# Patient Record
Sex: MALE | Race: WHITE | Employment: UNEMPLOYED | URBAN - METROPOLITAN AREA
[De-identification: names, ages, dates, MRNs, and addresses within clinical notes are randomized per-mention and may not be internally consistent; named-entity substitution may affect disease eponyms.]

---

## 2024-04-26 ENCOUNTER — APPOINTMENT (OUTPATIENT)
Dept: CT IMAGING | Age: 74
DRG: 175 | End: 2024-04-26
Attending: EMERGENCY MEDICINE
Payer: MEDICARE

## 2024-04-26 ENCOUNTER — HOSPITAL ENCOUNTER (INPATIENT)
Age: 74
LOS: 10 days | Discharge: NURSING, REHAB OR LONG-TERM CARE | DRG: 175 | End: 2024-05-06
Attending: EMERGENCY MEDICINE | Admitting: INTERNAL MEDICINE
Payer: MEDICARE

## 2024-04-26 ENCOUNTER — APPOINTMENT (OUTPATIENT)
Dept: GENERAL RADIOLOGY | Age: 74
DRG: 175 | End: 2024-04-26
Payer: MEDICARE

## 2024-04-26 ENCOUNTER — APPOINTMENT (OUTPATIENT)
Age: 74
DRG: 175 | End: 2024-04-26
Attending: EMERGENCY MEDICINE
Payer: MEDICARE

## 2024-04-26 DIAGNOSIS — I26.99 ACUTE PULMONARY EMBOLISM, UNSPECIFIED PULMONARY EMBOLISM TYPE, UNSPECIFIED WHETHER ACUTE COR PULMONALE PRESENT (HCC): Primary | ICD-10-CM

## 2024-04-26 LAB
ALBUMIN SERPL-MCNC: 2.8 G/DL (ref 3.5–5.2)
ALP SERPL-CCNC: 100 U/L (ref 40–129)
ALT SERPL-CCNC: 16 U/L (ref 0–40)
ANION GAP SERPL CALCULATED.3IONS-SCNC: 12 MMOL/L (ref 7–16)
AST SERPL-CCNC: 35 U/L (ref 0–39)
B.E.: 5.9 MMOL/L (ref -3–3)
BASOPHILS # BLD: 0.06 K/UL (ref 0–0.2)
BASOPHILS NFR BLD: 1 % (ref 0–2)
BILIRUB SERPL-MCNC: 0.7 MG/DL (ref 0–1.2)
BILIRUB UR QL STRIP: ABNORMAL
BNP SERPL-MCNC: 1044 PG/ML (ref 0–450)
BUN SERPL-MCNC: 8 MG/DL (ref 6–23)
CALCIUM SERPL-MCNC: 9.7 MG/DL (ref 8.6–10.2)
CHLORIDE SERPL-SCNC: 101 MMOL/L (ref 98–107)
CLARITY UR: CLEAR
CO2 SERPL-SCNC: 28 MMOL/L (ref 22–29)
COHB: 0.7 % (ref 0–1.5)
COLOR UR: YELLOW
COMMENT: ABNORMAL
CREAT SERPL-MCNC: 0.6 MG/DL (ref 0.7–1.2)
CRITICAL: ABNORMAL
DATE ANALYZED: ABNORMAL
DATE OF COLLECTION: ABNORMAL
ECHO BSA: 2.32 M2
ECHO LA VOL A-L A2C: 46 ML (ref 18–58)
ECHO LA VOL A-L A4C: 54 ML (ref 18–58)
ECHO LA VOL BP: 47 ML (ref 18–58)
ECHO LA VOL MOD A2C: 43 ML (ref 18–58)
ECHO LA VOL MOD A4C: 48 ML (ref 18–58)
ECHO LA VOL/BSA BIPLANE: 21 ML/M2 (ref 16–34)
ECHO LA VOLUME AREA LENGTH: 51 ML
ECHO LA VOLUME INDEX A-L A2C: 20 ML/M2 (ref 16–34)
ECHO LA VOLUME INDEX A-L A4C: 24 ML/M2 (ref 16–34)
ECHO LA VOLUME INDEX AREA LENGTH: 23 ML/M2 (ref 16–34)
ECHO LA VOLUME INDEX MOD A2C: 19 ML/M2 (ref 16–34)
ECHO LA VOLUME INDEX MOD A4C: 21 ML/M2 (ref 16–34)
ECHO LV EF PHYSICIAN: 60 %
ECHO LV INTERNAL DIMENSION DIASTOLE INDEX: 1.64 CM/M2
ECHO LV INTERNAL DIMENSION DIASTOLIC: 3.7 CM (ref 4.2–5.9)
ECHO LV IVSD: 1.6 CM (ref 0.6–1)
ECHO LV MASS 2D: 208.7 G (ref 88–224)
ECHO LV MASS INDEX 2D: 92.3 G/M2 (ref 49–115)
ECHO LV POSTERIOR WALL DIASTOLIC: 1.4 CM (ref 0.6–1)
ECHO LV RELATIVE WALL THICKNESS RATIO: 0.76
ECHO MV "A" WAVE DURATION: 133.2 MSEC
ECHO MV A VELOCITY: 0.94 M/S
ECHO MV E DECELERATION TIME (DT): 304.9 MS
ECHO MV E VELOCITY: 0.44 M/S
ECHO MV E/A RATIO: 0.47
ECHO RV INTERNAL DIMENSION: 4.4 CM
ECHO RV TAPSE: 1.9 CM (ref 1.7–?)
ECHO TV REGURGITANT MAX VELOCITY: 2.58 M/S
ECHO TV REGURGITANT PEAK GRADIENT: 24 MMHG
EKG ATRIAL RATE: 81 BPM
EKG P AXIS: 57 DEGREES
EKG P-R INTERVAL: 176 MS
EKG Q-T INTERVAL: 390 MS
EKG QRS DURATION: 148 MS
EKG QTC CALCULATION (BAZETT): 453 MS
EKG R AXIS: 10 DEGREES
EKG T AXIS: 29 DEGREES
EKG VENTRICULAR RATE: 81 BPM
EOSINOPHIL # BLD: 0.09 K/UL (ref 0.05–0.5)
EOSINOPHILS RELATIVE PERCENT: 1 % (ref 0–6)
ERYTHROCYTE [DISTWIDTH] IN BLOOD BY AUTOMATED COUNT: 14.8 % (ref 11.5–15)
GFR SERPL CREATININE-BSD FRML MDRD: >90 ML/MIN/1.73M2
GLUCOSE SERPL-MCNC: 91 MG/DL (ref 74–99)
GLUCOSE UR STRIP-MCNC: NEGATIVE MG/DL
HCO3: 29.3 MMOL/L (ref 22–26)
HCT VFR BLD AUTO: 43.2 % (ref 37–54)
HGB BLD-MCNC: 13.6 G/DL (ref 12.5–16.5)
HGB UR QL STRIP.AUTO: NEGATIVE
HHB: 3.1 % (ref 0–5)
IMM GRANULOCYTES # BLD AUTO: 0.04 K/UL (ref 0–0.58)
IMM GRANULOCYTES NFR BLD: 1 % (ref 0–5)
KETONES UR STRIP-MCNC: >80 MG/DL
LAB: ABNORMAL
LACTATE BLDV-SCNC: 1.3 MMOL/L (ref 0.5–1.9)
LEUKOCYTE ESTERASE UR QL STRIP: NEGATIVE
LYMPHOCYTES NFR BLD: 1.26 K/UL (ref 1.5–4)
LYMPHOCYTES RELATIVE PERCENT: 15 % (ref 20–42)
Lab: 1055
MCH RBC QN AUTO: 27.6 PG (ref 26–35)
MCHC RBC AUTO-ENTMCNC: 31.5 G/DL (ref 32–34.5)
MCV RBC AUTO: 87.6 FL (ref 80–99.9)
METHB: 0.3 % (ref 0–1.5)
MODE: ABNORMAL
MONOCYTES NFR BLD: 1.12 K/UL (ref 0.1–0.95)
MONOCYTES NFR BLD: 14 % (ref 2–12)
NEUTROPHILS NFR BLD: 69 % (ref 43–80)
NEUTS SEG NFR BLD: 5.59 K/UL (ref 1.8–7.3)
NITRITE UR QL STRIP: NEGATIVE
O2 SATURATION: 96.9 % (ref 92–98.5)
O2HB: 95.9 % (ref 94–97)
OPERATOR ID: ABNORMAL
PARTIAL THROMBOPLASTIN TIME: >240 SEC (ref 24.5–35.1)
PATIENT TEMP: 37 C
PCO2: 38.2 MMHG (ref 35–45)
PH BLOOD GAS: 7.5 (ref 7.35–7.45)
PH UR STRIP: 7 [PH] (ref 5–9)
PLATELET # BLD AUTO: 330 K/UL (ref 130–450)
PMV BLD AUTO: 10.3 FL (ref 7–12)
PO2: 91.6 MMHG (ref 75–100)
POTASSIUM SERPL-SCNC: 4.3 MMOL/L (ref 3.5–5)
PROT SERPL-MCNC: 5.8 G/DL (ref 6.4–8.3)
PROT UR STRIP-MCNC: NEGATIVE MG/DL
RBC # BLD AUTO: 4.93 M/UL (ref 3.8–5.8)
SODIUM SERPL-SCNC: 141 MMOL/L (ref 132–146)
SOURCE, BLOOD GAS: ABNORMAL
SP GR UR STRIP: 1.01 (ref 1–1.03)
THB: 14 G/DL (ref 11.5–16.5)
TIME ANALYZED: 1102
TROPONIN I SERPL HS-MCNC: 76 NG/L (ref 0–11)
TROPONIN I SERPL HS-MCNC: 76 NG/L (ref 0–11)
UROBILINOGEN UR STRIP-ACNC: 2 EU/DL (ref 0–1)
WBC OTHER # BLD: 8.2 K/UL (ref 4.5–11.5)

## 2024-04-26 PROCEDURE — 84182 PROTEIN WESTERN BLOT TEST: CPT

## 2024-04-26 PROCEDURE — 93308 TTE F-UP OR LMTD: CPT | Performed by: INTERNAL MEDICINE

## 2024-04-26 PROCEDURE — 6360000004 HC RX CONTRAST MEDICATION: Performed by: RADIOLOGY

## 2024-04-26 PROCEDURE — 2580000003 HC RX 258: Performed by: EMERGENCY MEDICINE

## 2024-04-26 PROCEDURE — 2000000000 HC ICU R&B

## 2024-04-26 PROCEDURE — 84484 ASSAY OF TROPONIN QUANT: CPT

## 2024-04-26 PROCEDURE — 83880 ASSAY OF NATRIURETIC PEPTIDE: CPT

## 2024-04-26 PROCEDURE — 83605 ASSAY OF LACTIC ACID: CPT

## 2024-04-26 PROCEDURE — 6360000002 HC RX W HCPCS: Performed by: EMERGENCY MEDICINE

## 2024-04-26 PROCEDURE — 71275 CT ANGIOGRAPHY CHEST: CPT

## 2024-04-26 PROCEDURE — 74177 CT ABD & PELVIS W/CONTRAST: CPT

## 2024-04-26 PROCEDURE — 87040 BLOOD CULTURE FOR BACTERIA: CPT

## 2024-04-26 PROCEDURE — 71045 X-RAY EXAM CHEST 1 VIEW: CPT

## 2024-04-26 PROCEDURE — 99285 EMERGENCY DEPT VISIT HI MDM: CPT

## 2024-04-26 PROCEDURE — 85025 COMPLETE CBC W/AUTO DIFF WBC: CPT

## 2024-04-26 PROCEDURE — 93005 ELECTROCARDIOGRAM TRACING: CPT | Performed by: EMERGENCY MEDICINE

## 2024-04-26 PROCEDURE — 96374 THER/PROPH/DIAG INJ IV PUSH: CPT

## 2024-04-26 PROCEDURE — 2580000003 HC RX 258

## 2024-04-26 PROCEDURE — 82805 BLOOD GASES W/O2 SATURATION: CPT

## 2024-04-26 PROCEDURE — 93010 ELECTROCARDIOGRAM REPORT: CPT | Performed by: INTERNAL MEDICINE

## 2024-04-26 PROCEDURE — 6370000000 HC RX 637 (ALT 250 FOR IP)

## 2024-04-26 PROCEDURE — 80053 COMPREHEN METABOLIC PANEL: CPT

## 2024-04-26 PROCEDURE — 93308 TTE F-UP OR LMTD: CPT

## 2024-04-26 PROCEDURE — 99291 CRITICAL CARE FIRST HOUR: CPT | Performed by: INTERNAL MEDICINE

## 2024-04-26 PROCEDURE — 81003 URINALYSIS AUTO W/O SCOPE: CPT

## 2024-04-26 PROCEDURE — 86051 AQUAPORIN-4 ANTB ELISA: CPT

## 2024-04-26 PROCEDURE — 70450 CT HEAD/BRAIN W/O DYE: CPT

## 2024-04-26 PROCEDURE — 85730 THROMBOPLASTIN TIME PARTIAL: CPT

## 2024-04-26 RX ORDER — INSULIN GLARGINE 100 [IU]/ML
30 INJECTION, SOLUTION SUBCUTANEOUS EVERY MORNING
Status: ON HOLD | COMMUNITY
End: 2024-05-06 | Stop reason: HOSPADM

## 2024-04-26 RX ORDER — POLYETHYLENE GLYCOL 3350 17 G/17G
17 POWDER, FOR SOLUTION ORAL DAILY PRN
Status: DISCONTINUED | OUTPATIENT
Start: 2024-04-26 | End: 2024-05-06 | Stop reason: HOSPADM

## 2024-04-26 RX ORDER — TAMSULOSIN HYDROCHLORIDE 0.4 MG/1
0.4 CAPSULE ORAL DAILY
Status: DISCONTINUED | OUTPATIENT
Start: 2024-04-26 | End: 2024-05-06 | Stop reason: HOSPADM

## 2024-04-26 RX ORDER — 0.9 % SODIUM CHLORIDE 0.9 %
500 INTRAVENOUS SOLUTION INTRAVENOUS ONCE
Status: COMPLETED | OUTPATIENT
Start: 2024-04-26 | End: 2024-04-26

## 2024-04-26 RX ORDER — SODIUM CHLORIDE 0.9 % (FLUSH) 0.9 %
5-40 SYRINGE (ML) INJECTION EVERY 12 HOURS SCHEDULED
Status: DISCONTINUED | OUTPATIENT
Start: 2024-04-26 | End: 2024-05-06 | Stop reason: HOSPADM

## 2024-04-26 RX ORDER — ACETAMINOPHEN 325 MG/1
650 TABLET ORAL EVERY 6 HOURS PRN
Status: DISCONTINUED | OUTPATIENT
Start: 2024-04-26 | End: 2024-05-06 | Stop reason: HOSPADM

## 2024-04-26 RX ORDER — HEPARIN SODIUM 10000 [USP'U]/100ML
5-30 INJECTION, SOLUTION INTRAVENOUS CONTINUOUS
Status: DISCONTINUED | OUTPATIENT
Start: 2024-04-26 | End: 2024-04-30

## 2024-04-26 RX ORDER — ACETAMINOPHEN 650 MG/1
650 SUPPOSITORY RECTAL EVERY 6 HOURS PRN
Status: DISCONTINUED | OUTPATIENT
Start: 2024-04-26 | End: 2024-05-06 | Stop reason: HOSPADM

## 2024-04-26 RX ORDER — TAMSULOSIN HYDROCHLORIDE 0.4 MG/1
0.4 CAPSULE ORAL DAILY
Status: ON HOLD | COMMUNITY
End: 2024-05-06 | Stop reason: HOSPADM

## 2024-04-26 RX ORDER — QUETIAPINE FUMARATE 25 MG/1
50 TABLET, FILM COATED ORAL NIGHTLY
Status: ON HOLD | COMMUNITY
End: 2024-05-06 | Stop reason: HOSPADM

## 2024-04-26 RX ORDER — ONDANSETRON 4 MG/1
4 TABLET, FILM COATED ORAL EVERY 8 HOURS PRN
COMMUNITY

## 2024-04-26 RX ORDER — SODIUM CHLORIDE 0.9 % (FLUSH) 0.9 %
5-40 SYRINGE (ML) INJECTION PRN
Status: DISCONTINUED | OUTPATIENT
Start: 2024-04-26 | End: 2024-05-06 | Stop reason: HOSPADM

## 2024-04-26 RX ORDER — ONDANSETRON 2 MG/ML
4 INJECTION INTRAMUSCULAR; INTRAVENOUS EVERY 6 HOURS PRN
Status: DISCONTINUED | OUTPATIENT
Start: 2024-04-26 | End: 2024-05-06 | Stop reason: HOSPADM

## 2024-04-26 RX ORDER — HEPARIN SODIUM 1000 [USP'U]/ML
80 INJECTION, SOLUTION INTRAVENOUS; SUBCUTANEOUS PRN
Status: DISCONTINUED | OUTPATIENT
Start: 2024-04-26 | End: 2024-04-30

## 2024-04-26 RX ORDER — FENTANYL 25 UG/1
1 PATCH TRANSDERMAL
Status: DISCONTINUED | OUTPATIENT
Start: 2024-04-26 | End: 2024-04-30

## 2024-04-26 RX ORDER — 0.9 % SODIUM CHLORIDE 0.9 %
1000 INTRAVENOUS SOLUTION INTRAVENOUS ONCE
Status: COMPLETED | OUTPATIENT
Start: 2024-04-26 | End: 2024-04-26

## 2024-04-26 RX ORDER — SODIUM CHLORIDE 9 MG/ML
INJECTION, SOLUTION INTRAVENOUS PRN
Status: DISCONTINUED | OUTPATIENT
Start: 2024-04-26 | End: 2024-05-06 | Stop reason: HOSPADM

## 2024-04-26 RX ORDER — QUETIAPINE FUMARATE 25 MG/1
50 TABLET, FILM COATED ORAL NIGHTLY
Status: DISCONTINUED | OUTPATIENT
Start: 2024-04-26 | End: 2024-05-03

## 2024-04-26 RX ORDER — ONDANSETRON 4 MG/1
4 TABLET, ORALLY DISINTEGRATING ORAL EVERY 8 HOURS PRN
Status: DISCONTINUED | OUTPATIENT
Start: 2024-04-26 | End: 2024-05-06 | Stop reason: HOSPADM

## 2024-04-26 RX ORDER — HEPARIN SODIUM 1000 [USP'U]/ML
80 INJECTION, SOLUTION INTRAVENOUS; SUBCUTANEOUS ONCE
Status: COMPLETED | OUTPATIENT
Start: 2024-04-26 | End: 2024-04-26

## 2024-04-26 RX ORDER — HEPARIN SODIUM 1000 [USP'U]/ML
40 INJECTION, SOLUTION INTRAVENOUS; SUBCUTANEOUS PRN
Status: DISCONTINUED | OUTPATIENT
Start: 2024-04-26 | End: 2024-04-30

## 2024-04-26 RX ADMIN — SODIUM CHLORIDE, PRESERVATIVE FREE 10 ML: 5 INJECTION INTRAVENOUS at 22:14

## 2024-04-26 RX ADMIN — HEPARIN SODIUM 18 UNITS/KG/HR: 10000 INJECTION, SOLUTION INTRAVENOUS at 15:47

## 2024-04-26 RX ADMIN — HEPARIN SODIUM 8700 UNITS: 1000 INJECTION INTRAVENOUS; SUBCUTANEOUS at 15:44

## 2024-04-26 RX ADMIN — QUETIAPINE FUMARATE 50 MG: 25 TABLET ORAL at 22:14

## 2024-04-26 RX ADMIN — SODIUM CHLORIDE 1000 ML: 9 INJECTION, SOLUTION INTRAVENOUS at 11:00

## 2024-04-26 RX ADMIN — SODIUM CHLORIDE 500 ML: 9 INJECTION, SOLUTION INTRAVENOUS at 15:41

## 2024-04-26 RX ADMIN — CEFEPIME 2000 MG: 2 INJECTION, POWDER, FOR SOLUTION INTRAVENOUS at 17:35

## 2024-04-26 RX ADMIN — IOPAMIDOL 75 ML: 755 INJECTION, SOLUTION INTRAVENOUS at 15:17

## 2024-04-26 ASSESSMENT — PAIN SCALES - GENERAL: PAINLEVEL_OUTOF10: 0

## 2024-04-26 ASSESSMENT — LIFESTYLE VARIABLES: HOW MANY STANDARD DRINKS CONTAINING ALCOHOL DO YOU HAVE ON A TYPICAL DAY: PATIENT DOES NOT DRINK

## 2024-04-26 ASSESSMENT — PAIN - FUNCTIONAL ASSESSMENT: PAIN_FUNCTIONAL_ASSESSMENT: NONE - DENIES PAIN

## 2024-04-26 NOTE — H&P
[facility]  Internal Medicine Residency Program  History and Physical    Patient:  Luis Seo 74 y.o. male   MRN: [id number]       Date of Service: 4/26/2024    Chief complaint: had concerns including Fatigue (Bilateral leg weakness, fatigue, Pancreatic CA had lrg portion removed 3 weeks ago. Denies pain, 87% on RA denies SOB).  History of Present Illness   The patient is a 74 y.o. male presenting with complaint of worsening BLE weakness and fatigue. Recent hx of pancreatic Ca, RCC s/p resection with known persisting positive margins and microwave ablation. Is largely immobile at home where his daughter is his caretaker. Had been on hospice care after additional workup at [facility] unremarkable investigating his recent neurological sxs.     ED Course: CTA w b/l PE, R>L with significant clot burden. Pt started on heparin ggt. STAT ECHO without R heart strain. Pt was given x1 dose of cefepime. Hypertensive systolic max 159, no tachycardia, saturating well on RA initially now wearing NC for comfort 1L 95%.     Review of Systems   Constitutional:  Positive for fatigue. Negative for chills and fever.   HENT:  Positive for trouble swallowing. Negative for congestion.    Eyes:  Negative for visual disturbance.   Respiratory:  Negative for cough, chest tightness and wheezing.    Cardiovascular:  Positive for leg swelling (L>R). Negative for chest pain and palpitations.   Gastrointestinal:  Positive for diarrhea (Intermitent.). Negative for abdominal pain, blood in stool, constipation, nausea and vomiting.   Genitourinary:  Negative for difficulty urinating, dysuria and hematuria.   Neurological:  Positive for weakness (BLE). Negative for dizziness, syncope and headaches.     Medications Prior to Admission:    Prior to Admission medications    Medication Sig Start Date End Date Taking? Authorizing Provider   tamsulosin (FLOMAX) 0.4 MG capsule Take 1 capsule by mouth daily   Yes Provider, MD Daryl

## 2024-04-26 NOTE — ED NOTES
Report given to Eusebia RUBALCAVA from Elvia RN   Report method by phone   The following was reviewed with receiving RN:   Current vital signs:  BP (!) 141/79   Pulse 74   Temp 98.1 °F (36.7 °C) (Oral)   Resp 24   Ht 1.778 m (5' 10\")   Wt 108.9 kg (240 lb)   SpO2 96%   BMI 34.44 kg/m²                MEWS Score: 1     Any medication or safety alerts were reviewed. Any pending diagnostics and notifications were also reviewed, as well as any safety concerns or issues, abnormal labs, abnormal imaging, and abnormal assessment findings. Questions were answered.

## 2024-04-26 NOTE — ED NOTES
The following labs labeled with pt sticker and tubed to lab:     [x] Blue     [] Lavender   [] on ice  [x] Green/yellow  [] Green/black [] on ice  [] Yellow  [] Red  [] Pink      [] COVID-19 swab    [] Rapid  [] PCR  [] Peds Viral Panel     [] Urine Sample  [] Pelvic Cultures  [] Blood Cultures

## 2024-04-26 NOTE — ED PROVIDER NOTES
[facility] EMERGENCY DEPARTMENT  EMERGENCY DEPARTMENT ENCOUNTER        Pt Name: Luis Seo  MRN: [id number]  Birthdate 1950  Date of evaluation: 4/26/2024  Provider: Jorge Curiel MD  PCP: Brad Bassett MD  Note Started: 10:08 AM EDT 4/26/24    CHIEF COMPLAINT       Chief Complaint   Patient presents with    Fatigue     Bilateral leg weakness, fatigue, Pancreatic CA had lrg portion removed 3 weeks ago. Denies pain, 87% on RA denies SOB       HISTORY OF PRESENT ILLNESS: 1 or more Elements        Limitations to history : None    Luis Seo is a 74 y.o. male who presents for generalized weakness and fatigue.  Patient reports a complex history including a recent [place] as well as full complex workup at the [facility] and eventually being discharged home with generalized weakness in his legs.  He says he feels like he is getting more weak and fatigued over the last several weeks.  He says he has no energy.  No fever or chills.  No chest pain or shortness of breath.  He was hypoxic on EMS arrival.  He reports 2 bowel movements recently.  He denies any blood in the stool.  He denies any bleeding.  He denies any paralysis or unilateral weakness.  Denies any speech changes.  Denies facial droop.    Nursing Notes were all reviewed and agreed with or any disagreements were addressed in the HPI.      REVIEW OF EXTERNAL NOTE :       Oncology notes from yesterday  4-10-24 neurology notes      Chart Review/External Note Review    Last Echo reviewed by Me:  No results found for: \"LVEF\", \"LVEFMODE\"          Controlled Substance Monitoring:    Acute and Chronic Pain Monitoring:        No data to display                    REVIEW OF SYSTEMS :      Positives and Pertinent negatives as per HPI.     SURGICAL HISTORY   No past surgical history on file.    CURRENTMEDICATIONS       Previous Medications    FENTANYL (DURAGESIC) 25 MCG/HR    Place 1 patch onto the skin    CARBON DIOXIDE 28   Anion Gap 12   Glucose, Random 91   BUN,BUNPL 8   Creatinine 0.6(!)   Est, Glom Filt Rate >90   Calcium, Total 9.7   Total Protein, Serum 5.8(!)   Albumin 2.8(!)   Total Bilirubin 0.7   Alk Phos 100   ALT 16   AST 35 [CD]   1614 Blood Gas, Arterial(!):    Date Analyzed 20240426   Time Analyzed 1102   Source: Blood Arterial   pH, Blood Gas 7.502(!)   PCO2 38.2   pO2 91.6   HCO3 29.3(!)   Base Excess 5.9(!)   O2 Sat 96.9   O2Hb 95.9   Carboxy-Hgb 0.7   METHB,METHB 0.3   HHb 3.1   THB,THB 14.0   Mode NC-2  L   Date Of Collection 20240426   Time Collected 1055   Pt Temp 37.0    ID [id number]  01     Lab [id number]   Critical(s) Notified . No Critical Values [CD]   1614 Culture, Blood 2:    Specimen Description .BLOOD   Special Requests        Culture NO GROWTH <24 HRS [CD]   1614 Blood Culture 1:    Specimen Description .BLOOD   Special Requests        Culture NO GROWTH <24 HRS [CD]   1614 Troponin Now and Q 1 Hour(!):    Troponin, High Sensitivity 76(!) [CD]   1614 Lactate, Sepsis:    Lactic Acid, Sepsis 1.3 [CD]   1614 CBC with Auto Differential(!):    WBC 8.2   RBC 4.93   Hemoglobin Quant 13.6   Hematocrit 43.2   MCV 87.6   MCH 27.6   MCHC 31.5(!)   RDW 14.8   Platelet Count 330   MPV 10.3   Neutrophils/100 leukocytes 69   Lymphocyte % 15(!)   Monocytes % 14(!)   Eosinophils % 1   Basophils % 1   Immature Granulocytes % 1   Neutrophils Absolute 5.59   Lymphocytes Absolute 1.26(!)   Monocytes Absolute 1.12(!)   Eosinophils Absolute 0.09   Basophils Absolute 0.06   Immature Granulocytes Absolute 0.04 [CD]   1614 The following tests were interpreted by me: CXR - no pneumothorax     [CD]      ED Course User Index  [CD] Jorge Curiel MD       Medical Decision Making   Differential Diagnosis includes but not limited to: Pulmonary embolism, pneumonia, pneumothorax, anemia, renal failure.  Fortunately his chest x-ray does not show any obvious pneumothorax.  His laboratory testing has a troponin of 76,

## 2024-04-27 LAB
ABO + RH BLD: NORMAL
ANION GAP SERPL CALCULATED.3IONS-SCNC: 8 MMOL/L (ref 7–16)
ANION GAP SERPL CALCULATED.3IONS-SCNC: 8 MMOL/L (ref 7–16)
ARM BAND NUMBER: NORMAL
BASOPHILS # BLD: 0.09 K/UL (ref 0–0.2)
BASOPHILS NFR BLD: 1 % (ref 0–2)
BLOOD BANK SAMPLE EXPIRATION: NORMAL
BLOOD GROUP ANTIBODIES SERPL: NEGATIVE
BUN SERPL-MCNC: 14 MG/DL (ref 6–23)
BUN SERPL-MCNC: 17 MG/DL (ref 6–23)
CALCIUM SERPL-MCNC: 9.7 MG/DL (ref 8.6–10.2)
CALCIUM SERPL-MCNC: 9.7 MG/DL (ref 8.6–10.2)
CHLORIDE SERPL-SCNC: 105 MMOL/L (ref 98–107)
CHLORIDE SERPL-SCNC: 99 MMOL/L (ref 98–107)
CO2 SERPL-SCNC: 31 MMOL/L (ref 22–29)
CO2 SERPL-SCNC: 31 MMOL/L (ref 22–29)
CREAT SERPL-MCNC: 0.5 MG/DL (ref 0.7–1.2)
CREAT SERPL-MCNC: 0.6 MG/DL (ref 0.7–1.2)
EOSINOPHIL # BLD: 0.16 K/UL (ref 0.05–0.5)
EOSINOPHILS RELATIVE PERCENT: 2 % (ref 0–6)
ERYTHROCYTE [DISTWIDTH] IN BLOOD BY AUTOMATED COUNT: 14.6 % (ref 11.5–15)
GFR SERPL CREATININE-BSD FRML MDRD: >90 ML/MIN/1.73M2
GFR SERPL CREATININE-BSD FRML MDRD: >90 ML/MIN/1.73M2
GLUCOSE BLD-MCNC: 113 MG/DL (ref 74–99)
GLUCOSE BLD-MCNC: 137 MG/DL (ref 74–99)
GLUCOSE BLD-MCNC: 96 MG/DL (ref 74–99)
GLUCOSE SERPL-MCNC: 160 MG/DL (ref 74–99)
GLUCOSE SERPL-MCNC: 99 MG/DL (ref 74–99)
HCT VFR BLD AUTO: 35.5 % (ref 37–54)
HCT VFR BLD AUTO: 36.8 % (ref 37–54)
HGB BLD-MCNC: 11.2 G/DL (ref 12.5–16.5)
HGB BLD-MCNC: 11.6 G/DL (ref 12.5–16.5)
IMM GRANULOCYTES # BLD AUTO: 0.05 K/UL (ref 0–0.58)
IMM GRANULOCYTES NFR BLD: 1 % (ref 0–5)
INR PPP: 1.5
LYMPHOCYTES NFR BLD: 1.57 K/UL (ref 1.5–4)
LYMPHOCYTES RELATIVE PERCENT: 18 % (ref 20–42)
MAGNESIUM SERPL-MCNC: 1.6 MG/DL (ref 1.6–2.6)
MAGNESIUM SERPL-MCNC: 2.1 MG/DL (ref 1.6–2.6)
MCH RBC QN AUTO: 27.5 PG (ref 26–35)
MCHC RBC AUTO-ENTMCNC: 31.5 G/DL (ref 32–34.5)
MCV RBC AUTO: 87.2 FL (ref 80–99.9)
MONOCYTES NFR BLD: 1.12 K/UL (ref 0.1–0.95)
MONOCYTES NFR BLD: 13 % (ref 2–12)
NEUTROPHILS NFR BLD: 65 % (ref 43–80)
NEUTS SEG NFR BLD: 5.59 K/UL (ref 1.8–7.3)
PARTIAL THROMBOPLASTIN TIME: 133.2 SEC (ref 24.5–35.1)
PARTIAL THROMBOPLASTIN TIME: 201.2 SEC (ref 24.5–35.1)
PARTIAL THROMBOPLASTIN TIME: 66.4 SEC (ref 24.5–35.1)
PHOSPHATE SERPL-MCNC: 2.1 MG/DL (ref 2.5–4.5)
PLATELET # BLD AUTO: 293 K/UL (ref 130–450)
PMV BLD AUTO: 10.7 FL (ref 7–12)
POTASSIUM SERPL-SCNC: 3.2 MMOL/L (ref 3.5–5)
POTASSIUM SERPL-SCNC: 3.4 MMOL/L (ref 3.5–5)
PROTHROMBIN TIME: 16.3 SEC (ref 9.3–12.4)
RBC # BLD AUTO: 4.07 M/UL (ref 3.8–5.8)
SODIUM SERPL-SCNC: 138 MMOL/L (ref 132–146)
SODIUM SERPL-SCNC: 144 MMOL/L (ref 132–146)
WBC OTHER # BLD: 8.6 K/UL (ref 4.5–11.5)

## 2024-04-27 PROCEDURE — 85018 HEMOGLOBIN: CPT

## 2024-04-27 PROCEDURE — 85025 COMPLETE CBC W/AUTO DIFF WBC: CPT

## 2024-04-27 PROCEDURE — 6360000002 HC RX W HCPCS

## 2024-04-27 PROCEDURE — 99222 1ST HOSP IP/OBS MODERATE 55: CPT | Performed by: INTERNAL MEDICINE

## 2024-04-27 PROCEDURE — 99497 ADVNCD CARE PLAN 30 MIN: CPT | Performed by: NURSE PRACTITIONER

## 2024-04-27 PROCEDURE — 84100 ASSAY OF PHOSPHORUS: CPT

## 2024-04-27 PROCEDURE — 99223 1ST HOSP IP/OBS HIGH 75: CPT | Performed by: NURSE PRACTITIONER

## 2024-04-27 PROCEDURE — 80048 BASIC METABOLIC PNL TOTAL CA: CPT

## 2024-04-27 PROCEDURE — 86901 BLOOD TYPING SEROLOGIC RH(D): CPT

## 2024-04-27 PROCEDURE — 6370000000 HC RX 637 (ALT 250 FOR IP)

## 2024-04-27 PROCEDURE — 85014 HEMATOCRIT: CPT

## 2024-04-27 PROCEDURE — 82962 GLUCOSE BLOOD TEST: CPT

## 2024-04-27 PROCEDURE — 6360000002 HC RX W HCPCS: Performed by: EMERGENCY MEDICINE

## 2024-04-27 PROCEDURE — 36415 COLL VENOUS BLD VENIPUNCTURE: CPT

## 2024-04-27 PROCEDURE — 2700000000 HC OXYGEN THERAPY PER DAY

## 2024-04-27 PROCEDURE — 85730 THROMBOPLASTIN TIME PARTIAL: CPT

## 2024-04-27 PROCEDURE — 99233 SBSQ HOSP IP/OBS HIGH 50: CPT | Performed by: INTERNAL MEDICINE

## 2024-04-27 PROCEDURE — 2140000000 HC CCU INTERMEDIATE R&B

## 2024-04-27 PROCEDURE — 85610 PROTHROMBIN TIME: CPT

## 2024-04-27 PROCEDURE — 83735 ASSAY OF MAGNESIUM: CPT

## 2024-04-27 PROCEDURE — 86900 BLOOD TYPING SEROLOGIC ABO: CPT

## 2024-04-27 PROCEDURE — 86850 RBC ANTIBODY SCREEN: CPT

## 2024-04-27 PROCEDURE — 2580000003 HC RX 258

## 2024-04-27 RX ORDER — DEXTROSE MONOHYDRATE 100 MG/ML
INJECTION, SOLUTION INTRAVENOUS CONTINUOUS PRN
Status: DISCONTINUED | OUTPATIENT
Start: 2024-04-27 | End: 2024-05-06 | Stop reason: HOSPADM

## 2024-04-27 RX ORDER — POTASSIUM CHLORIDE 20 MEQ/1
40 TABLET, EXTENDED RELEASE ORAL ONCE
Status: DISCONTINUED | OUTPATIENT
Start: 2024-04-27 | End: 2024-04-27

## 2024-04-27 RX ORDER — GLUCAGON 1 MG/ML
1 KIT INJECTION PRN
Status: DISCONTINUED | OUTPATIENT
Start: 2024-04-27 | End: 2024-05-06 | Stop reason: HOSPADM

## 2024-04-27 RX ORDER — MAGNESIUM SULFATE IN WATER 40 MG/ML
2000 INJECTION, SOLUTION INTRAVENOUS ONCE
Status: COMPLETED | OUTPATIENT
Start: 2024-04-27 | End: 2024-04-27

## 2024-04-27 RX ORDER — WARFARIN SODIUM 2.5 MG/1
2.5 TABLET ORAL
Status: COMPLETED | OUTPATIENT
Start: 2024-04-27 | End: 2024-04-27

## 2024-04-27 RX ORDER — POTASSIUM CHLORIDE 7.45 MG/ML
10 INJECTION INTRAVENOUS
Status: DISCONTINUED | OUTPATIENT
Start: 2024-04-27 | End: 2024-04-27

## 2024-04-27 RX ORDER — INSULIN LISPRO 100 [IU]/ML
0-4 INJECTION, SOLUTION INTRAVENOUS; SUBCUTANEOUS
Status: DISCONTINUED | OUTPATIENT
Start: 2024-04-27 | End: 2024-05-01

## 2024-04-27 RX ORDER — PANTOPRAZOLE SODIUM 40 MG/1
40 TABLET, DELAYED RELEASE ORAL
Status: DISCONTINUED | OUTPATIENT
Start: 2024-04-27 | End: 2024-05-06 | Stop reason: HOSPADM

## 2024-04-27 RX ORDER — INSULIN LISPRO 100 [IU]/ML
0-4 INJECTION, SOLUTION INTRAVENOUS; SUBCUTANEOUS NIGHTLY
Status: DISCONTINUED | OUTPATIENT
Start: 2024-04-27 | End: 2024-05-01

## 2024-04-27 RX ADMIN — TAMSULOSIN HYDROCHLORIDE 0.4 MG: 0.4 CAPSULE ORAL at 08:10

## 2024-04-27 RX ADMIN — SODIUM CHLORIDE, PRESERVATIVE FREE 10 ML: 5 INJECTION INTRAVENOUS at 20:39

## 2024-04-27 RX ADMIN — QUETIAPINE FUMARATE 50 MG: 25 TABLET ORAL at 20:37

## 2024-04-27 RX ADMIN — MAGNESIUM SULFATE HEPTAHYDRATE 2000 MG: 40 INJECTION, SOLUTION INTRAVENOUS at 08:15

## 2024-04-27 RX ADMIN — HEPARIN SODIUM 9 UNITS/KG/HR: 10000 INJECTION, SOLUTION INTRAVENOUS at 23:55

## 2024-04-27 RX ADMIN — POTASSIUM BICARBONATE 40 MEQ: 782 TABLET, EFFERVESCENT ORAL at 08:10

## 2024-04-27 RX ADMIN — PANTOPRAZOLE SODIUM 40 MG: 40 TABLET, DELAYED RELEASE ORAL at 05:57

## 2024-04-27 RX ADMIN — HEPARIN SODIUM 15 UNITS/KG/HR: 10000 INJECTION, SOLUTION INTRAVENOUS at 06:35

## 2024-04-27 RX ADMIN — SODIUM CHLORIDE, PRESERVATIVE FREE 10 ML: 5 INJECTION INTRAVENOUS at 08:10

## 2024-04-27 RX ADMIN — POTASSIUM BICARBONATE 40 MEQ: 782 TABLET, EFFERVESCENT ORAL at 21:00

## 2024-04-27 RX ADMIN — WARFARIN SODIUM 2.5 MG: 2.5 TABLET ORAL at 18:04

## 2024-04-27 ASSESSMENT — PAIN SCALES - GENERAL
PAINLEVEL_OUTOF10: 0

## 2024-04-27 NOTE — CONSULTS
Cardiology asked to read STAT TTE. TTE read no need for full consult.  Electronically signed by FERCHO ESTRADA on 4/27/2024 at 6:23 AM

## 2024-04-27 NOTE — PROGRESS NOTES
pulses intact, sensation intact,   MSK:  strength 4/5 in bilateral upper extremities; lower extremities 3-4/5 stength; equal sensation     Lines:   Peripheral     Urinary catheter: external catheter on      Labs     Basic Labs    Complete Blood Count:   Recent Labs     04/26/24  1015 04/27/24  0545   WBC 8.2 8.6   HGB 13.6 11.2*   HCT 43.2 35.5*    293        Last 3 Blood Glucose:   Recent Labs     04/26/24  1134 04/27/24  0545   GLUCOSE 91 99        PT/INR:    Lab Results   Component Value Date/Time    PROTIME 13.5 11/24/2010 11:00 AM    INR 1.2 11/24/2010 11:00 AM     PTT:    Lab Results   Component Value Date/Time    APTT 201.2 04/27/2024 05:45 AM       Comprehensive Metabolic Profile:   Recent Labs     04/26/24  1134 04/27/24  0545    144   K 4.3 3.2*    105   CO2 28 31*   BUN 8 17   CREATININE 0.6* 0.5*   GLUCOSE 91 99   CALCIUM 9.7 9.7   PROT 5.8*  --    BILITOT 0.7  --    ALKPHOS 100  --    AST 35  --    ALT 16  --       Magnesium:   Lab Results   Component Value Date/Time    MG 1.6 04/27/2024 05:45 AM     Phosphorus:   Lab Results   Component Value Date/Time    PHOS 2.1 04/27/2024 05:45 AM     Ionized Calcium: No results found for: \"CAION\"   Troponin: No results for input(s): \"TROPONINI\" in the last 72 hours.    Imaging Studies:  Have been reviewed appropriately        Resident's Assessment and Plan      Consults:   C       Assessment:   Intermediate-low risk bilateral massive PE   R>L w high clot burden, no R strain on Echo, elevated biomarkers  On heparin drip   Elevated troponin   Elevated BNP   LLL consolidation, atelectasis vs pneumonia   BLE weakness, fatigue   BUE weakness, improving  Pancreatic adenocarcinoma s/p body and tail partial resection 3/8/24  Clear Cell RCC s/p microwave ablation, on the left    HTN   PAF, previously   on Xarelto OP (discontinued on 3/18/24)  BPH, on flomax OP  Hx Ulcer s/p clips   Dysphagia       Plan:   Continue on Heparin drip and monitor aPTT   Will  start coumadin and bridge per Pharmacy when appropriate and target INR are reached   Continue oxygen and wean as tolerated   PT/OT to come and evaluation patient   Electrolytes replaced and repeat BMP this afternoon   Continue to monitor for signs of bleeding   Stable for transfer out of MICU           PT/OT evaluation: not indicated at this time   DVT prophylaxis: Heparin drip   GI prophylaxis: Protonix   Diet:   ADULT DIET; Clear Liquid   Bowel regimen: Prn   Pain management: as needed  Code status: Limited   Disposition: continue current care/ stable for transfer to the medical floors    Family: updated as available    Anais Kennedy MD, PGY-2   Attending physician: Dr. Mishra      [facility]  Department of Pulmonary, Critical Care and Sleep Medicine  [facility]  Department of Internal Medicine    All the above diagnosis were discussed on ICU team rounds. I saw Luis alvares 74 y.o. male on multidisciplinary team rounds. The patient was seen, examined and discussed at the bedside. The signing of this noted refects my changes and acceptance of the changes made by the clinical pharmacist and resident(s).  Patient will remain in the ICU due to life treating illness and need for continue support and monitoring.     Luis alvares 74 y.o. male illness that is causing systemic symptoms and have a high risk of morbidity without treatment.   Ventilator, oxygen and NIV pressure adjustments were made based on ABGs and CXR dicussed and interpretated  We are using parental controlled substances to aid in sedation and pain control. We will continue to  monitoring for respiratory depression.   Decision was made to order specific drug levels such as antibiotic levels and PCR infection antibody testing.   My interpretation of the current and previous chest radiogram/Imaging reveals no changes compared to yesterday.  There is improvement, worsening noted   I

## 2024-04-27 NOTE — PROGRESS NOTES
[facility]  Internal Medicine Residency Program  Progress Note - House Team       Patient:  Luis Seo 74 y.o. male   MRN: [id number]       Date of Service: 4/27/2024    CC: Fatigue and Weakness   Overnight events: No acute events overnight      Subjective     Patient seen and examined at bedside this a.m.  States that he is not currently having shortness of breath.  Weakness has improved but is not currently at baseline. Denies F/C/N/V/CP/Abd pan       Objective       Physical Exam  Vitals: BP (!) 144/82   Pulse 85   Temp 98.6 °F (37 °C) (Temporal)   Resp 15   Ht 1.778 m (5' 10\")   Wt 84.1 kg (185 lb 6.5 oz)   SpO2 90%   BMI 26.60 kg/m²     I & O - 24hr: No intake/output data recorded.   General Appearance: alert, appears stated age, and cooperative.  On 2 L NC O2  HEENT:  Head: Normal, normocephalic, atraumatic.  Neck: no carotid bruit and supple, symmetrical, trachea midline  Lung: clear to auscultation bilaterally  Heart: regular rate and rhythm, S1, S2 normal, no murmur, click, rub or gallop  Abdomen: soft, non-tender; bowel sounds normal; no masses,  no organomegaly  Extremities:  extremities normal, atraumatic, no cyanosis or edema  Musculokeletal: 4/5 Strength in BUE, 3/5 Strength in RLE, 4/5 Strength in LLE. Sensation intact. No joint swelling, no muscle tenderness.   Neurologic: Mental status: Alert, oriented, thought content appropriate    Diet:   ADULT DIET; Clear Liquid      Pertinent Labs & Imaging Studies     Labs    CBC with Differential:    Lab Results   Component Value Date/Time    WBC 8.6 04/27/2024 05:45 AM    RBC 4.07 04/27/2024 05:45 AM    HGB 11.6 04/27/2024 06:01 PM    HCT 36.8 04/27/2024 06:01 PM     04/27/2024 05:45 AM    MCV 87.2 04/27/2024 05:45 AM    MCH 27.5 04/27/2024 05:45 AM    MCHC 31.5 04/27/2024 05:45 AM    RDW 14.6 04/27/2024 05:45 AM    LYMPHOPCT 18 04/27/2024 05:45 AM    MONOPCT 13 04/27/2024 05:45 AM    BASOPCT 1 04/27/2024 05:45 AM

## 2024-04-27 NOTE — CONSULTS
[facility]  Internal Medicine Residency Program  MICU Consult    Patient:  Luis Seo 74 y.o. male MRN: [id number]     Date of Service: 4/26/2024    Hospital Day: 1      Chief complaint: fatigue, weakness    History Obtained From:  patient, family member - daughters, electronic medical record    History of Present Illness   Luis Seo is a 74 y.o. male with past medical history of pancreatic adenocarcinoma and renal cell carcinoma with recent pancreatectomy and microwave ablation at [facility] who presents with concern of fatigue.  He had pancreatectomy and ablation done mid March and upon returning home 2 days later had profound weakness in bilateral upper and lower extremities.  His lower extremities eventually began having hyperesthesia.  He regained function of his upper extremities after about a month.  His bilateral lower extremities continue to be weak.  He also had diplopia at that time.  Extensive workup at [facility] revealed inflammation on MRI with extensive enhancement of the leptomeninges but serologic workup and lumbar puncture were unrevealing for cause.  He was started on steroids at 1 point.  Also consideration for GBS and IgG at that time however patient decided not to start the treatment due to concerns for renal failure.    His workup in the emergency department consisted of a CTA which revealed bilateral pulmonary embolism with significant clot burden and concern for right ventricular strain on CTA.  Stat bedside echo revealed no evidence of right ventricular strain and patient was started on heparin drip and transferred to the ICU for closer monitoring.    Patient denies fever, chills, chest pain, shortness of breath, abdominal pain.  He remains weak in bilateral lower extremities.  He did have a history of bowel and bladder retention which transition to incontinence but this is improved in the last week.  He also had blurry vision and diplopia but this is also improved.    He  compressions  Extensive discussion on DNR status at time of admission. Family and patient wish to continue with limited code rather than DNR-CC.           Beatrice Lares DO, PGY-2   Attending physician: Dr. Mishra    NOTE: This report was transcribed using voice recognition software. Every effort was made to ensure accuracy; however, inadvertent computerized transcription errors may be present.      [facility]  Department of Pulmonary, Critical Care and Sleep Medicine  [facility]  Department of Internal Medicine  Attending Statement    This patient has a high probability of sudden clinically significant deterioration, which requires the highest level of physician preparedness to intervene urgently. I managed/supervised life or organ supporting interventions that required frequent physician assessment. I devoted my full attention to the direct care of this patient for the period of time indicated below. Time I spent with family of surrogate(s) is included only if the patient was incapable of providing the necessary information or participating in medical decision making. In addition to time devoted to teaching and to any procedure. CCT 38 minutes    Suman Mishra DO, KRISTINE, FACP, FCCP

## 2024-04-27 NOTE — PROGRESS NOTES
[facility]  Internal Medicine Residency / House Medicine Service      Initial H and P    Attending Physician Statement  I have discussed the case, including pertinent history and exam findings with the resident and the team. I have reviewed all significant past medical history, surgical history, social history, family history, allergies, and home medications independently.  I have seen and examined the patient and the key elements of the encounter have been performed by me.  I agree with the assessment, plan and orders as documented by the resident.      Case Discussed During AM Rounds    Accepted for admission by day team rounding led by Dr. Weiss    Covering for Dr. Weiss and House Team 1 this weekend    Admitted to MICU for bilateral pulmonary embolism    Following with [facility] for recent malignancy diagnosis- was initiated with hospice during last discharge    Remains in MICU this am    States feeling improved compared to yesterday    Goals of care coordination needed at this time     Bilateral Pulmonary Embolism     Follow-up echo did not show signs of right heart strain initially concerning on CTA of chest    Hep gtt continued at this time   Pall Care consultation to discuss and coordinate plan of care/goals of care given hospice management prior to presentation     Elevated Troponin on presentation    No change in 2nd troponin level   Delta negative     Acute Hypoxic Respiratory Failure likely secondary to Bilateral PE on presentation    Oxygen wean protocol    Monitor respiratory status     Bilateral Lower Extremity Weakness   Previous recent admission to [facility]   Extensive work-up and follow-up with Neurology at [facility]     Pancreatic Cancer s/p body and tail partial resection   Following with [facility]     Renal Cell Carcinoma s/p intervention with ablation       Remainder of medical problems as per resident note.  Attending note is in collaboration with resident-physician Dr. Arlene East

## 2024-04-27 NOTE — CONSULTS
Palliative Care Department  [phone]  Palliative Care Initial Consult  Provider Margo Ponce, APRN - CNP     Luis Seo  [id number]  Hospital Day: 2  Date of Initial Consult: 4/27/2024  Referring Provider: Charleen Jamil MD   Palliative Medicine was consulted for assistance with: Goals of Care, End Stage Disease    HPI:   Luis Seo is a 74 y.o. with a medical history of pancreatic adenocarcinoma and renal cell carcinoma s/p resection with known persistent positive margins and microwave ablation who was admitted on 4/26/2024 from home with a CHIEF COMPLAINT of BLE weakness and fatigue.  Patient with bilateral PE.  Patient was started on heparin drip and admitted to ICU.  Palliative medicine consulted for goals of care and end-stage disease.    ASSESSMENT/PLAN:     Pertinent Hospital Diagnoses     Bilateral PE  Hx pancreatic adenocarcinoma and renal cell carcinoma s/p resection and microwave ablation      Palliative Care Encounter / Counseling Regarding Goals of Care  Please see detailed goals of care discussion as below  At this time, Luis Seo, Does Not have capacity for medical decision-making.  Capacity is time limited and situation/question specific  During encounter, Dee, daughter, was surrogate medical decision-maker  Outcome of goals of care meeting:   Continue limited code and current management  Watch and wait progress  Goal is for patient to get strong enough to discharge to rehab  Code status Limited :no intubation, no compressions, no defibrillation, no resuscitative medications  Advanced Directives: POA and living will in [facility]  Surrogate/Legal NOK:  Dee Elias, daughter, [phone]    Spiritual assessment: no spiritual distress identified  Bereavement and grief: to be determined  Referrals to: none today  SUBJECTIVE:     Current medical issues leading to Palliative Medicine involvement include   Active Hospital Problems    Diagnosis Date Noted    Acute  of care, diagnosis and prognosis, and see above.    Thank you for allowing Palliative Medicine to participate in the care of Luis Seo.

## 2024-04-27 NOTE — PROGRESS NOTES
4 Eyes Skin Assessment     NAME:  Luis Seo  YOB: 1950  MEDICAL RECORD NUMBER:  [id number]    The patient is being assessed for  Admission    I agree that at least one RN has performed a thorough Head to Toe Skin Assessment on the patient. ALL assessment sites listed below have been assessed.      Areas assessed by both nurses:    Sacrum. Buttock, Coccyx, Ischium        Does the Patient have a Wound? Yes wound(s) were present on assessment. LDA wound assessment was Initiated and completed by RN       Eyad Prevention initiated by RN: Yes  Wound Care Orders initiated by RN: Yes    Pressure Injury (Stage 3,4, Unstageable, DTI, NWPT, and Complex wounds) if present, place Wound referral order by RN under : Yes    New Ostomies, if present place, Ostomy referral order under : No     Nurse 1 eSignature: Electronically signed by Carole Davis RN on 4/26/24 at 8:05 PM EDT    **SHARE this note so that the co-signing nurse can place an eSignature**    Nurse 2 eSignature: Electronically signed by Eleonora Parr RN on 4/26/24 at 8:12 PM EDT

## 2024-04-27 NOTE — ACP (ADVANCE CARE PLANNING)
Advance Care Planning      Palliative Medicine Provider (MD/NP)  Advance Care Planning (ACP) Conversation      Date of Conversation: 04/27/24  The patient and/or authorized decision maker consented to a voluntary Advance Care Planning conversation.   Individuals present for the conversation:   Patient and Legal healthcare agent named below    Legal Healthcare Agent(s):  Dee Elias, daughter, [phone]    ACP documents available in EMR prior to discussion:  -Power of  for Healthcare  -Living Will    Primary Palliative Diagnosis(es):  Pancreatic adenocarcinoma  RCC    Conversation Summary:  Discussed with Dee regarding goals of care for patient.  She explains that [facility] oncology has told them that patient's cancer will likely return within 1 year.  She is hopeful for patient's quality of life to be the best possible at this time.  Plan is to watch and wait for clinical progression while patient is admitted.  Goal is for patient to be strong enough to discharge to rehab.  Patient was being followed by hospice at home however Dee feels that patient was being overmedicated.  Pending patient's progress, she may be interested in speaking with [facility] for information only.    Resuscitation Status:    Code Status: Limited    Outcomes / Completed Documentation:  An explanation of advance directives and their importance was provided and the following forms completed:    -No new documents completed.    If new document completed, original was provided to patient and/or family member.    Copy was placed for scanning into the [facility] EMR.      I spent 25 minutes providing separately identifiable ACP services with the patient and/or surrogate decision maker in a voluntary, in-person conversation discussing the patient's wishes and goals as detailed in the above note.       Margo Ponce, APRN - CNP

## 2024-04-27 NOTE — PROGRESS NOTES
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     Weight:   Wt Readings from Last 1 Encounters:   04/26/24 84.1 kg (185 lb 6.5 oz)       TSH:  No results found for: \"TSH\"    Hepatic Function Panel:                            Lab Results   Component Value Date/Time    ALKPHOS 100 04/26/2024 11:34 AM    ALT 16 04/26/2024 11:34 AM    AST 35 04/26/2024 11:34 AM    PROT 5.8 04/26/2024 11:34 AM    BILITOT 0.7 04/26/2024 11:34 AM       Current significant warfarin drug-drug interactions include: No significant interactions    Recent Labs     04/26/24  1015 04/27/24  0545   HGB 13.6 11.2*    293     Date Warfarin Dose INR Heparin or LMWH Comment   4/27 <2.5 mg> 1.5 Heparin gtt                                  Assessment:  Patient is a 74 y.o. male new start on warfarin for New Pulmonary Embolism. Patient is currently on heparin gtt, plan is to bridge with warfarin starting today.   Goal INR 2 - 3  INR 1.5 today    Plan:  Warfarin 2.5 mg tonight  Daily PT/INR until the INR is stable within the therapeutic range  Pharmacist will follow and monitor/adjust dosing as necessary    Thank you for this consult,    Jasmin Huffman, PharmD, [facility]  PGY1 Pharmacy Resident   [phone]

## 2024-04-27 NOTE — PLAN OF CARE
Problem: Safety - Adult  Goal: Free from fall injury  4/27/2024 1025 by Lyssa Stevens, RN  Outcome: Progressing     Problem: ABCDS Injury Assessment  Goal: Absence of physical injury  4/27/2024 1025 by Lyssa Stevens, RN  Outcome: Progressing     Problem: Discharge Planning  Goal: Discharge to home or other facility with appropriate resources  Outcome: Progressing     Problem: Skin/Tissue Integrity  Goal: Absence of new skin breakdown  Description: 1.  Monitor for areas of redness and/or skin breakdown  2.  Assess vascular access sites hourly  3.  Every 4-6 hours minimum:  Change oxygen saturation probe site  4.  Every 4-6 hours:  If on nasal continuous positive airway pressure, respiratory therapy assess nares and determine need for appliance change or resting period.  4/27/2024 1025 by Lyssa Stevens, RN  Outcome: Progressing

## 2024-04-28 LAB
ANION GAP SERPL CALCULATED.3IONS-SCNC: 11 MMOL/L (ref 7–16)
BASOPHILS # BLD: 0.07 K/UL (ref 0–0.2)
BASOPHILS NFR BLD: 1 % (ref 0–2)
BUN SERPL-MCNC: 9 MG/DL (ref 6–23)
CALCIUM SERPL-MCNC: 10 MG/DL (ref 8.6–10.2)
CHLORIDE SERPL-SCNC: 102 MMOL/L (ref 98–107)
CO2 SERPL-SCNC: 28 MMOL/L (ref 22–29)
CREAT SERPL-MCNC: 0.6 MG/DL (ref 0.7–1.2)
EOSINOPHIL # BLD: 0.35 K/UL (ref 0.05–0.5)
EOSINOPHILS RELATIVE PERCENT: 5 % (ref 0–6)
ERYTHROCYTE [DISTWIDTH] IN BLOOD BY AUTOMATED COUNT: 14.7 % (ref 11.5–15)
GFR SERPL CREATININE-BSD FRML MDRD: >90 ML/MIN/1.73M2
GLUCOSE BLD-MCNC: 104 MG/DL (ref 74–99)
GLUCOSE BLD-MCNC: 109 MG/DL (ref 74–99)
GLUCOSE BLD-MCNC: 126 MG/DL (ref 74–99)
GLUCOSE BLD-MCNC: 130 MG/DL (ref 74–99)
GLUCOSE SERPL-MCNC: 100 MG/DL (ref 74–99)
HCT VFR BLD AUTO: 36.3 % (ref 37–54)
HGB BLD-MCNC: 11.4 G/DL (ref 12.5–16.5)
IMM GRANULOCYTES # BLD AUTO: 0.04 K/UL (ref 0–0.58)
IMM GRANULOCYTES NFR BLD: 1 % (ref 0–5)
INR PPP: 1.4
LYMPHOCYTES NFR BLD: 1.52 K/UL (ref 1.5–4)
LYMPHOCYTES RELATIVE PERCENT: 23 % (ref 20–42)
MAGNESIUM SERPL-MCNC: 2 MG/DL (ref 1.6–2.6)
MCH RBC QN AUTO: 27.3 PG (ref 26–35)
MCHC RBC AUTO-ENTMCNC: 31.4 G/DL (ref 32–34.5)
MCV RBC AUTO: 87.1 FL (ref 80–99.9)
MONOCYTES NFR BLD: 0.78 K/UL (ref 0.1–0.95)
MONOCYTES NFR BLD: 12 % (ref 2–12)
NEUTROPHILS NFR BLD: 59 % (ref 43–80)
NEUTS SEG NFR BLD: 3.99 K/UL (ref 1.8–7.3)
PARTIAL THROMBOPLASTIN TIME: 49.3 SEC (ref 24.5–35.1)
PARTIAL THROMBOPLASTIN TIME: 76.6 SEC (ref 24.5–35.1)
PHOSPHATE SERPL-MCNC: 2.6 MG/DL (ref 2.5–4.5)
PLATELET # BLD AUTO: 305 K/UL (ref 130–450)
PMV BLD AUTO: 10.3 FL (ref 7–12)
POTASSIUM SERPL-SCNC: 3.7 MMOL/L (ref 3.5–5)
PROTHROMBIN TIME: 15.2 SEC (ref 9.3–12.4)
RBC # BLD AUTO: 4.17 M/UL (ref 3.8–5.8)
SODIUM SERPL-SCNC: 141 MMOL/L (ref 132–146)
WBC OTHER # BLD: 6.8 K/UL (ref 4.5–11.5)

## 2024-04-28 PROCEDURE — 85730 THROMBOPLASTIN TIME PARTIAL: CPT

## 2024-04-28 PROCEDURE — 2140000000 HC CCU INTERMEDIATE R&B

## 2024-04-28 PROCEDURE — 2700000000 HC OXYGEN THERAPY PER DAY

## 2024-04-28 PROCEDURE — 83735 ASSAY OF MAGNESIUM: CPT

## 2024-04-28 PROCEDURE — 6370000000 HC RX 637 (ALT 250 FOR IP)

## 2024-04-28 PROCEDURE — 82962 GLUCOSE BLOOD TEST: CPT

## 2024-04-28 PROCEDURE — 84100 ASSAY OF PHOSPHORUS: CPT

## 2024-04-28 PROCEDURE — 2580000003 HC RX 258

## 2024-04-28 PROCEDURE — 36415 COLL VENOUS BLD VENIPUNCTURE: CPT

## 2024-04-28 PROCEDURE — 80048 BASIC METABOLIC PNL TOTAL CA: CPT

## 2024-04-28 PROCEDURE — 85025 COMPLETE CBC W/AUTO DIFF WBC: CPT

## 2024-04-28 PROCEDURE — 85610 PROTHROMBIN TIME: CPT

## 2024-04-28 PROCEDURE — 99232 SBSQ HOSP IP/OBS MODERATE 35: CPT | Performed by: INTERNAL MEDICINE

## 2024-04-28 PROCEDURE — 6360000002 HC RX W HCPCS

## 2024-04-28 RX ORDER — WARFARIN SODIUM 5 MG/1
2.5 TABLET ORAL
Status: COMPLETED | OUTPATIENT
Start: 2024-04-28 | End: 2024-04-28

## 2024-04-28 RX ADMIN — WARFARIN SODIUM 2.5 MG: 5 TABLET ORAL at 12:15

## 2024-04-28 RX ADMIN — PANTOPRAZOLE SODIUM 40 MG: 40 TABLET, DELAYED RELEASE ORAL at 05:15

## 2024-04-28 RX ADMIN — ONDANSETRON 4 MG: 4 TABLET, ORALLY DISINTEGRATING ORAL at 16:16

## 2024-04-28 RX ADMIN — HEPARIN SODIUM 9 UNITS/KG/HR: 10000 INJECTION, SOLUTION INTRAVENOUS at 12:15

## 2024-04-28 RX ADMIN — HEPARIN SODIUM 9 UNITS/KG/HR: 10000 INJECTION, SOLUTION INTRAVENOUS at 12:31

## 2024-04-28 RX ADMIN — QUETIAPINE FUMARATE 50 MG: 25 TABLET ORAL at 20:57

## 2024-04-28 RX ADMIN — SODIUM CHLORIDE, PRESERVATIVE FREE 10 ML: 5 INJECTION INTRAVENOUS at 20:58

## 2024-04-28 RX ADMIN — TAMSULOSIN HYDROCHLORIDE 0.4 MG: 0.4 CAPSULE ORAL at 07:41

## 2024-04-28 RX ADMIN — SODIUM CHLORIDE, PRESERVATIVE FREE 10 ML: 5 INJECTION INTRAVENOUS at 07:41

## 2024-04-28 NOTE — PROGRESS NOTES
[facility]  Internal Medicine Residency / House Medicine Service      Attending Physician Statement  I have discussed the case, including pertinent history and exam findings with the resident and the team. I have seen and examined the patient and the key elements of the encounter have been performed by me.  I agree with the assessment, plan and orders as documented by the resident.      Case Discussed During AM Rounds    [facility]    Hematology/Oncology and Neurology consultations placed    H/H remains stable    Heparin gtt continued at this time    Pharmacy was consulted for initiation of warfarin dosing- initiated by MICU team    Seen bedside; respiratory and neurologic status stable today     Bilateral Pulmonary Embolism     Follow-up echo did not show signs of right heart strain initially concerning on CTA of chest    Hep gtt continued at this time with initial plan to transition to warfarin after discussed with patient/family by MICU team     Data suggests suboptimal management with DOAC therapy in the presence of pancreatic cancer- Hematology assessment to follow   Pharmacy consulted for dosing    Family coordination of care ongoing   PT/OT evaluation    Pall Care following    Oncology assessment pending     Elevated Troponin on presentation- currently stable     Acute Hypoxic Respiratory Failure likely secondary to Bilateral PE on presentation    Oxygen wean protocol    Monitor respiratory status     Bilateral Lower Extremity Weakness   Previous recent admission to [facility]   Extensive work-up and follow-up with Neurology at [facility]     Pancreatic Cancer s/p body and tail partial resection   Following with [facility]     Renal Cell Carcinoma s/p intervention with ablation       Remainder of medical problems as per resident note.  Attending note is in collaboration with  follow-up progress note by resident-physician Dr. Arcos On 4/28/24.     Kris Blas MD, FACP   Internal Medicine

## 2024-04-28 NOTE — PROGRESS NOTES
Patient arrived on floor with blanket, power toothbrush, documents, reading glasses, brush, cell, , and documents for legal power of . Instructed patient on use of call light.    Alex Culver RN

## 2024-04-28 NOTE — PROGRESS NOTES
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     Weight:   Wt Readings from Last 1 Encounters:   04/28/24 84.5 kg (186 lb 3.2 oz)       TSH:  No results found for: \"TSH\"    Hepatic Function Panel:                            Lab Results   Component Value Date/Time    ALKPHOS 100 04/26/2024 11:34 AM    ALT 16 04/26/2024 11:34 AM    AST 35 04/26/2024 11:34 AM    PROT 5.8 04/26/2024 11:34 AM    BILITOT 0.7 04/26/2024 11:34 AM       Current significant warfarin drug-drug interactions include: No significant interactions    Recent Labs     04/26/24  1015 04/27/24  0545 04/27/24  1801 04/28/24  0540   HGB 13.6 11.2* 11.6* 11.4*    293  --  305       Date Warfarin Dose INR Heparin or LMWH Comment   4/27 2.5 mg 1.5 Heparin gtt    4/28 <2.5 mg> 1.4 Heparin gtt                           Assessment:  Patient is a 74 y.o. male new start on warfarin for New Pulmonary Embolism. Patient is currently on heparin gtt, plan is to bridge with warfarin starting today.   Goal INR 2 - 3  INR 1.4 today    Plan:  Warfarin 2.5 mg today  Daily PT/INR until the INR is stable within the therapeutic range  Pharmacist will follow and monitor/adjust dosing as necessary    Thank you for this consult,    Jasmin Huffman, PharmD, [facility]  PGY1 Pharmacy Resident   [phone]

## 2024-04-28 NOTE — CONSULTS
[facility] [place]  Hematology/Oncology  Consult      Patient Name: Luis Seo  YOB: 1950  PCP: Brad Bassett MD   Referring Provider:      Reason for Consultation:   Chief Complaint   Patient presents with    Fatigue     Bilateral leg weakness, fatigue, Pancreatic CA had lrg portion removed 3 weeks ago. Denies pain, 87% on RA denies SOB        History of Present Illness: This is a 74-year-male patient pancreatic adenocarcinoma and renal cell carcinoma with recent pancreatectomy and microwave ablation at [facility] who presents with concern of fatigue.  He had pancreatectomy and ablation done mid March and upon returning home 2 days later had profound weakness in bilateral upper and lower extremities.  His lower extremities eventually began having hyperesthesia.  He regained function of his upper extremities after about a month.  His bilateral lower extremities continue to be weak.  He also had diplopia at that time.  Extensive workup at [facility] revealed inflammation on MRI with extensive enhancement of the leptomeninges but serologic workup and lumbar puncture were unrevealing for cause.  He was started on steroids at 1 point.  Also consideration for GBS and IVIG at that time however patient decided not to start the treatment due to concerns for renal failure. It was thought that with his positive margins the chance of recurrence was exceptionally high. It was explained that for most patients recurrence occurs within 6 to 18 months of surgery. Median survival at the time of recurrence is often 3 to 6 months. Adjuvant chemotherapy after surgical resection and radiation for positive margins is usually considered, however it was felt that patient would not be able to tolerate these treatments secondary to his neurological debilitation. He was with comfort care. In the ED a CTA pulmonary showed bilateral pulmonary emboli with significant clot burden and concern for right ventricular strain on CTA.  ventricular strain  - On heparin gtt and transferred to the ICU for closer monitoring. He is on a warfarin bridge.  -  Neurology consulted.   - Palliative also consulted and patient is considering hospice.   - ProBNP 1044, troponin 74. Cardiology consulted.   - LFT's and CMP unremarkable. CBC with Hgb 11.5 otherwise unremarkable.     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  Not a candidate for adjuvant therapy at this time due to poor performance status.    Thank you for the consult, will follow.          DARIUS Posada - CNP  Electronically signed 4/28/2024 at 11:14 AM  Patient seen and examined, note edited  Emiliano Trammell MD

## 2024-04-28 NOTE — PROGRESS NOTES
4 Eyes Skin Assessment     NAME:  Luis Seo  YOB: 1950  MEDICAL RECORD NUMBER:  [id number]    The patient is being assessed for  Transfer to New Unit    I agree that at least one RN has performed a thorough Head to Toe Skin Assessment on the patient. ALL assessment sites listed below have been assessed.      Areas assessed by both nurses:    Head, Face, Ears, Shoulders, Back, Chest, Arms, Elbows, Hands, Sacrum. Buttock, Coccyx, Ischium, and Legs. Feet and Heels    No open wounds noted.        Does the Patient have a Wound? No noted wound(s)       Eyad Prevention initiated by RN: No  Wound Care Orders initiated by RN: No    Pressure Injury (Stage 3,4, Unstageable, DTI, NWPT, and Complex wounds) if present, place Wound referral order by RN under : No    New Ostomies, if present place, Ostomy referral order under : No     Nurse 1 eSignature: Electronically signed by Alex Culver RN on 4/28/24 at 4:51 AM EDT    **SHARE this note so that the co-signing nurse can place an eSignature**    Nurse 2 eSignature: Electronically signed by Anais East RN on 4/28/24 at 2:57 PM EDT

## 2024-04-28 NOTE — PROGRESS NOTES
Comprehensive Nutrition Assessment    Type and Reason for Visit:  Initial, Positive Nutrition Screen    Nutrition Recommendations/Plan:     ADAT - swallow eval for appropriate diet vs supplemental EN support?    Start Oral Nutrition Supplements x 2 for protein variety       Malnutrition Assessment:  Malnutrition Status:  At risk for malnutrition (04/28/24 1327)    Context:  Acute Illness     Findings of the 6 clinical characteristics of malnutrition:  Energy Intake:  50% or less of estimated energy requirements for 5 or more days (x ~ 1mon)  Weight Loss:  Unable to assess (no EMR measured wts on file or per [facility] encounter reviews)     Body Fat Loss:  Unable to assess     Muscle Mass Loss:  Unable to assess    Fluid Accumulation:  No significant fluid accumulation     Strength:  Not Performed    Nutrition Assessment:    Pt admit w/ fatigue & weakness found to have B/L pulmonary embolism.  PMHx BPH, Pancreatic CA w/ recent pancreatectomy x ~1 mon ago & Renal Cell CA s/p intervention/ ablation (follows @ [facility]). Noted dysphagia apparently on liquid diet x 1 mon PTA. CLD currently, will add Ensure Clear & Gelatein TID to provide protein variety at every meal. Consider swallow eval vs nutrition support to help restore nutritional status? Will follow.    Nutrition Related Findings:    Pt alert, -I/O's, nonpitting edema, Abd WDL     Wound Type: None       Current Nutrition Intake & Therapies:    Average Meal Intake: 1-25% (liquid diet x 1 mon PTA)     ADULT DIET; Clear Liquid    Anthropometric Measures:  Height: 177.8 cm (5' 10\")  Ideal Body Weight (IBW): 166 lbs (75 kg)    Admission Body Weight: 83.9 kg (185 lb) (4/26 first measured)  Current Body Weight: 84.5 kg (186 lb 3.2 oz) (4/28 measured), 112.2 % IBW.    Current BMI (kg/m2): 26.7  Usual Body Weight:  (UTO no EMR wt hx on file. Also no measured wt per [facility] x 1 year back reviewed)                       BMI Categories: Overweight (BMI

## 2024-04-28 NOTE — CONSULTS
NEUROLOGY CONSULT NOTE      Requesting Physician:  Anais Kennedy MD    Reason for Consult:  Evaluate for b/l lower and upper extremity weakness; MRI enhancement within each distal IAC ; paraneoplastic concern due to Pancreatic CA and RCC.     History of Present Illness:  Luis Seo is a 74 y.o. male  with h/o pancreatic cancer, A-fib and previously on Xarelto, AAA, HTN, HLD, BPH, who was admitted to [facility] on 4/26/2024 with presentation of generalized weakness and fatigue.  Patient with history of incidental finding of the pancreatic body/tail mass measuring 2.8 cm in size along with a 2.8 cm left renal neoplasm on 12/11/2023.  MRI/MRCP on 1/11/2024 confirmed 2.6 cm pancreatic body mass believed to be neoplastic with EUS on 2/12 and 2/22/2024 showing atypical cells.  Patient subsequently underwent laparoscopic abdominal surgery for pancreatic adenocarcinoma with resection and ablation of both pancreatic and renal masses on 3/8/2024.  There was note of some postsurgery wound site healing problems that was medically managed.  He subsequently presented to the ED on 3/18/2024 at [facility] with bilateral hand paresthesias, generalized weakness, and shortness of breath with workup concerning for possible leptomeningeal disease and cranial neuropathy.  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  Lumbar puncture done 3/27/2024 showed 2 WBCs, 30 or RBCs, glucose of 117 and protein of 163.  CSF cytology showed scattered monocytes and mature lymphocytes with no malignant cells.  Patient was discharged to hospice care and pain management with report of generalized weakness and legs.  Since that time he feels that he has been getting weaker and more fatigued with reported no significant energy level.  He is currently living at home with his daughter with report of significant lack of mobility as a result of his condition.  He was brought to the ED for further evaluation where he was noted to be hypoxic.  CTA performed showed

## 2024-04-28 NOTE — PROGRESS NOTES
[facility]  Internal Medicine Residency Program  Progress Note - House Team       Patient:  Luis Seo 74 y.o. male   MRN: [id number]       Date of Service: 4/28/2024    CC: Fatigue and Weakness   Overnight events: Transferred out of ICU. Started bridging to warfarin yesterday. Palliative care met with patient and daughter Dee. Goal of care is to improve strength for discharge to rehab.     Subjective     Patient seen and examined at bedside this a.m. States that he slept 10 hours and feels well. Denies SOB at this time. Fatigue and weakness has improved from admission but still present. Denies F/C/N/V/CP/Abd pain.     Objective       Physical Exam  Vitals: /81   Pulse 80   Temp 97.6 °F (36.4 °C) (Temporal)   Resp 20   Ht 1.778 m (5' 10\")   Wt 84.5 kg (186 lb 3.2 oz)   SpO2 97%   BMI 26.72 kg/m²     I & O - 24hr: I/O this shift:  In: -   Out: 250 [Urine:250]   General Appearance: alert, appears stated age, and cooperative.  On 2 L NC O2  HEENT:  Head: Normal, normocephalic, atraumatic.  Neck: no carotid bruit and supple, symmetrical, trachea midline  Lung: clear to auscultation bilaterally  Heart: regular rate and rhythm and systolic murmur: systolic ejection 2/6, 5th intercostal space mid clavicular line     Abdomen: soft, non-tender; bowel sounds normal; no masses,  no organomegaly  Extremities:  extremities normal, atraumatic, no cyanosis or edema  Musculokeletal: 4/5 Strength in BUE, 4/5 Strength in RLE, 3/5 Strength in LLE. Sensation intact. No joint swelling, no muscle tenderness.   Neurologic: Mental status: Alert, oriented, thought content appropriate    Diet:   ADULT DIET; Clear Liquid      Pertinent Labs & Imaging Studies     Labs    CBC with Differential:    Lab Results   Component Value Date/Time    WBC 6.8 04/28/2024 05:40 AM    RBC 4.17 04/28/2024 05:40 AM    HGB 11.4 04/28/2024 05:40 AM    HCT 36.3 04/28/2024 05:40 AM     04/28/2024 05:40 AM    MCV  evaluation: pending   DVT prophylaxis: Heparin and Warfarin   GI prophylaxis: Protonix   Diet:   ADULT DIET; Clear Liquid   Bowel regimen: Glycolax   Pain management: as needed  Code status: Limited   Disposition: Continue Current Care  Family: updated as available    Dee Arcos MD, PGY-1   Attending physician: Dr. Blas

## 2024-04-29 LAB
ANION GAP SERPL CALCULATED.3IONS-SCNC: 9 MMOL/L (ref 7–16)
BASOPHILS # BLD: 0.08 K/UL (ref 0–0.2)
BASOPHILS NFR BLD: 1 % (ref 0–2)
BUN SERPL-MCNC: 8 MG/DL (ref 6–23)
CALCIUM SERPL-MCNC: 10.2 MG/DL (ref 8.6–10.2)
CHLORIDE SERPL-SCNC: 104 MMOL/L (ref 98–107)
CO2 SERPL-SCNC: 29 MMOL/L (ref 22–29)
CREAT SERPL-MCNC: 0.6 MG/DL (ref 0.7–1.2)
EOSINOPHIL # BLD: 0.36 K/UL (ref 0.05–0.5)
EOSINOPHILS RELATIVE PERCENT: 5 % (ref 0–6)
ERYTHROCYTE [DISTWIDTH] IN BLOOD BY AUTOMATED COUNT: 14.7 % (ref 11.5–15)
GFR SERPL CREATININE-BSD FRML MDRD: >90 ML/MIN/1.73M2
GLUCOSE BLD-MCNC: 128 MG/DL (ref 74–99)
GLUCOSE BLD-MCNC: 139 MG/DL (ref 74–99)
GLUCOSE BLD-MCNC: 156 MG/DL (ref 74–99)
GLUCOSE BLD-MCNC: 99 MG/DL (ref 74–99)
GLUCOSE SERPL-MCNC: 106 MG/DL (ref 74–99)
HCT VFR BLD AUTO: 35.9 % (ref 37–54)
HGB BLD-MCNC: 11.5 G/DL (ref 12.5–16.5)
IMM GRANULOCYTES # BLD AUTO: 0.04 K/UL (ref 0–0.58)
IMM GRANULOCYTES NFR BLD: 1 % (ref 0–5)
INR PPP: 1.5
LYMPHOCYTES NFR BLD: 1.46 K/UL (ref 1.5–4)
LYMPHOCYTES RELATIVE PERCENT: 21 % (ref 20–42)
MAGNESIUM SERPL-MCNC: 1.9 MG/DL (ref 1.6–2.6)
MCH RBC QN AUTO: 27.8 PG (ref 26–35)
MCHC RBC AUTO-ENTMCNC: 32 G/DL (ref 32–34.5)
MCV RBC AUTO: 86.7 FL (ref 80–99.9)
MONOCYTES NFR BLD: 0.7 K/UL (ref 0.1–0.95)
MONOCYTES NFR BLD: 10 % (ref 2–12)
NEUTROPHILS NFR BLD: 61 % (ref 43–80)
NEUTS SEG NFR BLD: 4.19 K/UL (ref 1.8–7.3)
PARTIAL THROMBOPLASTIN TIME: 45.8 SEC (ref 24.5–35.1)
PARTIAL THROMBOPLASTIN TIME: 78 SEC (ref 24.5–35.1)
PARTIAL THROMBOPLASTIN TIME: 97.9 SEC (ref 24.5–35.1)
PHOSPHATE SERPL-MCNC: 3.2 MG/DL (ref 2.5–4.5)
PLATELET # BLD AUTO: 272 K/UL (ref 130–450)
PMV BLD AUTO: 11.3 FL (ref 7–12)
POTASSIUM SERPL-SCNC: 3.5 MMOL/L (ref 3.5–5)
PROTHROMBIN TIME: 16.8 SEC (ref 9.3–12.4)
RBC # BLD AUTO: 4.14 M/UL (ref 3.8–5.8)
SODIUM SERPL-SCNC: 142 MMOL/L (ref 132–146)
WBC OTHER # BLD: 6.8 K/UL (ref 4.5–11.5)

## 2024-04-29 PROCEDURE — 84100 ASSAY OF PHOSPHORUS: CPT

## 2024-04-29 PROCEDURE — 80048 BASIC METABOLIC PNL TOTAL CA: CPT

## 2024-04-29 PROCEDURE — 82962 GLUCOSE BLOOD TEST: CPT

## 2024-04-29 PROCEDURE — 6370000000 HC RX 637 (ALT 250 FOR IP): Performed by: INTERNAL MEDICINE

## 2024-04-29 PROCEDURE — 6370000000 HC RX 637 (ALT 250 FOR IP)

## 2024-04-29 PROCEDURE — 85025 COMPLETE CBC W/AUTO DIFF WBC: CPT

## 2024-04-29 PROCEDURE — 36415 COLL VENOUS BLD VENIPUNCTURE: CPT

## 2024-04-29 PROCEDURE — 99232 SBSQ HOSP IP/OBS MODERATE 35: CPT | Performed by: NURSE PRACTITIONER

## 2024-04-29 PROCEDURE — 85610 PROTHROMBIN TIME: CPT

## 2024-04-29 PROCEDURE — 6360000002 HC RX W HCPCS

## 2024-04-29 PROCEDURE — 2700000000 HC OXYGEN THERAPY PER DAY

## 2024-04-29 PROCEDURE — 85730 THROMBOPLASTIN TIME PARTIAL: CPT

## 2024-04-29 PROCEDURE — 83735 ASSAY OF MAGNESIUM: CPT

## 2024-04-29 PROCEDURE — 99223 1ST HOSP IP/OBS HIGH 75: CPT | Performed by: PSYCHIATRY & NEUROLOGY

## 2024-04-29 PROCEDURE — 2140000000 HC CCU INTERMEDIATE R&B

## 2024-04-29 RX ORDER — LIDOCAINE 4 G/G
5 PATCH TOPICAL DAILY
Status: DISCONTINUED | OUTPATIENT
Start: 2024-04-29 | End: 2024-04-29 | Stop reason: ALTCHOICE

## 2024-04-29 RX ORDER — LIDOCAINE 4 G/G
1 PATCH TOPICAL DAILY
Status: DISCONTINUED | OUTPATIENT
Start: 2024-04-29 | End: 2024-04-30

## 2024-04-29 RX ORDER — WARFARIN SODIUM 5 MG/1
2.5 TABLET ORAL
Status: COMPLETED | OUTPATIENT
Start: 2024-04-29 | End: 2024-04-29

## 2024-04-29 RX ADMIN — QUETIAPINE FUMARATE 50 MG: 25 TABLET ORAL at 20:41

## 2024-04-29 RX ADMIN — PETROLATUM: 420 OINTMENT TOPICAL at 20:41

## 2024-04-29 RX ADMIN — HEPARIN SODIUM 11 UNITS/KG/HR: 10000 INJECTION, SOLUTION INTRAVENOUS at 08:11

## 2024-04-29 RX ADMIN — PANTOPRAZOLE SODIUM 40 MG: 40 TABLET, DELAYED RELEASE ORAL at 05:35

## 2024-04-29 RX ADMIN — POLYETHYLENE GLYCOL 3350 17 G: 17 POWDER, FOR SOLUTION ORAL at 20:41

## 2024-04-29 RX ADMIN — HEPARIN SODIUM 11 UNITS/KG/HR: 10000 INJECTION, SOLUTION INTRAVENOUS at 17:00

## 2024-04-29 RX ADMIN — TAMSULOSIN HYDROCHLORIDE 0.4 MG: 0.4 CAPSULE ORAL at 08:13

## 2024-04-29 RX ADMIN — ACETAMINOPHEN 650 MG: 325 TABLET ORAL at 08:20

## 2024-04-29 RX ADMIN — WARFARIN SODIUM 2.5 MG: 5 TABLET ORAL at 08:21

## 2024-04-29 RX ADMIN — HEPARIN SODIUM 4400 UNITS: 1000 INJECTION INTRAVENOUS; SUBCUTANEOUS at 08:09

## 2024-04-29 ASSESSMENT — PAIN DESCRIPTION - DESCRIPTORS: DESCRIPTORS: ACHING;DISCOMFORT

## 2024-04-29 ASSESSMENT — PAIN SCALES - GENERAL: PAINLEVEL_OUTOF10: 3

## 2024-04-29 ASSESSMENT — PAIN DESCRIPTION - ORIENTATION: ORIENTATION: LOWER;MID

## 2024-04-29 ASSESSMENT — PAIN DESCRIPTION - LOCATION: LOCATION: BUTTOCKS;COCCYX

## 2024-04-29 NOTE — CONSULTS
[facility]  Neurology Consult    Date:  4/29/2024  Patient Name:  Luis Seo  YOB: 1950  MRN: [id number]     PCP:  Brad Bassett MD   Referring:  No ref. provider found      Chief Complaint: Bilateral lower extremity weakness, fatigue    History obtained from: Patient and daughter    Assessment  Luis Seo is a 74 y.o. male with past medical history of pancreatic cancer, renal cell cancer, atrial fibrillation, AAA, hypertension, hyperlipidemia and BPH who was admitted 4/26/2024 for concerns of extremity weakness and fatigue. Symptoms at the present since over a month ago, 10 days after laparoscopic abdominal surgery for pancreatic adenocarcinoma with resection and ablation of both pancreatic and renal masses on 3/8/2024 at [facility].    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 right handed male presenting for evaluation of extremity weakness and fatigue. Symptoms at the present since over a month ago, 10 days after laparoscopic abdominal surgery for pancreatic adenocarcinoma with resection and ablation of both pancreatic and renal masses on 3/8/2024 at [facility]. Initially had both upper and lower extremity weakness with associated paresthesia and numbness involving both upper and lower extremities. There was also associated diplopia, dysphagia and dysarthria.  He had extensive evaluation at [facility] including MRI which showed extensive enhancement of the leptomeninges and CSF analysis showing elevated  normal bilaterally  Heel to shin testing normal bilaterally    Gait  Deferred for safety/fall consideration    Labs  Recent Labs     04/29/24  0620      K 3.5      CO2 29   BUN 8   CREATININE 0.6*   GLUCOSE 106*   CALCIUM 10.2   WBC 6.8   RBC 4.14   HGB 11.5*   HCT 35.9*   MCV 86.7   MCH 27.8   MCHC 32.0   RDW 14.7      MPV 11.3       Imaging  CTA PULMONARY W CONTRAST   Final Result   1. Prominent bilateral pulmonary embolism, right greater than left.  Clot   burden is high. There are findings of right ventricular strain.   2. Moderate consolidation of the posterior left lower lobe at the lung base,   probably atelectasis, but cannot exclude pneumonia.   3. Status post Whipple procedure.   4. 4.2 x 3.0 cm cystic region in the lateral portion of the residual   pancreatic head. This is probably a pseudocyst, although an abscess cannot be   entirely excluded.   5. Mucosal thickening extending from the collapsed ascending colon through   the descending colon which may be mild colitis.   6. Prominently enlarged prostate.         CT ABDOMEN PELVIS W IV CONTRAST Additional Contrast? None   Final Result   1. Prominent bilateral pulmonary embolism, right greater than left.  Clot   burden is high. There are findings of right ventricular strain.   2. Moderate consolidation of the posterior left lower lobe at the lung base,   probably atelectasis, but cannot exclude pneumonia.   3. Status post Whipple procedure.   4. 4.2 x 3.0 cm cystic region in the lateral portion of the residual   pancreatic head. This is probably a pseudocyst, although an abscess cannot be   entirely excluded.   5. Mucosal thickening extending from the collapsed ascending colon through   the descending colon which may be mild colitis.   6. Prominently enlarged prostate.         CT HEAD WO CONTRAST   Final Result   No acute intracranial abnormality.      Normal CT appearance of the head.         XR CHEST PORTABLE   Final Result   Mild

## 2024-04-29 NOTE — CARE COORDINATION
Transition of care: Admitted with acute pulmonary embolism. IV heparin gtt.  INR 1.5 and other labs noted. Coumadin as ordered per pharmacy. Hem/onc and palliative care following. O2 at 2l/nc. Hx of pancreatic and renal cell carcinoma. Met with pt's daughter, Dee, in [facility]. Dee said pt lives alone in a 1 story home. Dee or one of her siblings has been staying with pt 24/7 since his discharge from [facility]. No stairs to enter home.  Pt was active with [facility] at home and hospice services were revoked when pt came to the er dept. Left vm with [facility] regarding pt and await their return call. Needs assistance with all ADLs except pt was feeding himself prior to admission. Not a . DME- FWW, cane and a BSC. Dee said [facility] provided a hospital bed, oxygen and a bill lift. Will have PT/OT work with pt for discharge planning. If aru is recommended then she would like aru here at [facility]. If shireen recommended, I provided her with shireen list to make choices. PCP is Dr LIANET Oneill and pharmacy is [facility] in [place]. Cm/sw will follow.     Case Management Assessment  Initial Evaluation    Date/Time of Evaluation: 4/29/2024 12:48 PM  Assessment Completed by: Jacqueline Wilder RN    If patient is discharged prior to next notation, then this note serves as note for discharge by case management.    Patient Name: Luis Seo                   YOB: 1950  Diagnosis: Acute pulmonary embolism, unspecified pulmonary embolism type, unspecified whether acute cor pulmonale present (HCC) [I26.99]                   Date / Time: 4/26/2024  9:53 AM    Patient Admission Status: Inpatient   Readmission Risk (Low < 19, Mod (19-27), High > 27): Readmission Risk Score: 12.8    Current PCP: Dr Reginald Oneill   PCP verified by CM? Yes    Chart Reviewed: Yes      History Provided by: Child/Family  Patient Orientation: Other (see comment) (spoke with pt's daughter, Dee)

## 2024-04-29 NOTE — PATIENT CARE CONFERENCE
P Quality Flow/Interdisciplinary Rounds Progress Note        Quality Flow Rounds held on April 29, 2024    Disciplines Attending:  Bedside Nurse, , , and Nursing Unit Leadership    Luis Seo was admitted on 4/26/2024  9:53 AM    Anticipated Discharge Date:       Disposition:    Eyad Score:  Eyad Scale Score: 16    Readmission Risk              Risk of Unplanned Readmission:  13           Discussed patient goal for the day, patient clinical progression, and barriers to discharge.  The following Goal(s) of the Day/Commitment(s) have been identified:  discharge planning       Kirstie Lee RN  April 29, 2024

## 2024-04-29 NOTE — PROGRESS NOTES
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     Weight:   Wt Readings from Last 1 Encounters:   04/29/24 84.3 kg (185 lb 12.8 oz)       TSH:  No results found for: \"TSH\"    Hepatic Function Panel:                            Lab Results   Component Value Date/Time    ALKPHOS 100 04/26/2024 11:34 AM    ALT 16 04/26/2024 11:34 AM    AST 35 04/26/2024 11:34 AM    PROT 5.8 04/26/2024 11:34 AM    BILITOT 0.7 04/26/2024 11:34 AM       Current significant warfarin drug-drug interactions include: No significant interactions    Recent Labs     04/27/24  0545 04/27/24  1801 04/28/24  0540 04/29/24  0620   HGB 11.2* 11.6* 11.4* 11.5*     --  305 272       Date Warfarin Dose INR Heparin or LMWH Comment   4/27 2.5 mg 1.5 UFH infusion    4/28 2.5 mg 1.4 UFH infusion    4/29 2.5 mg 1.5 UFH infusion                    Assessment:  Patient is a 74 y.o. male new start on warfarin for New Pulmonary Embolism. Patient is currently on heparin gtt, plan is to bridge with warfarin starting today.   PMH: pancreatic adenoCA and RCC.  S/P Pancreatectomy/splenectomy Mar 2024.  Goal INR 2 - 3  4/29: INR = 1.5; day #3 overlap UFH-warfarin.    Plan:  Give warfarin 2.5 mg x1 again today (done).  Daily PT/INR until the INR is stable within the therapeutic range.  Pharmacist will follow and monitor/adjust dosing as necessary.    Zach Rojas, PharmD  4/29/2024  10:38 AM  [phone]

## 2024-04-29 NOTE — PROGRESS NOTES
[facility]  Hematology/Oncology  Consult      Patient Name: Luis Seo  YOB: 1950  PCP: Brad Bassett MD   Referring Provider:      Reason for Consultation:   Chief Complaint   Patient presents with    Fatigue     Bilateral leg weakness, fatigue, Pancreatic CA had lrg portion removed 3 weeks ago. Denies pain, 87% on RA denies SOB        Subjective:  Back pain/spasms. Otherwise feels fairly well    History of Present Illness: This is a 74-year-male patient pancreatic adenocarcinoma and renal cell carcinoma with recent pancreatectomy and microwave ablation at [facility] who presents with concern of fatigue.  He had pancreatectomy and ablation done mid March and upon returning home 2 days later had profound weakness in bilateral upper and lower extremities.  His lower extremities eventually began having hyperesthesia.  He regained function of his upper extremities after about a month.  His bilateral lower extremities continue to be weak.  He also had diplopia at that time.  Extensive workup at [facility] revealed inflammation on MRI with extensive enhancement of the leptomeninges but serologic workup and lumbar puncture were unrevealing for cause.  He was started on steroids at 1 point.  Also consideration for GBS and IVIG at that time however patient decided not to start the treatment due to concerns for renal failure. It was thought that with his positive margins the chance of recurrence was exceptionally high. It was explained that for most patients recurrence occurs within 6 to 18 months of surgery. Median survival at the time of recurrence is often 3 to 6 months. Adjuvant chemotherapy after surgical resection and radiation for positive margins is usually considered, however it was felt that patient would not be able to tolerate these treatments secondary to his neurological debilitation. He was with comfort care. In the ED a CTA pulmonary showed bilateral pulmonary emboli with  than left.  Clot burden is high. There are findings of right ventricular strain. 2. Moderate consolidation of the posterior left lower lobe at the lung base, probably atelectasis, but cannot exclude pneumonia. 3. Status post Whipple procedure. 4. 4.2 x 3.0 cm cystic region in the lateral portion of the residual pancreatic head. This is probably a pseudocyst, although an abscess cannot be entirely excluded. 5. Mucosal thickening extending from the collapsed ascending colon through the descending colon which may be mild colitis. 6. Prominently enlarged prostate.     CT HEAD WO CONTRAST    Result Date: 4/26/2024  EXAMINATION: CT OF THE HEAD WITHOUT CONTRAST  4/26/2024 3:19 pm TECHNIQUE: CT of the head was performed without the administration of intravenous contrast. Automated exposure control, iterative reconstruction, and/or weight based adjustment of the mA/kV was utilized to reduce the radiation dose to as low as reasonably achievable. COMPARISON: None. HISTORY: ORDERING SYSTEM PROVIDED HISTORY: weakness TECHNOLOGIST PROVIDED HISTORY: Has a \"code stroke\" or \"stroke alert\" been called?->No Reason for exam:->weakness Decision Support Exception - unselect if not a suspected or confirmed emergency medical condition->Emergency Medical Condition (MA) What reading provider will be dictating this exam?->CRC FINDINGS: BRAIN/VENTRICLES: There is no acute intracranial hemorrhage, mass effect or midline shift.  No abnormal extra-axial fluid collection.  The gray-white differentiation is maintained without evidence of an acute infarct.  There is no evidence of hydrocephalus. ORBITS: The visualized portion of the orbits demonstrate no acute abnormality. SINUSES: The visualized paranasal sinuses and mastoid air cells demonstrate no acute abnormality. SOFT TISSUES/SKULL:  No acute abnormality of the visualized skull or soft tissues.     No acute intracranial abnormality. Normal CT appearance of the head.     XR CHEST

## 2024-04-29 NOTE — CARE COORDINATION
Advance Care Planning   The patient has the following advanced directives on file:  Advance Directives       Power of  Living Will ACP-Advance Directive ACP-Power of     Not on File Filed on 04/26/24 Filed Not on File            The patient has appointed the following active healthcare agents:    Primary Decision Maker: Dee Elias - Child - [phone]    The Patient has the following current code status:    Code Status: Limited

## 2024-04-29 NOTE — PLAN OF CARE
Problem: Discharge Planning  Goal: Discharge to home or other facility with appropriate resources  4/29/2024 1051 by Anais East, RN  Outcome: Progressing     Problem: Safety - Adult  Goal: Free from fall injury  4/29/2024 1051 by Anais East, RN  Outcome: Progressing     Problem: ABCDS Injury Assessment  Goal: Absence of physical injury  4/29/2024 1051 by Aanis East, RN  Outcome: Progressing     Problem: Skin/Tissue Integrity  Goal: Absence of new skin breakdown  Description: 1.  Monitor for areas of redness and/or skin breakdown  2.  Assess vascular access sites hourly  3.  Every 4-6 hours minimum:  Change oxygen saturation probe site  4.  Every 4-6 hours:  If on nasal continuous positive airway pressure, respiratory therapy assess nares and determine need for appliance change or resting period.  4/29/2024 1051 by Anais East, RN  Outcome: Progressing     Problem: Nutrition Deficit:  Goal: Optimize nutritional status  4/29/2024 1051 by Anais East, RN  Outcome: Progressing

## 2024-04-29 NOTE — FLOWSHEET NOTE
Inpatient Wound Care (Initial consult) 6512A    Admit Date: 4/26/2024  9:53 AM    Reason for consult:  Right buttock    Significant history: Per H&P    The patient is a 74 y.o. male presenting with complaint of worsening BLE weakness and fatigue. Recent hx of pancreatic Ca, RCC s/p resection with known persisting positive margins and microwave ablation. Is largely immobile at home where his daughter is his caretaker. Had been on hospice care after additional workup at [facility] unremarkable investigating his recent neurological sxs.     Findings:     04/29/24 1442   Skin Integumentary    Skin Integrity   (dry flaky)   Location bilateral feet   Skin Integrity Site 2   Skin Integrity Location 2 Ecchymosis   Location 2 BUE   Skin Integrity Site 3   Skin Integrity Location 3   (old healed area, redness blanchable)    Location 3 right buttock         Impression:  Skin assessment complete: see above documentation    Plan: Aquaphor  TAPS  Heel protectors  Patient will need continued preventative care      Rafaela Smith RN 4/29/2024 2:43 PM

## 2024-04-29 NOTE — PROGRESS NOTES
06:20 AM    RBC 4.14 04/29/2024 06:20 AM    HGB 11.5 04/29/2024 06:20 AM    HCT 35.9 04/29/2024 06:20 AM     04/29/2024 06:20 AM    MCV 86.7 04/29/2024 06:20 AM    MCH 27.8 04/29/2024 06:20 AM    MCHC 32.0 04/29/2024 06:20 AM    RDW 14.7 04/29/2024 06:20 AM    LYMPHOPCT 21 04/29/2024 06:20 AM    MONOPCT 10 04/29/2024 06:20 AM    BASOPCT 1 04/29/2024 06:20 AM    MONOSABS 0.70 04/29/2024 06:20 AM    LYMPHSABS 1.46 04/29/2024 06:20 AM    EOSABS 0.36 04/29/2024 06:20 AM    BASOSABS 0.08 04/29/2024 06:20 AM     BMP:    Lab Results   Component Value Date/Time     04/29/2024 06:20 AM    K 3.5 04/29/2024 06:20 AM     04/29/2024 06:20 AM    CO2 29 04/29/2024 06:20 AM    BUN 8 04/29/2024 06:20 AM    CREATININE 0.6 04/29/2024 06:20 AM    CALCIUM 10.2 04/29/2024 06:20 AM    LABGLOM >90 04/29/2024 06:20 AM    GLUCOSE 106 04/29/2024 06:20 AM    GLUCOSE 87 11/24/2010 11:00 AM     Magnesium:    Lab Results   Component Value Date/Time    MG 1.9 04/29/2024 06:20 AM     Phosphorus:    Lab Results   Component Value Date/Time    PHOS 3.2 04/29/2024 06:20 AM       Imaging Studies:    CTA PULMONARY W CONTRAST   Final Result   1. Prominent bilateral pulmonary embolism, right greater than left.  Clot   burden is high. There are findings of right ventricular strain.   2. Moderate consolidation of the posterior left lower lobe at the lung base,   probably atelectasis, but cannot exclude pneumonia.   3. Status post Whipple procedure.   4. 4.2 x 3.0 cm cystic region in the lateral portion of the residual   pancreatic head. This is probably a pseudocyst, although an abscess cannot be   entirely excluded.   5. Mucosal thickening extending from the collapsed ascending colon through   the descending colon which may be mild colitis.   6. Prominently enlarged prostate.         CT ABDOMEN PELVIS W IV CONTRAST Additional Contrast? None   Final Result   1. Prominent bilateral pulmonary embolism, right greater than left.  Clot   burden   04/29/2024 06:20 AM    K 3.5 04/29/2024 06:20 AM     04/29/2024 06:20 AM    CO2 29 04/29/2024 06:20 AM    BUN 8 04/29/2024 06:20 AM    CREATININE 0.6 04/29/2024 06:20 AM    GLUCOSE 106 04/29/2024 06:20 AM    GLUCOSE 87 11/24/2010 11:00 AM    CALCIUM 10.2 04/29/2024 06:20 AM       TSH:  No results found for: \"TSH\"      Imaging Studies:    RADIOLOGY:  My interpretation of the current and previous films reveal submassive PE acute     EKG:    My interpretation of the current and previous films rhythm strips and EKG reveals no findings of ischemia    Current issues are :  Cancer   Cancer related PE  Weakness and abnormal MRI    LMWH discussed for 6 months for his cancer related disease, NOAC and coumadin reviewed. Patient deciding.     I agree with the assessment, plan and orders as documented by the resident. I have reviewed all pertinent PMHx, PSHx, FamHx, SocialHx, medications, and allergies and updated history as appropriate.    Remainder of medical problems as per resident note.    Suman Mishra,   4/29/2024 4:04 PM

## 2024-04-29 NOTE — PLAN OF CARE
Problem: Discharge Planning  Goal: Discharge to home or other facility with appropriate resources  Outcome: Progressing     Problem: Safety - Adult  Goal: Free from fall injury  Outcome: Progressing     Problem: ABCDS Injury Assessment  Goal: Absence of physical injury  Outcome: Progressing     Problem: Skin/Tissue Integrity  Goal: Absence of new skin breakdown  Description: 1.  Monitor for areas of redness and/or skin breakdown  2.  Assess vascular access sites hourly  3.  Every 4-6 hours minimum:  Change oxygen saturation probe site  4.  Every 4-6 hours:  If on nasal continuous positive airway pressure, respiratory therapy assess nares and determine need for appliance change or resting period.  Outcome: Progressing     Problem: Nutrition Deficit:  Goal: Optimize nutritional status  Outcome: Progressing

## 2024-04-29 NOTE — PROGRESS NOTES
Palliative Care Department  [phone]  Palliative Care Progress Note  Provider Jennifer Gabriel, APRN - CNP     Luis Seo  [id number]  Hospital Day: 4  Date of Initial Consult: 4/27/2024  Referring Provider: Charleen Jamil MD   Palliative Medicine was consulted for assistance with: Goals of Care, End Stage Disease    HPI:   Luis Seo is a 74 y.o. with a medical history of pancreatic adenocarcinoma and renal cell carcinoma s/p resection with known persistent positive margins and microwave ablation who was admitted on 4/26/2024 from home with a CHIEF COMPLAINT of BLE weakness and fatigue.  Patient with bilateral PE. Patient was started on heparin drip and admitted to ICU.  Palliative medicine consulted for goals of care and end-stage disease.  ASSESSMENT/PLAN:     Pertinent Hospital Diagnoses     Bilateral PE  Hx pancreatic adenocarcinoma and renal cell carcinoma s/p resection and microwave ablation    Palliative Care Encounter / Counseling Regarding Goals of Care  Please see detailed goals of care discussion as below  At this time, Luis Seo, Does Not have capacity for medical decision-making.  Capacity is time limited and situation/question specific  During encounter, Dee, daughter, was surrogate medical decision-maker  Outcome of goals of care meeting:   Continue limited code and current management  Watch and wait progress  Goal is for patient to get strong enough to discharge to rehab  Code status Limited :no intubation, no compressions, no defibrillation, no resuscitative medications  Advanced Directives: POA and living will in [facility]  Surrogate/Legal NOK:  Dee Elias, daughter, [phone]    Spiritual assessment: no spiritual distress identified  Bereavement and grief: to be determined  Referrals to: none today  SUBJECTIVE:     Current medical issues leading to Palliative Medicine involvement include   Active Hospital Problems    Diagnosis Date Noted    Acute pulmonary embolism,

## 2024-04-29 NOTE — PROGRESS NOTES
Visited the patient and one of his child a lady was at his bed side. Stated that he is doing better since Friday he was admitted. Indicated that weekend is always slow but hope to see the doctors to know the next steps that will be taken. Hope everything goes well. They were appreciative of the visit and I assured them of our support.    . CHRISTIANO.PH, B.TH, [facility] [phone].

## 2024-04-29 NOTE — PROGRESS NOTES
Occupational Therapy  OT SESSION ATTEMPT     Date:2024  Patient Name: Luis Seo  MRN: [id number]  : 1950  Room: [address] [place] [address]-A     Attempted OT session this date:    [x] unavailable due to other medical staff currently with pt   [] on hold per nursing staff   [] on hold per nursing staff secondary to lab / radiology results    [] declined Occupational Therapy  this date due to ___.  Benefits of participation in therapy reviewed with pt.    [] off unit   [] Other:     Will reattempt OT evaluation at a later time.    Trey Brantley OTR/L #[id number]

## 2024-04-30 ENCOUNTER — APPOINTMENT (OUTPATIENT)
Dept: MRI IMAGING | Age: 74
DRG: 175 | End: 2024-04-30
Payer: MEDICARE

## 2024-04-30 LAB
ANION GAP SERPL CALCULATED.3IONS-SCNC: 12 MMOL/L (ref 7–16)
AQP4 H2O CHANNEL IGG SERPL IA-ACNC: <1.5 U/ML
BASOPHILS # BLD: 0.1 K/UL (ref 0–0.2)
BASOPHILS NFR BLD: 1 % (ref 0–2)
BUN SERPL-MCNC: 7 MG/DL (ref 6–23)
CALCIUM SERPL-MCNC: 10.1 MG/DL (ref 8.6–10.2)
CHLORIDE SERPL-SCNC: 99 MMOL/L (ref 98–107)
CO2 SERPL-SCNC: 25 MMOL/L (ref 22–29)
CREAT SERPL-MCNC: 0.5 MG/DL (ref 0.7–1.2)
EOSINOPHIL # BLD: 0.43 K/UL (ref 0.05–0.5)
EOSINOPHILS RELATIVE PERCENT: 5 % (ref 0–6)
ERYTHROCYTE [DISTWIDTH] IN BLOOD BY AUTOMATED COUNT: 14.7 % (ref 11.5–15)
GFR SERPL CREATININE-BSD FRML MDRD: >90 ML/MIN/1.73M2
GLUCOSE BLD-MCNC: 179 MG/DL (ref 74–99)
GLUCOSE BLD-MCNC: 99 MG/DL (ref 74–99)
GLUCOSE SERPL-MCNC: 104 MG/DL (ref 74–99)
HCT VFR BLD AUTO: 39.5 % (ref 37–54)
HCT VFR BLD AUTO: 39.6 % (ref 37–54)
HGB BLD-MCNC: 12.3 G/DL (ref 12.5–16.5)
HGB BLD-MCNC: 12.4 G/DL (ref 12.5–16.5)
IMM GRANULOCYTES # BLD AUTO: 0.04 K/UL (ref 0–0.58)
IMM GRANULOCYTES NFR BLD: 1 % (ref 0–5)
INR PPP: 2.1
LYMPHOCYTES NFR BLD: 2.77 K/UL (ref 1.5–4)
LYMPHOCYTES RELATIVE PERCENT: 31 % (ref 20–42)
Lab: NEGATIVE
MAGNESIUM SERPL-MCNC: 1.8 MG/DL (ref 1.6–2.6)
MCH RBC QN AUTO: 27.8 PG (ref 26–35)
MCHC RBC AUTO-ENTMCNC: 31.1 G/DL (ref 32–34.5)
MCV RBC AUTO: 89.4 FL (ref 80–99.9)
MONOCYTES NFR BLD: 0.76 K/UL (ref 0.1–0.95)
MONOCYTES NFR BLD: 9 % (ref 2–12)
NEUTROPHILS NFR BLD: 54 % (ref 43–80)
NEUTS SEG NFR BLD: 4.74 K/UL (ref 1.8–7.3)
PARTIAL THROMBOPLASTIN TIME: 30.8 SEC (ref 24.5–35.1)
PARTIAL THROMBOPLASTIN TIME: 80.1 SEC (ref 24.5–35.1)
PHOSPHATE SERPL-MCNC: 3 MG/DL (ref 2.5–4.5)
PLATELET # BLD AUTO: 329 K/UL (ref 130–450)
PMV BLD AUTO: 10.9 FL (ref 7–12)
POTASSIUM SERPL-SCNC: 3.6 MMOL/L (ref 3.5–5)
PROTHROMBIN TIME: 22.4 SEC (ref 9.3–12.4)
RBC # BLD AUTO: 4.43 M/UL (ref 3.8–5.8)
SODIUM SERPL-SCNC: 136 MMOL/L (ref 132–146)
WBC OTHER # BLD: 8.8 K/UL (ref 4.5–11.5)

## 2024-04-30 PROCEDURE — 85018 HEMOGLOBIN: CPT

## 2024-04-30 PROCEDURE — 97535 SELF CARE MNGMENT TRAINING: CPT

## 2024-04-30 PROCEDURE — 70551 MRI BRAIN STEM W/O DYE: CPT

## 2024-04-30 PROCEDURE — 97530 THERAPEUTIC ACTIVITIES: CPT

## 2024-04-30 PROCEDURE — 2700000000 HC OXYGEN THERAPY PER DAY

## 2024-04-30 PROCEDURE — 97165 OT EVAL LOW COMPLEX 30 MIN: CPT

## 2024-04-30 PROCEDURE — 2580000003 HC RX 258

## 2024-04-30 PROCEDURE — 6360000002 HC RX W HCPCS

## 2024-04-30 PROCEDURE — 72148 MRI LUMBAR SPINE W/O DYE: CPT

## 2024-04-30 PROCEDURE — 84100 ASSAY OF PHOSPHORUS: CPT

## 2024-04-30 PROCEDURE — 82962 GLUCOSE BLOOD TEST: CPT

## 2024-04-30 PROCEDURE — 72146 MRI CHEST SPINE W/O DYE: CPT

## 2024-04-30 PROCEDURE — 36415 COLL VENOUS BLD VENIPUNCTURE: CPT

## 2024-04-30 PROCEDURE — 85025 COMPLETE CBC W/AUTO DIFF WBC: CPT

## 2024-04-30 PROCEDURE — 6360000002 HC RX W HCPCS: Performed by: STUDENT IN AN ORGANIZED HEALTH CARE EDUCATION/TRAINING PROGRAM

## 2024-04-30 PROCEDURE — 1200000000 HC SEMI PRIVATE

## 2024-04-30 PROCEDURE — 6370000000 HC RX 637 (ALT 250 FOR IP)

## 2024-04-30 PROCEDURE — 97161 PT EVAL LOW COMPLEX 20 MIN: CPT

## 2024-04-30 PROCEDURE — 72141 MRI NECK SPINE W/O DYE: CPT

## 2024-04-30 PROCEDURE — 85730 THROMBOPLASTIN TIME PARTIAL: CPT

## 2024-04-30 PROCEDURE — 85610 PROTHROMBIN TIME: CPT

## 2024-04-30 PROCEDURE — 99232 SBSQ HOSP IP/OBS MODERATE 35: CPT | Performed by: INTERNAL MEDICINE

## 2024-04-30 PROCEDURE — 99232 SBSQ HOSP IP/OBS MODERATE 35: CPT | Performed by: NURSE PRACTITIONER

## 2024-04-30 PROCEDURE — 92610 EVALUATE SWALLOWING FUNCTION: CPT | Performed by: SPEECH-LANGUAGE PATHOLOGIST

## 2024-04-30 PROCEDURE — 92526 ORAL FUNCTION THERAPY: CPT | Performed by: SPEECH-LANGUAGE PATHOLOGIST

## 2024-04-30 PROCEDURE — 83735 ASSAY OF MAGNESIUM: CPT

## 2024-04-30 PROCEDURE — 85014 HEMATOCRIT: CPT

## 2024-04-30 PROCEDURE — 80048 BASIC METABOLIC PNL TOTAL CA: CPT

## 2024-04-30 RX ORDER — WARFARIN SODIUM 2.5 MG/1
2.5 TABLET ORAL
Status: DISCONTINUED | OUTPATIENT
Start: 2024-04-30 | End: 2024-04-30

## 2024-04-30 RX ORDER — POTASSIUM CHLORIDE 20 MEQ/1
40 TABLET, EXTENDED RELEASE ORAL ONCE
Status: COMPLETED | OUTPATIENT
Start: 2024-04-30 | End: 2024-04-30

## 2024-04-30 RX ORDER — ENOXAPARIN SODIUM 100 MG/ML
1 INJECTION SUBCUTANEOUS 2 TIMES DAILY
Status: DISCONTINUED | OUTPATIENT
Start: 2024-04-30 | End: 2024-05-06 | Stop reason: HOSPADM

## 2024-04-30 RX ORDER — LORAZEPAM 1 MG/1
1 TABLET ORAL
Status: COMPLETED | OUTPATIENT
Start: 2024-04-30 | End: 2024-04-30

## 2024-04-30 RX ORDER — MAGNESIUM SULFATE IN WATER 40 MG/ML
2000 INJECTION, SOLUTION INTRAVENOUS ONCE
Status: COMPLETED | OUTPATIENT
Start: 2024-04-30 | End: 2024-04-30

## 2024-04-30 RX ADMIN — SODIUM CHLORIDE, PRESERVATIVE FREE 10 ML: 5 INJECTION INTRAVENOUS at 21:41

## 2024-04-30 RX ADMIN — PANTOPRAZOLE SODIUM 40 MG: 40 TABLET, DELAYED RELEASE ORAL at 06:02

## 2024-04-30 RX ADMIN — PETROLATUM: 420 OINTMENT TOPICAL at 21:42

## 2024-04-30 RX ADMIN — ENOXAPARIN SODIUM 80 MG: 100 INJECTION SUBCUTANEOUS at 22:19

## 2024-04-30 RX ADMIN — MAGNESIUM SULFATE HEPTAHYDRATE 2000 MG: 40 INJECTION, SOLUTION INTRAVENOUS at 06:07

## 2024-04-30 RX ADMIN — QUETIAPINE FUMARATE 50 MG: 25 TABLET ORAL at 21:40

## 2024-04-30 RX ADMIN — HEPARIN SODIUM 4400 UNITS: 1000 INJECTION INTRAVENOUS; SUBCUTANEOUS at 10:26

## 2024-04-30 RX ADMIN — SODIUM CHLORIDE, PRESERVATIVE FREE 10 ML: 5 INJECTION INTRAVENOUS at 10:13

## 2024-04-30 RX ADMIN — TAMSULOSIN HYDROCHLORIDE 0.4 MG: 0.4 CAPSULE ORAL at 10:13

## 2024-04-30 RX ADMIN — LORAZEPAM 1 MG: 1 TABLET ORAL at 17:01

## 2024-04-30 RX ADMIN — PETROLATUM: 420 OINTMENT TOPICAL at 10:14

## 2024-04-30 RX ADMIN — POTASSIUM CHLORIDE 40 MEQ: 1500 TABLET, EXTENDED RELEASE ORAL at 06:02

## 2024-04-30 NOTE — PROGRESS NOTES
Blood and Cancer [place]  Hematology/Oncology  Consult      Patient Name: Luis Seo  YOB: 1950  PCP: Brad Bassett MD   Referring Provider:      Reason for Consultation:   Chief Complaint   Patient presents with    Fatigue     Bilateral leg weakness, fatigue, Pancreatic CA had lrg portion removed 3 weeks ago. Denies pain, 87% on RA denies SOB        Subjective:  Back pain/spasms. Otherwise feels fairly well    History of Present Illness: This is a 74-year-male patient pancreatic adenocarcinoma and renal cell carcinoma with recent pancreatectomy and microwave ablation at [facility] who presents with concern of fatigue.  He had pancreatectomy and ablation done mid March and upon returning home 2 days later had profound weakness in bilateral upper and lower extremities.  His lower extremities eventually began having hyperesthesia.  He regained function of his upper extremities after about a month.  His bilateral lower extremities continue to be weak.  He also had diplopia at that time.  Extensive workup at [facility] revealed inflammation on MRI with extensive enhancement of the leptomeninges but serologic workup and lumbar puncture were unrevealing for cause.  He was started on steroids at 1 point.  Also consideration for GBS and IVIG at that time however patient decided not to start the treatment due to concerns for renal failure. It was thought that with his positive margins the chance of recurrence was exceptionally high. It was explained that for most patients recurrence occurs within 6 to 18 months of surgery. Median survival at the time of recurrence is often 3 to 6 months. Adjuvant chemotherapy after surgical resection and radiation for positive margins is usually considered, however it was felt that patient would not be able to tolerate these treatments secondary to his neurological debilitation. He was with comfort care. In the ED a CTA pulmonary showed bilateral pulmonary emboli with

## 2024-04-30 NOTE — PLAN OF CARE
Problem: Discharge Planning  Goal: Discharge to home or other facility with appropriate resources  4/29/2024 2234 by Cheryl Varela RN  Outcome: Progressing  4/29/2024 1051 by Anais East RN  Outcome: Progressing     Problem: Safety - Adult  Goal: Free from fall injury  4/29/2024 2234 by Cheryl Varela RN  Outcome: Progressing  4/29/2024 1051 by Anais Esat RN  Outcome: Progressing     Problem: ABCDS Injury Assessment  Goal: Absence of physical injury  4/29/2024 2234 by Cheryl Varela RN  Outcome: Progressing  4/29/2024 1051 by Anais East RN  Outcome: Progressing     Problem: Skin/Tissue Integrity  Goal: Absence of new skin breakdown  Description: 1.  Monitor for areas of redness and/or skin breakdown  2.  Assess vascular access sites hourly  3.  Every 4-6 hours minimum:  Change oxygen saturation probe site  4.  Every 4-6 hours:  If on nasal continuous positive airway pressure, respiratory therapy assess nares and determine need for appliance change or resting period.  4/29/2024 2234 by Cheryl Varela RN  Outcome: Progressing  4/29/2024 1051 by Anais East, RN  Outcome: Progressing     Problem: Nutrition Deficit:  Goal: Optimize nutritional status  4/29/2024 1051 by Anais East, RN  Outcome: Progressing

## 2024-04-30 NOTE — PROGRESS NOTES
Message send via perfect serve to Dr. Cassidy regarding advancing patient's diet, awaiting response.

## 2024-04-30 NOTE — PROGRESS NOTES
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     Weight:   Wt Readings from Last 1 Encounters:   04/30/24 84.8 kg (186 lb 14.4 oz)       TSH:  No results found for: \"TSH\"    Hepatic Function Panel:                            Lab Results   Component Value Date/Time    ALKPHOS 100 04/26/2024 11:34 AM    ALT 16 04/26/2024 11:34 AM    AST 35 04/26/2024 11:34 AM    PROT 5.8 04/26/2024 11:34 AM    BILITOT 0.7 04/26/2024 11:34 AM       Current significant warfarin drug-drug interactions include: No significant interactions    Recent Labs     04/28/24  0540 04/29/24  0620 04/30/24  0312   HGB 11.4* 11.5* 12.3*    272 329       Date Warfarin Dose INR Heparin or LMWH Comment   4/27 2.5 mg 1.5 UFH infusion    4/28 2.5 mg 1.4 UFH infusion    4/29 2.5 mg 1.5 UFH infusion    4/30 < 2.5 mg > 2.1 UFH infusion             Assessment:  Patient is a 74 y.o. male new start on warfarin for New Pulmonary Embolism. Patient is currently on heparin gtt, plan is to bridge with warfarin starting today.   PMH: pancreatic adenoCA and RCC.  S/P Pancreatectomy/splenectomy Mar 2024.  Goal INR 2 - 3  4/29: INR = 1.5; day #3 overlap UFH-warfarin.  4/30: INR = 2.1; day #4 UFH-warfarin overlap.  Per IM team, may switch to LMWH for PE treatment.    Plan:  Give warfarin 2.5 mg x1 tonight.  Daily PT/INR until the INR is stable within the therapeutic range.  Pharmacist will follow and monitor/adjust dosing as necessary.    Zach Rojas, PharmD  4/30/2024  9:50 AM  [phone]

## 2024-04-30 NOTE — CARE COORDINATION
Transition of care update:Bilateral PE.  IV heparin infusion. INR 2.1 and other labs noted. Coumadin 2.5mg today.  Asked for pt/ot to work with pt.     1030  Spoke with PT and pt is good candidate for aru.   Per my conversation with pt's daughter, Litzy,  yesterday, she would like aru here at [facility].     1100  Spoke with Doris with [facility]. Ph#[phone]. Doris said they are reaching out to pt's daughter, Dee, to have her sign the papers for revoking hospice benefits and they will discuss with Dee about the equipment that is in pt's home from when he was under hospice care. Dee said they provided a hospital bed, bill lift and supplemental oxygen.     1125  Pt transferred to [facility]. PMR consult on chart.   Called Dee ph#[phone] and updated her on above. Plan remains to rehab at discharge. Await PMR consult input. She has a shireen list to make choices just in case it is needed. Cm/sw will follow.

## 2024-04-30 NOTE — PROGRESS NOTES
Clinical Pharmacy Note    Warfarin was stopped today by Dr. Jamil.  Enoxaparin 80 mg SC bid was started for PE treatment.    Clinical Pharmacy sign off.     Zach Rojas, PharmD  4/30/2024  3:45 PM  [phone]

## 2024-04-30 NOTE — PATIENT CARE CONFERENCE
P Quality Flow/Interdisciplinary Rounds Progress Note        Quality Flow Rounds held on April 30, 2024    Disciplines Attending:  Bedside Nurse, , , and Nursing Unit Leadership    Luis Seo was admitted on 4/26/2024  9:53 AM    Anticipated Discharge Date:       Disposition:    Eyad Score:  Eyad Scale Score: 16    Readmission Risk              Risk of Unplanned Readmission:  14           Discussed patient goal for the day, patient clinical progression, and barriers to discharge.  The following Goal(s) of the Day/Commitment(s) have been identified:  downgrade/discharge planning       Kirstie Lee RN  April 30, 2024

## 2024-04-30 NOTE — PROGRESS NOTES
Messaged Dr. Jacobs with Team 1, regarding patient requesting that the Fentanyl patch be discontinued. Awaiting response.

## 2024-04-30 NOTE — PROGRESS NOTES
[facility] Neurology follow up      Luis Seo is a 74 y.o. right handed male     Neurology following for hx of GBS, extremity weakness and fatigue     PMH of pancreatic cancer, renal cell cancer, atrial fibrillation, AAA, hypertension, hyperlipidemia and BPH     Presenting for evaluation of extremity weakness and fatigue. Symptoms at the present since over a month ago, 10 days after laparoscopic abdominal surgery for pancreatic adenocarcinoma with resection and ablation of both pancreatic and renal masses on 3/8/2024 at [facility]. Initially had both upper and lower extremity weakness with associated paresthesia and numbness involving both upper and lower extremities. There was also associated diplopia, dysphagia and dysarthria.  He had extensive evaluation at [facility] including MRI which showed extensive enhancement of the leptomeninges and CSF analysis showing elevated CSF proteins of 163 with glucose of 117, 2 WBCs, 30 RBCs and negative paraneoplastic markers.  He had a 5-day course of high-dose steroids, declined IVIG and was discharged to home hospice care and pain management.     He was brought to the ED for further evaluation of persistent fatigue and leg weakness.  He was noted to be hypoxic and CTA was positive for bilateral PE with right heart strain and significant clot burden and patient was admitted to the intensive care unit for observation.  He was transferred yesterday to the floors on heparin being transitioned to warfarin.  Neurology was consulted for further evaluation of persistent bilateral lower extremity weakness.    MRI of brain and spine were ordered w/wo    Pt lying in bed with wife at bedside.  No questions at this time.  Discussed MRI's to be completed.  They are awaiting those results. Pt noted he walked with PT this morning in the [place]ffries.      Objective:     /72   Pulse 84   Temp 97.7 °F (36.5 °C) (Temporal)   Resp 16   Ht 1.778 m (5' 10\")   Wt 84.8 kg (186 lb 14.4 oz)   SpO2  97%   BMI 26.82 kg/m²     General appearance: alert, appears stated age, cooperative and no distress  Head: normocephalic, without obvious abnormality, atraumatic  Eyes: conjunctivae/corneas clear  Neck: supple, symmetrical, trachea midline   Lungs: respirations non labored   Extremities:  normal, atraumatic, no cyanosis or edema  Skin: color, texture, turgor normal      Mental Status: Alert, oriented, thought content appropriate     Appropriate attention/concentration  Intact fundus of knowledge  Repetition intact  Intact memories      Speech: no dysarthria  Language: no aphasias     Cranial Nerves:  I: smell NA   II: visual acuity  NA   II: visual fields Full to confrontation   II: pupils JOSE   III,VII: ptosis None   III,IV,VI: extraocular muscles  Full ROM   V: mastication Normal   V: facial light touch sensation  Normal   V,VII: corneal reflex     VII: facial muscle function - upper  Normal   VII: facial muscle function - lower Normal   VIII: hearing Normal   IX: soft palate elevation  Normal   IX,X: gag reflex    XI: trapezius strength  5/5   XI: sternocleidomastoid strength 5/5   XI: neck extension strength  5/5   XII: tongue strength  Normal     Motor:  4/5 strength BUE, 3/5 BLE strength   Normal bulk and tone  No abnormal movements    Sensory:  LT  normal    Coordination:   FN, FFM and ROSELINE normal    Gait:  Deferred     DTR:   1+ throughout and BE in BLE     No Babinski's    Laboratory/Radiology:     CBC with Differential:    Lab Results   Component Value Date/Time    WBC 8.8 04/30/2024 03:12 AM    RBC 4.43 04/30/2024 03:12 AM    HGB 12.3 04/30/2024 03:12 AM    HCT 39.6 04/30/2024 03:12 AM     04/30/2024 03:12 AM    MCV 89.4 04/30/2024 03:12 AM    MCH 27.8 04/30/2024 03:12 AM    MCHC 31.1 04/30/2024 03:12 AM    RDW 14.7 04/30/2024 03:12 AM    LYMPHOPCT 31 04/30/2024 03:12 AM    MONOPCT 9 04/30/2024 03:12 AM    BASOPCT 1 04/30/2024 03:12 AM    MONOSABS 0.76 04/30/2024 03:12 AM    LYMPHSABS 2.77

## 2024-04-30 NOTE — PROGRESS NOTES
SPEECH/LANGUAGE PATHOLOGY  CLINICAL ASSESSMENT OF SWALLOWING FUNCTION   and PLAN OF CARE    PATIENT NAME:  Luis Seo  (male)     MRN:  [id number]    :  1950  (74 y.o.)  STATUS:  Inpatient: Room 5209/5209-A    TODAY'S DATE:  24 1330    SLP swallowing-dysphagia evaluation and treatment  Start:  24 1330,   End:  24 1330,   ONE TIME,   Standing Count:  1 Occurrences,   R       Nina Cassidy MD  REASON FOR REFERRAL:     dysphagia      EVALUATING THERAPIST: DEMARCO Hall                 RESULTS:    DYSPHAGIA DIAGNOSIS:   Clinical indicators of normal swallow function     Pt and daughter deny dysphagia symptoms at home; report poor intake only secondary to appetite however not related to swallow function. This SLP reviewed [facility] SLP notes from admission in March- no MBSS completed, and no significant dysphagia at that time, as all evaluations completed resulted in recommendation for regular diet/ thin liquids with strategies for reduced rate of intake and small bolus sizes.     Pt does report occasional globus sensation.       DIET RECOMMENDATIONS:  Minced and moist consistency solids (IDDSI level 5) with  thin liquids (IDDSI level 0)    -- with ongoing SLP assessment to upgrade to solid foods once fatigue is determined to not be an issue. Pt and daughter agreeable, did not feel jumping to a regular diet was warranted today as he had been on a liquid diet due to poor appetite for the past week. Both parties agreeable to modified diet and ongoing SLP intervention.        FEEDING RECOMMENDATIONS:     Assistance level:  Supervision is needed during all oral intake      Compensatory strategies recommended: Thorough oral care to prevent colonization of oral bacteria , Upright in bed/ chair as tolerated, Small bites/sips, and Alternate solids and liquids      Discussed recommendations with:  charge nurse in person    SPEECH THERAPY  PLAN OF CARE   The dysphagia POC is  Regular consistency solids (IDDSI level 7) with  thin liquids (IDDSI level 0)  Current Diet Order:  ADULT DIET; Clear Liquid  ADULT ORAL NUTRITION SUPPLEMENT; Breakfast, Lunch, Dinner; Clear Liquid Oral Supplement  ADULT ORAL NUTRITION SUPPLEMENT; Breakfast, Lunch, Dinner; Fortified Gelatin Oral Supplement    PROCEDURE:  Consistencies Administered During the Evaluation   Liquids: thin liquid   Solids:  Pureed  and Soft and bite sized  (1/2 ricco cracker and 1/2 applesauce)     Method of Intake:   cup, straw, spoon  Self fed      Position:   Sitting upright in a chair    CLINICAL ASSESSMENT:  Oral Stage:       The oral stage of swallowing was within functional limits for consistencies administered      Pharyngeal Stage:    No signs of aspiration were noted during this evaluation however, silent aspiration cannot be ruled out at bedside.  If silent aspiration is suspected, a Videofluoroscopic Study of Swallowing (MBS) is recommended and requires a physician order.    Cognition:   Within functional limits for this exam    Oral Peripheral Examination   Adequate lingual/labial strength     Current Respiratory Status    room air     Parameters of Speech Production  Respiration:  Adequate for speech production  Quality:   Hoarse  Intensity: Within functional limits    Volitional Swallow: Present     Volitional Cough:  Present     Pain: No pain reported.    EDUCATION:   The Speech Language Pathologist (SLP) completed education regarding results of evaluation and that intervention is warranted at this time.  Learner: Patient and Family  Education: Reviewed results and recommendations of this evaluation, Reviewed diet and strategies, Reviewed signs, symptoms and risks of aspiration, Reviewed recommendations for follow-up, and Education Related to Potential Risks and Complications Due to Impairment/Illness/Injury  Evaluation of Education:  Verbalizes understanding    This plan may be re-evaluated and revised as warranted.

## 2024-04-30 NOTE — PROGRESS NOTES
4 Eyes Skin Assessment     NAME:  Luis Seo  YOB: 1950  MEDICAL RECORD NUMBER:  [id number]    The patient is being assessed for  Transfer to New Unit    I agree that at least one RN has performed a thorough Head to Toe Skin Assessment on the patient. ALL assessment sites listed below have been assessed.      Areas assessed by both nurses:    Head, Face, Ears, Shoulders, Back, Chest, Arms, Elbows, Hands, Sacrum. Buttock, Coccyx, Ischium, and Legs. Feet and Heels  Buttocks- mid- dry, non-blanchable, redness        Does the Patient have a Wound? No noted wound(s)       Eyad Prevention initiated by RN: Yes  Wound Care Orders initiated by RN: No    Pressure Injury (Stage 3,4, Unstageable, DTI, NWPT, and Complex wounds) if present, place Wound referral order by RN under : No    New Ostomies, if present place, Ostomy referral order under : No     Nurse 1 eSignature: Electronically signed by Jacqueline Tripathi RN on 4/30/24 at 6:41 PM EDT    **SHARE this note so that the co-signing nurse can place an eSignature**    Nurse 2 eSignature: {Esignature:[id number]}

## 2024-04-30 NOTE — PROGRESS NOTES
Physical Therapy  Physical Therapy Initial Assessment    Name: Luis Seo  : 1950  MRN: [id number]      Date of Service: 2024    Evaluating PT:  Brad Skelton, PT, DPT [id number]    Room #:  5209/5209-A  Diagnosis:  Acute pulmonary embolism, unspecified pulmonary embolism type, unspecified whether acute cor pulmonale present (HCC) [I26.99]  PMHx/PSHx:   has no past medical history on file.   has no past surgical history on file.  Procedure/Surgery:  None  Precautions:  Falls, O2  Equipment Owned:  FWW, cane, wheelchair  Equipment Needs:  TBD    SUBJECTIVE:    Pt lives alone in a 1 story home with 0 stair(s) to enter.  Family has been staying with patient .  Pt stated that he last ambulated ~1 month ago;  has been staying in bed;  sat up on EOB \"once in a while.\"    OBJECTIVE:   Initial Evaluation  Date: 2024 Treatment Short Term/ Long Term   Goals   AM-PAC 6 Clicks      Was pt agreeable to Eval/treatment? Yes     Does pt have pain? No     Bed Mobility  Rolling: NT  Supine to sit: ModA  Sit to supine: ModA  Scooting: Karuna (seated)  Rolling: Independent  Supine to sit: Independent  Sit to supine: Independent  Scooting: Independent   Transfers Sit to stand: ModA  Stand to sit: ModA  Stand pivot: NT  Sit to stand: Mod I  Stand to sit: Mod I  Stand pivot: Mod I with AAD   Ambulation    3 feet (side steps) with WW ModA    15 feet with WW Karuna  100 feet with AAD Mod I   Stair negotiation: ascended and descended  NT  TBD   ROM BUE:  See OT note  BLE:  WFL     Strength BUE:  See OT note  BLE:  Grossly 5/5     Balance Sitting EOB:  Supervision  Dynamic Standing:  ModA <> Karuna with WW  Sitting EOB:  Independent  Dynamic Standing:  Mod I with AAD     Sensation:  Pt denies numbness/tingling to extremities  Edema:  Unremarkable    Vitals (2 L O2):   -140, SpO2 96-98% with activity.    Patient education  Pt educated on role of PT, safety during functional mobility, use of call light for

## 2024-04-30 NOTE — PROGRESS NOTES
Updated Debra Canales regarding patient's black stool this shift. N.O.received discontinue Heparin drip

## 2024-04-30 NOTE — PROCEDURES
Pt was brought down for MRI brain, cervical, thoracic and lumbar spine, an exam that would take around 2 hours to do Pt was unable to finish exam due to pain, only non-contrast images were acquired.  Images were sent to CRC to be read.

## 2024-04-30 NOTE — PROGRESS NOTES
OCCUPATIONAL THERAPY INITIAL EVALUATION    [facility]  [address], [place]        Date:2024                                                  Patient Name: Luis Seo    MRN: [id number]    : 1950    Room: [address] [place] [address]        Evaluating OT: Leslie Hasnen OTR/L; [id number]       Referring Provider: Anais Kennedy MD     Specific Provider Orders/Date: OT Eval and Treat 24       Diagnosis: Acute pulmonary embolism, unspecified pulmonary embolism type, unspecified whether acute cor pulmonale present      Surgery: None this admission     Pertinent Medical History:  has no past medical history on file.     Recommended Adaptive Equipment: TBD     Precautions:  Fall Risk, h/o pancreatic CA, clear liquid diet     Assessment of current deficits    [x] Functional mobility  [x]ADLs  [x] Strength               []Cognition    [x] Functional transfers   [x] IADLs         [x] Safety Awareness   [x]Endurance    [x] Fine Coordination              [x] Balance      [] Vision/perception   []Sensation     []Gross Motor Coordination  [] ROM  [] Delirium                   [] Motor Control     OT PLAN OF CARE   OT POC based on physician orders, patient diagnosis and results of clinical assessment    Frequency/Duration 1-3 days/wk for 2 weeks PRN   Specific OT Treatment Interventions to include:   * Instruction/training on adapted ADL techniques and AE recommendations to increase functional independence within precautions       * Training on energy conservation strategies, correct breathing pattern and techniques to improve independence/tolerance for self-care routine  * Functional transfer/mobility training/DME recommendations for increased independence, safety, and fall prevention  * Patient/Family education to increase follow through with safety techniques and functional independence  * Recommendation of environmental modifications for increased

## 2024-04-30 NOTE — PROGRESS NOTES
[facility]  Internal Medicine Residency Program  Progress Note - House Team       Patient:  Luis Seo 74 y.o. male   MRN: [id number]       Date of Service: 4/30/2024    CC: Fatigue and Weakness   Overnight events: STEPHANIE     Subjective     Patient seen and examined at bedside this a.m. Was able to sit to the edge of the bed but did not get up. BLE weakness has improved but not at baseline. Working with PT this morning. Goal is to discharge to ARU. Back spasms from yesterday have not reoccurred overnight. Denies F/C/N/V/CP/SOB/Abd pain.     Objective       Physical Exam  Vitals: /77   Pulse 92   Temp 97.6 °F (36.4 °C) (Temporal)   Resp 16   Ht 1.778 m (5' 10\")   Wt 84.8 kg (186 lb 14.4 oz)   SpO2 94%   BMI 26.82 kg/m²     I & O - 24hr: I/O this shift:  In: 360 [P.O.:360]  Out: 250 [Urine:250]   General Appearance: alert, appears stated age, and cooperative.  On 2 L NC O2  HEENT:  Head: Normal, normocephalic, atraumatic.  Neck: no carotid bruit and supple, symmetrical, trachea midline  Lung: clear to auscultation bilaterally  Heart: regular rate and rhythm and systolic murmur: systolic ejection 2/6, 5th intercostal space mid clavicular line     Abdomen: soft, non-tender; bowel sounds normal; no masses,  no organomegaly  Extremities:  extremities normal, atraumatic, no cyanosis or edema  Musculokeletal: 4/5 Strength in BUE, 4/5 Strength in RLE, 3-4/5 Strength in LLE. Sensation intact. No spinous process step off deformities or pain with palpation. No joint swelling, no muscle tenderness.   Neurologic: Mental status: Alert, oriented, thought content appropriate    Diet:   ADULT ORAL NUTRITION SUPPLEMENT; Breakfast, Lunch, Dinner; Clear Liquid Oral Supplement  ADULT ORAL NUTRITION SUPPLEMENT; Breakfast, Lunch, Dinner; Fortified Gelatin Oral Supplement  ADULT DIET; Dysphagia - Minced and Moist      Pertinent Labs & Imaging Studies     Labs    CBC with Differential:    Lab Results

## 2024-05-01 ENCOUNTER — APPOINTMENT (OUTPATIENT)
Dept: MRI IMAGING | Age: 74
DRG: 175 | End: 2024-05-01
Payer: MEDICARE

## 2024-05-01 LAB
ANION GAP SERPL CALCULATED.3IONS-SCNC: 13 MMOL/L (ref 7–16)
BASOPHILS # BLD: 0.09 K/UL (ref 0–0.2)
BASOPHILS NFR BLD: 1 % (ref 0–2)
BUN SERPL-MCNC: 7 MG/DL (ref 6–23)
CALCIUM SERPL-MCNC: 10.2 MG/DL (ref 8.6–10.2)
CHLORIDE SERPL-SCNC: 101 MMOL/L (ref 98–107)
CO2 SERPL-SCNC: 27 MMOL/L (ref 22–29)
CREAT SERPL-MCNC: 0.6 MG/DL (ref 0.7–1.2)
EOSINOPHIL # BLD: 0.42 K/UL (ref 0.05–0.5)
EOSINOPHILS RELATIVE PERCENT: 7 % (ref 0–6)
ERYTHROCYTE [DISTWIDTH] IN BLOOD BY AUTOMATED COUNT: 14.9 % (ref 11.5–15)
GFR, ESTIMATED: >90 ML/MIN/1.73M2
GLUCOSE BLD-MCNC: 103 MG/DL (ref 74–99)
GLUCOSE BLD-MCNC: 132 MG/DL (ref 74–99)
GLUCOSE BLD-MCNC: 138 MG/DL (ref 74–99)
GLUCOSE SERPL-MCNC: 105 MG/DL (ref 74–99)
HBA1C MFR BLD: 7.3 % (ref 4–5.6)
HCT VFR BLD AUTO: 36.9 % (ref 37–54)
HGB BLD-MCNC: 11.6 G/DL (ref 12.5–16.5)
IMM GRANULOCYTES # BLD AUTO: 0.04 K/UL (ref 0–0.58)
IMM GRANULOCYTES NFR BLD: 1 % (ref 0–5)
INR PPP: 1.9
LYMPHOCYTES NFR BLD: 1.92 K/UL (ref 1.5–4)
LYMPHOCYTES RELATIVE PERCENT: 30 % (ref 20–42)
MAGNESIUM SERPL-MCNC: 2.1 MG/DL (ref 1.6–2.6)
MCH RBC QN AUTO: 27.4 PG (ref 26–35)
MCHC RBC AUTO-ENTMCNC: 31.4 G/DL (ref 32–34.5)
MCV RBC AUTO: 87 FL (ref 80–99.9)
MICROORGANISM SPEC CULT: NORMAL
MICROORGANISM SPEC CULT: NORMAL
MONOCYTES NFR BLD: 0.78 K/UL (ref 0.1–0.95)
MONOCYTES NFR BLD: 12 % (ref 2–12)
NEUTROPHILS NFR BLD: 49 % (ref 43–80)
NEUTS SEG NFR BLD: 3.1 K/UL (ref 1.8–7.3)
PHOSPHATE SERPL-MCNC: 3.6 MG/DL (ref 2.5–4.5)
PLATELET # BLD AUTO: 344 K/UL (ref 130–450)
PMV BLD AUTO: 11 FL (ref 7–12)
POTASSIUM SERPL-SCNC: 3.7 MMOL/L (ref 3.5–5)
PROTHROMBIN TIME: 20.6 SEC (ref 9.3–12.4)
RBC # BLD AUTO: 4.24 M/UL (ref 3.8–5.8)
SERVICE CMNT-IMP: NORMAL
SERVICE CMNT-IMP: NORMAL
SODIUM SERPL-SCNC: 141 MMOL/L (ref 132–146)
SPECIMEN DESCRIPTION: NORMAL
SPECIMEN DESCRIPTION: NORMAL
WBC OTHER # BLD: 6.4 K/UL (ref 4.5–11.5)

## 2024-05-01 PROCEDURE — 72149 MRI LUMBAR SPINE W/DYE: CPT

## 2024-05-01 PROCEDURE — 6370000000 HC RX 637 (ALT 250 FOR IP)

## 2024-05-01 PROCEDURE — 85610 PROTHROMBIN TIME: CPT

## 2024-05-01 PROCEDURE — 85025 COMPLETE CBC W/AUTO DIFF WBC: CPT

## 2024-05-01 PROCEDURE — 36415 COLL VENOUS BLD VENIPUNCTURE: CPT

## 2024-05-01 PROCEDURE — A9577 INJ MULTIHANCE: HCPCS | Performed by: RADIOLOGY

## 2024-05-01 PROCEDURE — 80048 BASIC METABOLIC PNL TOTAL CA: CPT

## 2024-05-01 PROCEDURE — 82962 GLUCOSE BLOOD TEST: CPT

## 2024-05-01 PROCEDURE — 6360000002 HC RX W HCPCS: Performed by: NURSE PRACTITIONER

## 2024-05-01 PROCEDURE — 1200000000 HC SEMI PRIVATE

## 2024-05-01 PROCEDURE — 83036 HEMOGLOBIN GLYCOSYLATED A1C: CPT

## 2024-05-01 PROCEDURE — 2700000000 HC OXYGEN THERAPY PER DAY

## 2024-05-01 PROCEDURE — 72147 MRI CHEST SPINE W/DYE: CPT

## 2024-05-01 PROCEDURE — 6360000004 HC RX CONTRAST MEDICATION: Performed by: RADIOLOGY

## 2024-05-01 PROCEDURE — 72142 MRI NECK SPINE W/DYE: CPT

## 2024-05-01 PROCEDURE — 84100 ASSAY OF PHOSPHORUS: CPT

## 2024-05-01 PROCEDURE — 83735 ASSAY OF MAGNESIUM: CPT

## 2024-05-01 PROCEDURE — 99232 SBSQ HOSP IP/OBS MODERATE 35: CPT | Performed by: NURSE PRACTITIONER

## 2024-05-01 PROCEDURE — 6360000002 HC RX W HCPCS: Performed by: STUDENT IN AN ORGANIZED HEALTH CARE EDUCATION/TRAINING PROGRAM

## 2024-05-01 PROCEDURE — 99232 SBSQ HOSP IP/OBS MODERATE 35: CPT | Performed by: INTERNAL MEDICINE

## 2024-05-01 PROCEDURE — 2580000003 HC RX 258

## 2024-05-01 RX ORDER — KETOROLAC TROMETHAMINE 30 MG/ML
30 INJECTION, SOLUTION INTRAMUSCULAR; INTRAVENOUS ONCE
Status: COMPLETED | OUTPATIENT
Start: 2024-05-01 | End: 2024-05-01

## 2024-05-01 RX ORDER — LORAZEPAM 2 MG/ML
1 INJECTION INTRAMUSCULAR SEE ADMIN INSTRUCTIONS
Status: DISCONTINUED | OUTPATIENT
Start: 2024-05-01 | End: 2024-05-06 | Stop reason: ALTCHOICE

## 2024-05-01 RX ADMIN — QUETIAPINE FUMARATE 50 MG: 25 TABLET ORAL at 21:31

## 2024-05-01 RX ADMIN — TAMSULOSIN HYDROCHLORIDE 0.4 MG: 0.4 CAPSULE ORAL at 08:33

## 2024-05-01 RX ADMIN — GADOBENATE DIMEGLUMINE 17 ML: 529 INJECTION, SOLUTION INTRAVENOUS at 19:21

## 2024-05-01 RX ADMIN — ACETAMINOPHEN 650 MG: 325 TABLET ORAL at 21:31

## 2024-05-01 RX ADMIN — PANTOPRAZOLE SODIUM 40 MG: 40 TABLET, DELAYED RELEASE ORAL at 05:58

## 2024-05-01 RX ADMIN — SODIUM CHLORIDE, PRESERVATIVE FREE 10 ML: 5 INJECTION INTRAVENOUS at 08:33

## 2024-05-01 RX ADMIN — PETROLATUM: 420 OINTMENT TOPICAL at 08:34

## 2024-05-01 RX ADMIN — SODIUM CHLORIDE, PRESERVATIVE FREE 10 ML: 5 INJECTION INTRAVENOUS at 21:31

## 2024-05-01 RX ADMIN — ENOXAPARIN SODIUM 80 MG: 100 INJECTION SUBCUTANEOUS at 21:31

## 2024-05-01 RX ADMIN — KETOROLAC TROMETHAMINE 30 MG: 30 INJECTION, SOLUTION INTRAMUSCULAR at 17:57

## 2024-05-01 RX ADMIN — LORAZEPAM 1 MG: 2 INJECTION INTRAMUSCULAR; INTRAVENOUS at 17:57

## 2024-05-01 RX ADMIN — ONDANSETRON 4 MG: 4 TABLET, ORALLY DISINTEGRATING ORAL at 10:49

## 2024-05-01 RX ADMIN — ENOXAPARIN SODIUM 80 MG: 100 INJECTION SUBCUTANEOUS at 08:33

## 2024-05-01 RX ADMIN — ACETAMINOPHEN 650 MG: 325 TABLET ORAL at 13:59

## 2024-05-01 ASSESSMENT — PAIN DESCRIPTION - ORIENTATION: ORIENTATION: LOWER

## 2024-05-01 ASSESSMENT — PAIN DESCRIPTION - LOCATION: LOCATION: BACK

## 2024-05-01 ASSESSMENT — PAIN SCALES - GENERAL
PAINLEVEL_OUTOF10: 0
PAINLEVEL_OUTOF10: 2

## 2024-05-01 NOTE — PROGRESS NOTES
Chart reviewed.  No acute overnight events.  MRI reviewed, neurology following.  Goal is to discharge to acute rehab at [facility] before returning home.  Continue limited, no to all options CODE STATUS.  Patient not interested in hospice at this time.  [facility] hospice contacted by .  DaughterDee to sign papers to revoke hospice benefits.  Goals of care and CODE STATUS have been established. There are no further PM needs at this time.  PM will now sign off.  If new PM needs arise or patient's condition deteriorates, please re-consult.  Thank you.      Fidelia Crane, APRN - CNP  Palliative Medicine

## 2024-05-01 NOTE — PROGRESS NOTES
[facility]  Internal Medicine Residency Program  Progress Note - House Team     Patient:  Luis Seo 74 y.o. male MRN: [id number]     Date of Service: 5/1/2024     CC: fatigue and weakness  Overnight events: see below     Subjective     Patient was seen and examined this morning at bedside in no acute distress. Overnight patient had a black, tarry stool noted by RN. Patient states he has had black stools since admission at [facility] last month. Hb remains stable. Patient also wanted fentanyl patch order discontinued.     This morning, patient is sitting up in bed. He seems better rested compared to previous days. Of note, he went for his MRI's yesterday evening but could not tolerate the full exams 2/2 back pain. After the scans, his bed was placed in the hallway and he was not able to get anyone to assist him to use the restroom. As a result, he urinated on himself. Patient is understandably upset at this event.     Objective     Physical Exam:  Vitals: /73   Pulse 86   Temp 96.9 °F (36.1 °C) (Temporal)   Resp 16   Ht 1.778 m (5' 10\")   Wt 84.6 kg (186 lb 6.4 oz)   SpO2 96%   BMI 26.75 kg/m²     I & O - 24hr: No intake/output data recorded.   General Appearance: alert, appears stated age, and cooperative  HEENT:  Head: Normocephalic, no lesions, without obvious abnormality.  Neck: no JVD  Lung: clear to auscultation bilaterally  Heart: regular rate and rhythm, S1, S2 normal, no murmur, click, rub or gallop  Abdomen: soft, non-tender; bowel sounds normal; no masses,  no organomegaly  Extremities:  extremities normal, atraumatic, no cyanosis or edema  Musculokeletal: No joint swelling, no muscle tenderness. ROM normal in all joints of extremities.   Neurologic: Mental status: Alert, oriented, thought content appropriate    Pertinent Labs & Imaging Studies     CBC with Differential:    Lab Results   Component Value Date/Time    WBC 6.4 05/01/2024 05:25 AM    RBC 4.24 05/01/2024 05:25  AM    HGB 11.6 05/01/2024 05:25 AM    HCT 36.9 05/01/2024 05:25 AM     05/01/2024 05:25 AM    MCV 87.0 05/01/2024 05:25 AM    MCH 27.4 05/01/2024 05:25 AM    MCHC 31.4 05/01/2024 05:25 AM    RDW 14.9 05/01/2024 05:25 AM    LYMPHOPCT 30 05/01/2024 05:25 AM    MONOPCT 12 05/01/2024 05:25 AM    EOSPCT 7 05/01/2024 05:25 AM    BASOPCT 1 05/01/2024 05:25 AM    MONOSABS 0.78 05/01/2024 05:25 AM    EOSABS 0.42 05/01/2024 05:25 AM    BASOSABS 0.09 05/01/2024 05:25 AM     BMP:    Lab Results   Component Value Date/Time     05/01/2024 05:25 AM    K 3.7 05/01/2024 05:25 AM     05/01/2024 05:25 AM    CO2 27 05/01/2024 05:25 AM    BUN 7 05/01/2024 05:25 AM    CREATININE 0.6 05/01/2024 05:25 AM    CALCIUM 10.2 05/01/2024 05:25 AM    GLUCOSE 105 05/01/2024 05:25 AM    GLUCOSE 87 11/24/2010 11:00 AM     Magnesium:    Lab Results   Component Value Date/Time    MG 2.1 05/01/2024 05:25 AM     Phosphorus:    Lab Results   Component Value Date/Time    PHOS 3.6 05/01/2024 05:25 AM       MRI CERVICAL SPINE WO CONTRAST   Final Result   1. Mild disc herniations are present at multiple levels notable at C4/C5   resulting in minimal effacement of the ventral cord.  No significant central   canal stenosis.   2. Multiple levels of mild-to-moderate neural foraminal narrowing as   described above.   3. Multiple levels of disc space narrowing.         MRI THORACIC SPINE WO CONTRAST   Final Result   MRI thoracic spine:      No fracture or destructive osseous lesion.      Degenerative disc disease as described above.  No spinal canal or foraminal   stenosis in the thoracic spine.      MRI lumbar spine:      No acute fracture or destructive osseous lesion.      Degenerative disc disease as described above, exacerbating congenitally   narrow lumbar spinal canal.      Spinal canal narrowing, moderate at L4-5.      Foraminal narrowing, moderate at right L2-3 and left L3-4, mild-to-moderate   at left L4-5.         MRI LUMBAR SPINE WO

## 2024-05-01 NOTE — CARE COORDINATION
05/01/24 Update CM Note: Patient is now on general medical floor. He is on iv heparin drip and transitioning to coumadin. INR 1.9 today. He is pending MRI's with contrast as he could not complete yesterday due to pain. He has been given toradol x1 today. He is pending PT.OT for today. Acute rehab at [facility] is reviewing. Daughter also has a list of snf options. Will follow and await results of MRI's. Electronically signed by Sloane Lawton RN CM on 5/1/2024 at 1:56 PM

## 2024-05-01 NOTE — PROGRESS NOTES
[facility] Neurology follow up      Luis Seo is a 74 y.o. right handed male     Neurology following for hx of GBS, extremity weakness and fatigue     PMH of pancreatic cancer, renal cell cancer, atrial fibrillation, AAA, hypertension, hyperlipidemia and BPH     Presenting for evaluation of extremity weakness and fatigue. Symptoms at the present since over a month ago, 10 days after laparoscopic abdominal surgery for pancreatic adenocarcinoma with resection and ablation of both pancreatic and renal masses on 3/8/2024 at [facility]. Initially had both upper and lower extremity weakness with associated paresthesia and numbness involving both upper and lower extremities. There was also associated diplopia, dysphagia and dysarthria.  He had extensive evaluation at [facility] including MRI which showed extensive enhancement of the leptomeninges and CSF analysis showing elevated CSF proteins of 163 with glucose of 117, 2 WBCs, 30 RBCs and negative paraneoplastic markers.  He had a 5-day course of high-dose steroids, declined IVIG and was discharged to home hospice care and pain management.     He was brought to the ED for further evaluation of persistent fatigue and leg weakness.  He was noted to be hypoxic and CTA was positive for bilateral PE with right heart strain and significant clot burden and patient was admitted to the intensive care unit for observation.  He was transferred yesterday to the floors on heparin being transitioned to warfarin.  Neurology was consulted for further evaluation of persistent bilateral lower extremity weakness.    MRI of brain and spine were ordered w/wo pt was only able to get half the testing completed due to back pain.  Neurology would like contrast imaging to check for enhancement.      Pt lying in bed with son at bedside.  Discussed retrying MRI's with Ativan to help, pt agreed.      Objective:     /85   Pulse (!) 104   Temp 97.8 °F (36.6 °C) (Temporal)   Resp 16   Ht 1.778 m    Lab Results   Component Value Date/Time     05/01/2024 05:25 AM    K 3.7 05/01/2024 05:25 AM     05/01/2024 05:25 AM    CO2 27 05/01/2024 05:25 AM    BUN 7 05/01/2024 05:25 AM    CREATININE 0.6 05/01/2024 05:25 AM    GLUCOSE 105 05/01/2024 05:25 AM    GLUCOSE 87 11/24/2010 11:00 AM    CALCIUM 10.2 05/01/2024 05:25 AM    BILITOT 0.7 04/26/2024 11:34 AM    ALKPHOS 100 04/26/2024 11:34 AM    AST 35 04/26/2024 11:34 AM    ALT 16 04/26/2024 11:34 AM     Hepatic Function Panel:    Lab Results   Component Value Date/Time    ALKPHOS 100 04/26/2024 11:34 AM    ALT 16 04/26/2024 11:34 AM    AST 35 04/26/2024 11:34 AM    BILITOT 0.7 04/26/2024 11:34 AM     All labs and imaging studies reviewed independently today     Assessment:     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

## 2024-05-01 NOTE — PLAN OF CARE
Problem: Discharge Planning  Goal: Discharge to home or other facility with appropriate resources  5/1/2024 0937 by Swati Lowe RN  Outcome: Progressing     Problem: Safety - Adult  Goal: Free from fall injury  5/1/2024 0937 by Swati Lowe RN  Outcome: Progressing     Problem: ABCDS Injury Assessment  Goal: Absence of physical injury  5/1/2024 0937 by Swati Lowe RN  Outcome: Progressing     Problem: Skin/Tissue Integrity  Goal: Absence of new skin breakdown  Description: 1.  Monitor for areas of redness and/or skin breakdown  2.  Assess vascular access sites hourly  3.  Every 4-6 hours minimum:  Change oxygen saturation probe site  4.  Every 4-6 hours:  If on nasal continuous positive airway pressure, respiratory therapy assess nares and determine need for appliance change or resting period.  5/1/2024 0937 by Swati Lowe RN  Outcome: Progressing     Problem: Nutrition Deficit:  Goal: Optimize nutritional status  5/1/2024 0937 by Swati Lowe RN  Outcome: Progressing     Problem: Pain  Goal: Verbalizes/displays adequate comfort level or baseline comfort level  Outcome: Progressing

## 2024-05-01 NOTE — PLAN OF CARE
Problem: Discharge Planning  Goal: Discharge to home or other facility with appropriate resources  5/1/2024 0147 by Gracie Orellana RN  Outcome: Progressing  4/30/2024 1525 by Jacqueline Tripathi RN  Outcome: Progressing     Problem: Safety - Adult  Goal: Free from fall injury  5/1/2024 0147 by Gracie rOellana RN  Outcome: Progressing  4/30/2024 1525 by Jacqueline Tripathi RN  Outcome: Progressing     Problem: ABCDS Injury Assessment  Goal: Absence of physical injury  5/1/2024 0147 by Gracie Orellana RN  Outcome: Progressing  4/30/2024 1525 by Jacqueline Tripathi RN  Outcome: Progressing     Problem: Skin/Tissue Integrity  Goal: Absence of new skin breakdown  Description: 1.  Monitor for areas of redness and/or skin breakdown  2.  Assess vascular access sites hourly  3.  Every 4-6 hours minimum:  Change oxygen saturation probe site  4.  Every 4-6 hours:  If on nasal continuous positive airway pressure, respiratory therapy assess nares and determine need for appliance change or resting period.  5/1/2024 0147 by Gracie Orellana RN  Outcome: Progressing  4/30/2024 1525 by Jacqueline Tripathi, RN  Outcome: Progressing     Problem: Nutrition Deficit:  Goal: Optimize nutritional status  5/1/2024 0147 by Gracie Orellana RN  Outcome: Progressing  4/30/2024 1525 by Jacqueline Tripathi RN  Outcome: Progressing

## 2024-05-01 NOTE — PLAN OF CARE
Problem: Safety - Adult  Goal: Free from fall injury  5/1/2024 1736 by Namrata Mari RN  Outcome: Progressing  5/1/2024 0937 by Swati Lowe RN  Outcome: Progressing     Problem: ABCDS Injury Assessment  Goal: Absence of physical injury  5/1/2024 1736 by Namrata Mari RN  Outcome: Progressing  5/1/2024 0937 by Swati Lowe RN  Outcome: Progressing     Problem: Skin/Tissue Integrity  Goal: Absence of new skin breakdown  Description: 1.  Monitor for areas of redness and/or skin breakdown  2.  Assess vascular access sites hourly  3.  Every 4-6 hours minimum:  Change oxygen saturation probe site  4.  Every 4-6 hours:  If on nasal continuous positive airway pressure, respiratory therapy assess nares and determine need for appliance change or resting period.  5/1/2024 1736 by Namrata Mari RN  Outcome: Progressing  5/1/2024 0937 by Swati Lowe RN  Outcome: Progressing

## 2024-05-02 LAB
ANION GAP SERPL CALCULATED.3IONS-SCNC: 10 MMOL/L (ref 7–16)
ANION GAP SERPL CALCULATED.3IONS-SCNC: 14 MMOL/L (ref 7–16)
BASOPHILS # BLD: 0.06 K/UL (ref 0–0.2)
BASOPHILS NFR BLD: 1 % (ref 0–2)
BUN SERPL-MCNC: 10 MG/DL (ref 6–23)
BUN SERPL-MCNC: 9 MG/DL (ref 6–23)
CALCIUM SERPL-MCNC: 10 MG/DL (ref 8.6–10.2)
CALCIUM SERPL-MCNC: 10.2 MG/DL (ref 8.6–10.2)
CHLORIDE SERPL-SCNC: 100 MMOL/L (ref 98–107)
CHLORIDE SERPL-SCNC: 104 MMOL/L (ref 98–107)
CO2 SERPL-SCNC: 26 MMOL/L (ref 22–29)
CO2 SERPL-SCNC: 30 MMOL/L (ref 22–29)
CREAT SERPL-MCNC: 0.6 MG/DL (ref 0.7–1.2)
CREAT SERPL-MCNC: 0.6 MG/DL (ref 0.7–1.2)
EOSINOPHIL # BLD: 0.26 K/UL (ref 0.05–0.5)
EOSINOPHILS RELATIVE PERCENT: 5 % (ref 0–6)
ERYTHROCYTE [DISTWIDTH] IN BLOOD BY AUTOMATED COUNT: 15 % (ref 11.5–15)
GFR, ESTIMATED: >90 ML/MIN/1.73M2
GFR, ESTIMATED: >90 ML/MIN/1.73M2
GLUCOSE SERPL-MCNC: 107 MG/DL (ref 74–99)
GLUCOSE SERPL-MCNC: 177 MG/DL (ref 74–99)
HCT VFR BLD AUTO: 36.5 % (ref 37–54)
HGB BLD-MCNC: 11.5 G/DL (ref 12.5–16.5)
IMM GRANULOCYTES # BLD AUTO: <0.03 K/UL (ref 0–0.58)
IMM GRANULOCYTES NFR BLD: 0 % (ref 0–5)
LYMPHOCYTES NFR BLD: 1.46 K/UL (ref 1.5–4)
LYMPHOCYTES RELATIVE PERCENT: 28 % (ref 20–42)
MAGNESIUM SERPL-MCNC: 1.9 MG/DL (ref 1.6–2.6)
MCH RBC QN AUTO: 27.1 PG (ref 26–35)
MCHC RBC AUTO-ENTMCNC: 31.5 G/DL (ref 32–34.5)
MCV RBC AUTO: 86.1 FL (ref 80–99.9)
MONOCYTES NFR BLD: 0.67 K/UL (ref 0.1–0.95)
MONOCYTES NFR BLD: 13 % (ref 2–12)
NEUTROPHILS NFR BLD: 53 % (ref 43–80)
NEUTS SEG NFR BLD: 2.82 K/UL (ref 1.8–7.3)
PHOSPHATE SERPL-MCNC: 3.2 MG/DL (ref 2.5–4.5)
PLATELET # BLD AUTO: 298 K/UL (ref 130–450)
PMV BLD AUTO: 11.7 FL (ref 7–12)
POTASSIUM SERPL-SCNC: 3.3 MMOL/L (ref 3.5–5)
POTASSIUM SERPL-SCNC: 3.8 MMOL/L (ref 3.5–5)
RBC # BLD AUTO: 4.24 M/UL (ref 3.8–5.8)
SODIUM SERPL-SCNC: 140 MMOL/L (ref 132–146)
SODIUM SERPL-SCNC: 144 MMOL/L (ref 132–146)
WBC OTHER # BLD: 5.3 K/UL (ref 4.5–11.5)

## 2024-05-02 PROCEDURE — 6360000002 HC RX W HCPCS: Performed by: STUDENT IN AN ORGANIZED HEALTH CARE EDUCATION/TRAINING PROGRAM

## 2024-05-02 PROCEDURE — 2700000000 HC OXYGEN THERAPY PER DAY

## 2024-05-02 PROCEDURE — 2580000003 HC RX 258

## 2024-05-02 PROCEDURE — 6370000000 HC RX 637 (ALT 250 FOR IP)

## 2024-05-02 PROCEDURE — 97530 THERAPEUTIC ACTIVITIES: CPT

## 2024-05-02 PROCEDURE — 1200000000 HC SEMI PRIVATE

## 2024-05-02 PROCEDURE — 99232 SBSQ HOSP IP/OBS MODERATE 35: CPT | Performed by: CLINICAL NURSE SPECIALIST

## 2024-05-02 PROCEDURE — 85025 COMPLETE CBC W/AUTO DIFF WBC: CPT

## 2024-05-02 PROCEDURE — 36415 COLL VENOUS BLD VENIPUNCTURE: CPT

## 2024-05-02 PROCEDURE — 84100 ASSAY OF PHOSPHORUS: CPT

## 2024-05-02 PROCEDURE — 99232 SBSQ HOSP IP/OBS MODERATE 35: CPT | Performed by: INTERNAL MEDICINE

## 2024-05-02 PROCEDURE — 97535 SELF CARE MNGMENT TRAINING: CPT

## 2024-05-02 PROCEDURE — 80048 BASIC METABOLIC PNL TOTAL CA: CPT

## 2024-05-02 PROCEDURE — 2500000003 HC RX 250 WO HCPCS

## 2024-05-02 PROCEDURE — 83735 ASSAY OF MAGNESIUM: CPT

## 2024-05-02 RX ORDER — MAGNESIUM SULFATE HEPTAHYDRATE 40 MG/ML
2000 INJECTION, SOLUTION INTRAVENOUS ONCE
Status: COMPLETED | OUTPATIENT
Start: 2024-05-02 | End: 2024-05-02

## 2024-05-02 RX ORDER — POTASSIUM CHLORIDE 20 MEQ/1
20 TABLET, EXTENDED RELEASE ORAL ONCE
Status: COMPLETED | OUTPATIENT
Start: 2024-05-02 | End: 2024-05-02

## 2024-05-02 RX ORDER — MECOBALAMIN 5000 MCG
5 TABLET,DISINTEGRATING ORAL NIGHTLY PRN
Status: DISCONTINUED | OUTPATIENT
Start: 2024-05-02 | End: 2024-05-05

## 2024-05-02 RX ORDER — POTASSIUM CHLORIDE 20 MEQ/1
40 TABLET, EXTENDED RELEASE ORAL ONCE
Status: COMPLETED | OUTPATIENT
Start: 2024-05-02 | End: 2024-05-02

## 2024-05-02 RX ADMIN — ENOXAPARIN SODIUM 80 MG: 100 INJECTION SUBCUTANEOUS at 10:26

## 2024-05-02 RX ADMIN — TAMSULOSIN HYDROCHLORIDE 0.4 MG: 0.4 CAPSULE ORAL at 10:27

## 2024-05-02 RX ADMIN — ENOXAPARIN SODIUM 80 MG: 100 INJECTION SUBCUTANEOUS at 20:23

## 2024-05-02 RX ADMIN — PANTOPRAZOLE SODIUM 40 MG: 40 TABLET, DELAYED RELEASE ORAL at 06:04

## 2024-05-02 RX ADMIN — MAGNESIUM SULFATE HEPTAHYDRATE 2000 MG: 40 INJECTION, SOLUTION INTRAVENOUS at 10:41

## 2024-05-02 RX ADMIN — PETROLATUM: 420 OINTMENT TOPICAL at 20:32

## 2024-05-02 RX ADMIN — Medication 5 MG: at 20:23

## 2024-05-02 RX ADMIN — SODIUM CHLORIDE, PRESERVATIVE FREE 10 ML: 5 INJECTION INTRAVENOUS at 20:30

## 2024-05-02 RX ADMIN — POTASSIUM CHLORIDE 20 MEQ: 1500 TABLET, EXTENDED RELEASE ORAL at 10:25

## 2024-05-02 RX ADMIN — PETROLATUM: 420 OINTMENT TOPICAL at 10:29

## 2024-05-02 RX ADMIN — SODIUM CHLORIDE, PRESERVATIVE FREE 10 ML: 5 INJECTION INTRAVENOUS at 10:26

## 2024-05-02 RX ADMIN — POTASSIUM CHLORIDE 40 MEQ: 1500 TABLET, EXTENDED RELEASE ORAL at 10:26

## 2024-05-02 RX ADMIN — ACETAMINOPHEN 650 MG: 325 TABLET ORAL at 06:04

## 2024-05-02 NOTE — PLAN OF CARE
Received PS stating that patient would like to discontinue Seroquel. Went to floor to discuss with patient and patient's daughter Ranjeet. Ranjeet stated patient was started on Seroquel in Hospice after he had a one night of hallucinating. Ranjeet believes that hallucination episode was due to morphine and fentanyl that had been started for pain control on the same day. Patient stated that he would like to discontinue Seroquel and would only like a prn medication for sleep if needed. Informed patient that melatonin prn would be added and Seroquel would be discontinued if day time team was agreeable. Patient and Ranjeet verbalized understanding, answered all questions.     Electronically signed by Dee Arcos MD on 5/2/2024 at 5:11 PM

## 2024-05-02 NOTE — PROGRESS NOTES
Comprehensive Nutrition Assessment    Type and Reason for Visit:  Reassess    Nutrition Recommendations/Plan:     1.) Continue Current Diet as tolerated per SLP recs    2.) Modify Oral Nutrition Supplement to provide greater calories/ protein content       Malnutrition Assessment:  Malnutrition Status:  At risk for malnutrition (04/28/24 1327)    Context:  Acute Illness     Findings of the 6 clinical characteristics of malnutrition:  Energy Intake:  50% or less of estimated energy requirements for 5 or more days (x ~ 1mon)  Weight Loss:  Unable to assess (no EMR measured wts on file or per [facility] encounter reviews)     Body Fat Loss:  Unable to assess     Muscle Mass Loss:  Unable to assess    Fluid Accumulation:  No significant fluid accumulation     Strength:  Not Performed    Nutrition Assessment:    Pt status slowly improving now w/ PO diet advanced per SLP recs to dysphagia minced & moist/ thin liquids. Admit w/ fatigue & weakness found to have B/L pulmonary embolism.  PMHx BPH, Pancreatic CA w/ recent pancreatectomy x ~1 mon ago & Renal Cell CA s/p intervention/ ablation (follows @ [facility]). Noted dysphagia apparently on liquid diet x 1 mon PTA. Will change supplements to more calorically dense options now that off clear liquids- Ensure TID & Magic Cup BID. Noted discharge planning, will follow.    Nutrition Related Findings:    Pt alert, -I/O's 3L, +2 edema, active BS, weakness     Wound Type: None       Current Nutrition Intake & Therapies:    Average Meal Intake: 1-25% (average/ imrpvoing now that diet advanced from liquids)     ADULT DIET; Dysphagia - Minced and Moist  ADULT ORAL NUTRITION SUPPLEMENT; Breakfast, Lunch, Dinner; Standard High Calorie/High Protein Oral Supplement  ADULT ORAL NUTRITION SUPPLEMENT; Dinner, Lunch; Frozen Oral Supplement    Anthropometric Measures:  Height: 177.8 cm (5' 10\")  Ideal Body Weight (IBW): 166 lbs (75 kg)    Admission Body Weight: 83.9 kg (185 lb) (4/26 first

## 2024-05-02 NOTE — PROGRESS NOTES
Luis Seo is a 74 y.o. right handed male     Neurology following for hx of GBS, extremity weakness and fatigue     PMH of pancreatic cancer, renal cell cancer, atrial fibrillation, AAA, hypertension, hyperlipidemia and BPH     Presenting for evaluation of extremity weakness and fatigue. Symptoms at the present since over a month ago, 10 days after laparoscopic abdominal surgery for pancreatic adenocarcinoma with resection and ablation of both pancreatic and renal masses on 3/8/2024 at [facility]. Initially had both upper and lower extremity weakness with associated paresthesia and numbness involving both upper and lower extremities. There was also associated diplopia, dysphagia and dysarthria.  He had extensive evaluation at [facility] including MRI which showed extensive enhancement of the leptomeninges and CSF analysis showing elevated CSF proteins of 163 with glucose of 117, 2 WBCs, 30 RBCs and negative paraneoplastic markers.  He had a 5-day course of high-dose steroids, declined IVIG and was discharged to home hospice care and pain management.     He was brought to the ED for further evaluation of persistent fatigue and leg weakness.  He was noted to be hypoxic and CTA was positive for bilateral PE with right heart strain and significant clot burden and patient was admitted to the intensive care unit for observation.  He was transferred yesterday to the floors on heparin being transitioned to warfarin.  Neurology was consulted for further evaluation of persistent bilateral lower extremity weakness.    MRI of brain and spine were ordered w/wo pt was only able to get half the testing completed due to back pain.  Neurology would like contrast imaging to check for enhancement.      Pt lying in bed with son at bedside.  Discussed retrying MRI's with Ativan to help, pt agreed.      Objective:     /89   Pulse (!) 102   Temp 98 °F (36.7 °C) (Temporal)   Resp 16   Ht 1.778 m (5' 10\")   Wt 84.6 kg (186 lb 8.2 oz)    APRN - CNS  2:22 PM  5/2/2024

## 2024-05-02 NOTE — CARE COORDINATION
05/02/24 Update CM Note: patient remains on general medical floor. MRI's negative for enhancement. He remains lovenox. Spoke with carolyn this am. She is wanting the patient to go to acute level of care. Patient will need a prior authorization. He will need updated PT.OT today. She requested a referral to [facility] as well as ARU at [facility]. Spoke with Chani at [facility] and she will check with physician regarding acceptance and sent referral to Negrita at [facility]. Daughter has reviewed snf list and is not able to make a decision at this point. She did also reflect on the patient returning home with homecare and 24/7 care givers. Will await updated therapies for today and further work with patient. Will follow for response from rehab facilities. Electronically signed by Sloane Lawton RN CM on 5/2/2024 at 9:16 AM

## 2024-05-02 NOTE — PROGRESS NOTES
[facility]  Internal Medicine Residency Program  Progress Note - House Team       Patient:  Luis Seo 74 y.o. male   MRN: [id number]       Date of Service: 5/2/2024    CC: Fatigue and Weakness   Overnight events: STEPHANIE     Subjective     Patient seen and examined at bedside this a.m. Worked with PT this morning and states this was the furthest he has been able to walk in weeks. Weakness improving daily. Did not have reoccurrence of bloody stools overnight.  Denies F/C/N/V/CP/SOB/Abd pain.     Objective       Physical Exam  Vitals: /83   Pulse 85   Temp 97.5 °F (36.4 °C) (Temporal)   Resp 16   Ht 1.778 m (5' 10\")   Wt 84.6 kg (186 lb 8.2 oz)   SpO2 93%   BMI 26.76 kg/m²     I & O - 24hr: I/O this shift:  In: 770 [P.O.:720; IV Piggyback:50]  Out: 150 [Urine:150]   General Appearance: alert, appears stated age, and cooperative.  On RA  HEENT:  Head: Normal, normocephalic, atraumatic.  Neck: no carotid bruit and supple, symmetrical, trachea midline  Lung: clear to auscultation bilaterally  Heart: regular rate and rhythm and systolic murmur: systolic ejection 2/6, 5th intercostal space mid clavicular line     Abdomen: soft, non-tender; bowel sounds normal; no masses,  no organomegaly  Extremities:  extremities normal, atraumatic, no cyanosis or edema  Musculokeletal: 4/5 Strength in BUE, 4/5 Strength in BLE. Sensation intact. No spinous process step off deformities or pain with palpation. No joint swelling, no muscle tenderness.   Neurologic: Mental status: Alert, oriented, thought content appropriate    Diet:   ADULT DIET; Dysphagia - Minced and Moist  ADULT ORAL NUTRITION SUPPLEMENT; Breakfast, Lunch, Dinner; Standard High Calorie/High Protein Oral Supplement  ADULT ORAL NUTRITION SUPPLEMENT; Dinner, Lunch; Frozen Oral Supplement      Pertinent Labs & Imaging Studies     Labs    CBC with Differential:    Lab Results   Component Value Date/Time    WBC 5.3 05/02/2024 05:52 AM    RBC  Xarelto OP (off for ~9 weeks 2/2 #5 procedure)   BPH, on flomax OP  Dysphagia, liquid diet OP for last ~1 month       Plan   [facility] 15/24. Accepted to [facility] pending insurance authorization, awaiting confirmation   Neurology following, MRI negative for leptomeningeal/pathologic enhancement  SLP recommended moist and minced diet, will reassess for diet advancement   Replace electrolytes as needed   Monitor H&H  Continue oxygen and wean as tolerated, monitor respiratory status       PT/OT evaluation: 15/24  DVT prophylaxis: Lovenox   GI prophylaxis: Protonix   Diet:   ADULT DIET; Dysphagia - Minced and Moist  ADULT ORAL NUTRITION SUPPLEMENT; Breakfast, Lunch, Dinner; Standard High Calorie/High Protein Oral Supplement  ADULT ORAL NUTRITION SUPPLEMENT; Dinner, Lunch; Frozen Oral Supplement   Bowel regimen: Glycolax   Pain management: as needed  Code status: Limited   Disposition: Continue Current Care  Family: updated as available    Dee Arcos MD, PGY-1   Attending physician: Dr. Mishra         [facility]  Internal Medicine Faculty   Attending Physician Statement    Luis Seo is a 74 y.o. male was seen, examined and discussed with the multi-disciplinary teaching team during rounds. I have personally seen and examined the patient and the key elements of the encounter were performed by me. The data collected below information that was obtained, reviewed, analyzed and interpreted today. Imaging test are reviewed during rounds. Comparison to previous images are always explored.     CBC:   Lab Results   Component Value Date/Time    WBC 7.2 05/03/2024 05:29 AM    RBC 4.17 05/03/2024 05:29 AM    HGB 11.5 05/03/2024 05:29 AM    HCT 35.7 05/03/2024 05:29 AM     05/03/2024 05:29 AM    MCV 85.6 05/03/2024 05:29 AM       BMP:    Lab Results   Component Value Date/Time     05/03/2024 05:29 AM    K 3.7 05/03/2024 05:29 AM     05/03/2024 05:29 AM    CO2 25 05/03/2024 05:29 AM

## 2024-05-02 NOTE — PROGRESS NOTES
Met with patient and his daughter at bedside.  Patient would like to stay here for acute rehab.  Case reviewed with Dr. Payne and patient is accepted to our acute rehab pending insurance authorization.  Precert initiated with [facility] for [facility].

## 2024-05-02 NOTE — PROGRESS NOTES
[facility] [place]  Hematology/Oncology  Consult      Patient Name: Luis Seo  YOB: 1950  PCP: Brad Bassett MD   Referring Provider:      Reason for Consultation:   Chief Complaint   Patient presents with    Fatigue     Bilateral leg weakness, fatigue, Pancreatic CA had lrg portion removed 3 weeks ago. Denies pain, 87% on RA denies SOB        Subjective:  Back pain/spasms. Otherwise feels fairly well    History of Present Illness: This is a 74-year-male patient pancreatic adenocarcinoma and renal cell carcinoma with recent pancreatectomy and microwave ablation at [facility] who presents with concern of fatigue.  He had pancreatectomy and ablation done mid March and upon returning home 2 days later had profound weakness in bilateral upper and lower extremities.  His lower extremities eventually began having hyperesthesia.  He regained function of his upper extremities after about a month.  His bilateral lower extremities continue to be weak.  He also had diplopia at that time.  Extensive workup at [facility] revealed inflammation on MRI with extensive enhancement of the leptomeninges but serologic workup and lumbar puncture were unrevealing for cause.  He was started on steroids at 1 point.  Also consideration for GBS and IVIG at that time however patient decided not to start the treatment due to concerns for renal failure. It was thought that with his positive margins the chance of recurrence was exceptionally high. It was explained that for most patients recurrence occurs within 6 to 18 months of surgery. Median survival at the time of recurrence is often 3 to 6 months. Adjuvant chemotherapy after surgical resection and radiation for positive margins is usually considered, however it was felt that patient would not be able to tolerate these treatments secondary to his neurological debilitation. He was with comfort care. In the ED a CTA pulmonary showed bilateral pulmonary emboli with  Whipple procedure. 4. 4.2 x 3.0 cm cystic region in the lateral portion of the residual pancreatic head. This is probably a pseudocyst, although an abscess cannot be entirely excluded. 5. Mucosal thickening extending from the collapsed ascending colon through the descending colon which may be mild colitis. 6. Prominently enlarged prostate.     CT ABDOMEN PELVIS W IV CONTRAST Additional Contrast? None    Result Date: 4/26/2024  EXAMINATION: CT OF THE ABDOMEN AND PELVIS WITH CONTRAST; CTA OF THE CHEST 4/26/2024 3:19 pm TECHNIQUE: CT of the abdomen and pelvis was performed with the administration of intravenous contrast. Multiplanar reformatted images are provided for review. CTA of the chest was performed after the administration of intravenous contrast.  Multiplanar reformatted images are provided for review.  MIP images are provided for review. Automated exposure control, iterative reconstruction, and/or weight based adjustment of the mA/kV was utilized to reduce the radiation dose to as low as reasonably achievable. COMPARISON: None HISTORY: ORDERING SYSTEM PROVIDED HISTORY: post Whipple, fatigue, r/o abscess TECHNOLOGIST PROVIDED HISTORY: Additional Contrast?->None Reason for exam:->post Whipple, fatigue, r/o abscess Decision Support Exception - unselect if not a suspected or confirmed emergency medical condition->Emergency Medical Condition (MA) What reading provider will be dictating this exam?->CRC; ORDERING SYSTEM PROVIDED HISTORY: hypoxemia, r/o PE TECHNOLOGIST PROVIDED HISTORY: Reason for exam:->hypoxemia, r/o PE Decision Support Exception - unselect if not a suspected or confirmed emergency medical condition->Emergency Medical Condition (MA) What reading provider will be dictating this exam?->CRC FINDINGS: Chest: Pulmonary arteries: Prominent bilateral pulmonary embolism. Large amount of thrombus filling the right upper, middle, and lower lobe pulmonary arteries.  Thrombus extends into the anterior and

## 2024-05-02 NOTE — PROGRESS NOTES
Physical Therapy  Physical Therapy Treatment    Name: Lusi Seo  : 1950  MRN: [id number]      Date of Service: 2024    Evaluating PT:  Brad Skelton, PT, DPT [id number]    Room #:  5209/5209-A  Diagnosis:  Acute pulmonary embolism, unspecified pulmonary embolism type, unspecified whether acute cor pulmonale present (HCC) [I26.99]  PMHx/PSHx:   has no past medical history on file.   has no past surgical history on file.  Procedure/Surgery:  None  Precautions:  Falls, Equipment Owned:  FWW, cane, wheelchair  Equipment Needs:  TBD    SUBJECTIVE:    Pt lives alone in a 1 story home with 0 stair(s) to enter.  Family has been staying with patient .  Pt stated that he last ambulated ~1 month ago;  has been staying in bed;  sat up on EOB \"once in a while.\"    OBJECTIVE:   Initial Evaluation  Date: 2024 Treatment  Date: 24 Short Term/ Long Term   Goals   AM-PAC 6 Clicks 13/24 15/24    Was pt agreeable to Eval/treatment? Yes Yes     Does pt have pain? No No    Bed Mobility  Rolling: NT  Supine to sit: ModA  Sit to supine: ModA  Scooting: Karuna (seated) Rolling: NT  Supine to sit: Karuna  Sit to supine: NT  Scooting: Karuna (seated) Rolling: Independent  Supine to sit: Independent  Sit to supine: Independent  Scooting: Independent   Transfers Sit to stand: ModA  Stand to sit: ModA  Stand pivot: NT Sit to stand: Karuna with WW  Stand to sit: Karuna  Stand pivot: Karuna with WW Sit to stand: Mod I  Stand to sit: Mod I  Stand pivot: Mod I with AAD   Ambulation    3 feet (side steps) with WW ModA    15 feet with WW Karuna 50 feet x2 with WW and Karuna 100 feet with AAD Mod I   Stair negotiation: ascended and descended  NT NT TBD   ROM BUE:  See OT note  BLE:  WFL     Strength BUE:  See OT note  BLE:  Grossly 5/5     Balance Sitting EOB:  Supervision  Dynamic Standing:  ModA <> Karuna with WW Sitting EOB:  Supervision  Dynamic Standing:  Karuna with WW Sitting EOB:  Independent  Dynamic Standing:  Mod I with AAD     Pt is

## 2024-05-02 NOTE — PROGRESS NOTES
Occupational Therapy  OT BEDSIDE TREATMENT NOTE   [facility]  [address] [place], [place]        Date:2024  Patient Name: Luis Seo  MRN: [id number]  : 1950  Room: [address] [place] [address]     Per OT Eval:    Evaluating OT: Leslie Hansen OTR/L; [id number]        Referring Provider: Anais Kennedy MD     Specific Provider Orders/Date: OT Eval and Treat 24        Diagnosis: Acute pulmonary embolism, unspecified pulmonary embolism type, unspecified whether acute cor pulmonale present      Surgery: None this admission     Pertinent Medical History:  has no past medical history on file.      Recommended Adaptive Equipment: TBD      Precautions:  Fall Risk, h/o pancreatic CA, clear liquid diet      Assessment of current deficits    [x] Functional mobility            [x]ADLs           [x] Strength                  []Cognition    [x] Functional transfers          [x] IADLs         [x] Safety Awareness   [x]Endurance    [x] Fine Coordination                         [x] Balance      [] Vision/perception   []Sensation      []Gross Motor Coordination             [] ROM           [] Delirium                   [] Motor Control      OT PLAN OF CARE   OT POC based on physician orders, patient diagnosis and results of clinical assessment     Frequency/Duration 1-3 days/wk for 2 weeks PRN   Specific OT Treatment Interventions to include:   * Instruction/training on adapted ADL techniques and AE recommendations to increase functional independence within precautions       * Training on energy conservation strategies, correct breathing pattern and techniques to improve independence/tolerance for self-care routine  * Functional transfer/mobility training/DME recommendations for increased independence, safety, and fall prevention  * Patient/Family education to increase follow through with safety techniques and functional independence  * Recommendation of

## 2024-05-02 NOTE — CARE COORDINATION
05/02/24 Update CM Note: [facility] and precert is being started today. Electronically signed by Sloane Lawton RN CM on 5/2/2024 at 12:54 PM

## 2024-05-03 LAB
ANION GAP SERPL CALCULATED.3IONS-SCNC: 12 MMOL/L (ref 7–16)
BASOPHILS # BLD: 0.08 K/UL (ref 0–0.2)
BASOPHILS NFR BLD: 1 % (ref 0–2)
BUN SERPL-MCNC: 9 MG/DL (ref 6–23)
CALCIUM SERPL-MCNC: 9.7 MG/DL (ref 8.6–10.2)
CHLORIDE SERPL-SCNC: 104 MMOL/L (ref 98–107)
CO2 SERPL-SCNC: 25 MMOL/L (ref 22–29)
CREAT SERPL-MCNC: 0.5 MG/DL (ref 0.7–1.2)
EOSINOPHIL # BLD: 0.4 K/UL (ref 0.05–0.5)
EOSINOPHILS RELATIVE PERCENT: 6 % (ref 0–6)
ERYTHROCYTE [DISTWIDTH] IN BLOOD BY AUTOMATED COUNT: 15.3 % (ref 11.5–15)
GFR, ESTIMATED: >90 ML/MIN/1.73M2
GLUCOSE SERPL-MCNC: 116 MG/DL (ref 74–99)
HCT VFR BLD AUTO: 35.7 % (ref 37–54)
HGB BLD-MCNC: 11.5 G/DL (ref 12.5–16.5)
IMM GRANULOCYTES # BLD AUTO: 0.04 K/UL (ref 0–0.58)
IMM GRANULOCYTES NFR BLD: 1 % (ref 0–5)
LYMPHOCYTES NFR BLD: 1.73 K/UL (ref 1.5–4)
LYMPHOCYTES RELATIVE PERCENT: 24 % (ref 20–42)
MAGNESIUM SERPL-MCNC: 2 MG/DL (ref 1.6–2.6)
MCH RBC QN AUTO: 27.6 PG (ref 26–35)
MCHC RBC AUTO-ENTMCNC: 32.2 G/DL (ref 32–34.5)
MCV RBC AUTO: 85.6 FL (ref 80–99.9)
MONOCYTES NFR BLD: 0.79 K/UL (ref 0.1–0.95)
MONOCYTES NFR BLD: 11 % (ref 2–12)
NEUTROPHILS NFR BLD: 58 % (ref 43–80)
NEUTS SEG NFR BLD: 4.19 K/UL (ref 1.8–7.3)
PHOSPHATE SERPL-MCNC: 2.8 MG/DL (ref 2.5–4.5)
PLATELET # BLD AUTO: 302 K/UL (ref 130–450)
PMV BLD AUTO: 11.4 FL (ref 7–12)
POTASSIUM SERPL-SCNC: 3.7 MMOL/L (ref 3.5–5)
RBC # BLD AUTO: 4.17 M/UL (ref 3.8–5.8)
SODIUM SERPL-SCNC: 141 MMOL/L (ref 132–146)
TROPONIN I SERPL HS-MCNC: 31 NG/L (ref 0–11)
WBC OTHER # BLD: 7.2 K/UL (ref 4.5–11.5)

## 2024-05-03 PROCEDURE — 97530 THERAPEUTIC ACTIVITIES: CPT

## 2024-05-03 PROCEDURE — 1200000000 HC SEMI PRIVATE

## 2024-05-03 PROCEDURE — 97535 SELF CARE MNGMENT TRAINING: CPT

## 2024-05-03 PROCEDURE — 85025 COMPLETE CBC W/AUTO DIFF WBC: CPT

## 2024-05-03 PROCEDURE — 2580000003 HC RX 258

## 2024-05-03 PROCEDURE — 93005 ELECTROCARDIOGRAM TRACING: CPT

## 2024-05-03 PROCEDURE — 83735 ASSAY OF MAGNESIUM: CPT

## 2024-05-03 PROCEDURE — 99232 SBSQ HOSP IP/OBS MODERATE 35: CPT | Performed by: INTERNAL MEDICINE

## 2024-05-03 PROCEDURE — 36415 COLL VENOUS BLD VENIPUNCTURE: CPT

## 2024-05-03 PROCEDURE — 6360000002 HC RX W HCPCS: Performed by: STUDENT IN AN ORGANIZED HEALTH CARE EDUCATION/TRAINING PROGRAM

## 2024-05-03 PROCEDURE — 84100 ASSAY OF PHOSPHORUS: CPT

## 2024-05-03 PROCEDURE — 84484 ASSAY OF TROPONIN QUANT: CPT

## 2024-05-03 PROCEDURE — 6370000000 HC RX 637 (ALT 250 FOR IP)

## 2024-05-03 PROCEDURE — 80048 BASIC METABOLIC PNL TOTAL CA: CPT

## 2024-05-03 RX ORDER — POTASSIUM CHLORIDE 20 MEQ/1
20 TABLET, EXTENDED RELEASE ORAL ONCE
Status: COMPLETED | OUTPATIENT
Start: 2024-05-03 | End: 2024-05-03

## 2024-05-03 RX ADMIN — ENOXAPARIN SODIUM 80 MG: 100 INJECTION SUBCUTANEOUS at 21:16

## 2024-05-03 RX ADMIN — PANTOPRAZOLE SODIUM 40 MG: 40 TABLET, DELAYED RELEASE ORAL at 06:20

## 2024-05-03 RX ADMIN — Medication 5 MG: at 21:17

## 2024-05-03 RX ADMIN — ONDANSETRON 4 MG: 4 TABLET, ORALLY DISINTEGRATING ORAL at 11:21

## 2024-05-03 RX ADMIN — ENOXAPARIN SODIUM 80 MG: 100 INJECTION SUBCUTANEOUS at 08:25

## 2024-05-03 RX ADMIN — ACETAMINOPHEN 650 MG: 325 TABLET ORAL at 08:23

## 2024-05-03 RX ADMIN — ONDANSETRON 4 MG: 4 TABLET, ORALLY DISINTEGRATING ORAL at 21:16

## 2024-05-03 RX ADMIN — SODIUM CHLORIDE, PRESERVATIVE FREE 10 ML: 5 INJECTION INTRAVENOUS at 08:26

## 2024-05-03 RX ADMIN — PETROLATUM: 420 OINTMENT TOPICAL at 08:27

## 2024-05-03 RX ADMIN — POTASSIUM CHLORIDE 20 MEQ: 1500 TABLET, EXTENDED RELEASE ORAL at 08:25

## 2024-05-03 RX ADMIN — TAMSULOSIN HYDROCHLORIDE 0.4 MG: 0.4 CAPSULE ORAL at 08:26

## 2024-05-03 ASSESSMENT — PAIN SCALES - GENERAL: PAINLEVEL_OUTOF10: 5

## 2024-05-03 ASSESSMENT — PAIN - FUNCTIONAL ASSESSMENT: PAIN_FUNCTIONAL_ASSESSMENT: ACTIVITIES ARE NOT PREVENTED

## 2024-05-03 ASSESSMENT — PAIN DESCRIPTION - DESCRIPTORS: DESCRIPTORS: DISCOMFORT;ACHING;NAGGING

## 2024-05-03 ASSESSMENT — PAIN DESCRIPTION - LOCATION: LOCATION: ABDOMEN

## 2024-05-03 ASSESSMENT — PAIN DESCRIPTION - ORIENTATION: ORIENTATION: LOWER

## 2024-05-03 NOTE — PROGRESS NOTES
Physical Therapy  Physical Therapy Treatment    Name: Luis Seo  : 1950  MRN: [id number]      Date of Service: 5/3/2024    Evaluating PT:  Brad Skelton, PT, DPT [id number]    Room #:  5209/5209-A  Diagnosis:  Acute pulmonary embolism, unspecified pulmonary embolism type, unspecified whether acute cor pulmonale present (HCC) [I26.99]  PMHx/PSHx:   has no past medical history on file.   has no past surgical history on file.  Procedure/Surgery:  None  Precautions:  Falls, Equipment Owned:  FWW, cane, wheelchair  Equipment Needs:  TBD    SUBJECTIVE:    Pt lives alone in a 1 story home with 0 stair(s) to enter.  Family has been staying with patient .  Pt stated that he last ambulated ~1 month ago;  has been staying in bed;  sat up on EOB \"once in a while.\"    OBJECTIVE:   Initial Evaluation  Date: 2024 Treatment  Date: 5/3/24 Short Term/ Long Term   Goals   AM-PAC 6 Clicks 13/24 15/24    Was pt agreeable to Eval/treatment? Yes Yes     Does pt have pain? No No    Bed Mobility  Rolling: NT  Supine to sit: ModA  Sit to supine: ModA  Scooting: Karuna (seated) Rolling: NT  Supine to sit: NT  Sit to supine: NT  Scooting: Karuna (seated) Rolling: Independent  Supine to sit: Independent  Sit to supine: Independent  Scooting: Independent   Transfers Sit to stand: ModA  Stand to sit: ModA  Stand pivot: NT Sit to stand: Karuna with WW  Stand to sit: Karuna  Stand pivot: Karuna with WW Sit to stand: Mod I  Stand to sit: Mod I  Stand pivot: Mod I with AAD   Ambulation    3 feet (side steps) with WW ModA    15 feet with WW Karuna 50 feet x4 with WW and Karuna 100 feet with AAD Mod I   Stair negotiation: ascended and descended  NT NT TBD   ROM BUE:  See OT note  BLE:  WFL     Strength BUE:  See OT note  BLE:  Grossly 5/5     Balance Sitting EOB:  Supervision  Dynamic Standing:  ModA <> Karuna with WW Sitting EOB:  Supervision  Dynamic Standing:  Karuna with WW Sitting EOB:  Independent  Dynamic Standing:  Mod I with AAD     Pt is A

## 2024-05-03 NOTE — CONSULTS
mg, 650 mg, Rectal, Q6H PRN, Anais Kennedy MD    Family History: No family history on file.    Social/Functional History:  reports that he has quit smoking. He has never used smokeless tobacco. He reports that he does not drink alcohol and does not use drugs.    Premorbid Functional Status: {functional status:93888}  Support System and Family Circumstances (i.e., potential caregivers): {Rehab support system:98825}   Home Environment / Accessibility: {Rehab home environment / accessibility:45947}  Communication: {limitations:17294}    Review of Systems:{Review Of Systems:04500}    Physical Exam:    Vitals reviewed.  I/O last 3 completed shifts:  In: 970 [P.O.:920; IV Piggyback:50]  Out: 1425 [Urine:1425]  I/O this shift:  In: 360 [P.O.:360]  Out: 200 [Urine:200]  Vitals:    05/03/24 1141   BP: (!) 127/91   Pulse: 95   Resp: 16   Temp: 98.2 °F (36.8 °C)   SpO2: 95%       GENERAL ASSESSMENT: {pe gen appearance (brief) peds:541486::\"well developed and well nourished\"}  SKIN: {pe skin peds brief:607636::\"normal color, no lesions\"}  HEAD: {pe head infant brief:038110::\"normocephalic\"}  EYES: {pe eyes infant brief:928635::\"normal eyes\"}  EARS: {ear brief:282353::\"normal\"}  NOSE: {pe nose infant:932404::\"normal external appearance\",\"nares patent\"}  MOUTH: {pe mouth infant:257808::\"normal mouth and throat\"}  NECK: {pe neck peds brief:311649::\"normal\"}  CHEST: {pe lungs peds brief:757183::\"normal air exchange\",\"no rales\",\"no rhonchi\",\"no wheezes\",\"respiratory effort normal with no retractions\"}  HEART: {pe heart peds brief:016028::\"regular rate and rhythm\",\"normal S1/S2\",\"no murmurs\"}  ABDOMEN: {pe abdomen infant:633201::\"soft\",\"non-distended\",\"no masses\",\"no hepatosplenomegaly\"}  GENITALIA: {pe genitalia female peds:989259::\"normal external genitalia\"}  SPINE: {pe spine peds:521407}  EXTREMITY: {pe extremity peds brief:498310::\"normal and symmetric movement\",\"normal range of motion\",\"no joint swelling\"}      Detailed  {0-4+:54139}  Right Triceps reflex {0-4+:81535}  Left Triceps reflex {0-4+:26381}  Right Quadriceps reflex {0-4+:32794}  Left Quadriceps reflex {0-4+:54022}  Right Achilles reflex {0-4+:34666}  Left Achilles reflex {0-4+:31030}      ASSESSMENT AND PLAN      Patient Active Problem List   Diagnosis    Acute pulmonary embolism, unspecified pulmonary embolism type, unspecified whether acute cor pulmonale present ([facility])       1:  Primary indication for inpatient rehabilitation admission over other settings:  {ADMISSION INDICATIONS:603734364}  2:  Comorbidities:  {COMORBIDITIES:123714649}  3.  Estimated length of stay:  ***  4.  Anticipated disposition:  {DISPOSITIONS:233281577}.  The potential to achieve that is {RATIN}.  5:  Precautions:  {PRECAUTIONS:498506462}            Luis NUÑEZ Maximgiorgio would benefit from an inpatient rehab stay to improve functional outcome of impaired mobility, ADL's, transfers, and self-care.   {Assessment:}    Additional comments:I discussed his recent history with admission coordinator.  Will complete peer to peer if required.

## 2024-05-03 NOTE — CARE COORDINATION
05/03/24 Update Cm Note: [facility]. She will review the chart. Electronically signed by Sloane Lawton RN CM on 5/3/2024 at 2:02 PM

## 2024-05-03 NOTE — PLAN OF CARE
PS received for concern of acute severe nausea.    Patient seen and evaluated at bedside. He is resting comfortably in bed.  He states that he had severe nausea while playing cards with his daughter earlier today.  He states this is never happened before.  Though he felt like he needed to vomit multiple times he did not.  Denies any dark stools.  Did receive oral potassium supplementation this morning.  He states when he received Zofran it provided relief.    Vitals: HR 98, RR 12  Constitutional: Resting comfortably in bed   Cardio: irregular irregular  Pulm: CTA BL  Abdomen: BS +4, epigastric tenderness present, Reilly's negative, no splenomegaly or hepatomegaly  Extremities: No cyanosis, LLE slightly larger than RLE, no erythema or pain in calf    Continue PRN Zofran, may consider addition of Tums if needed. Possible this could be relative to paroxysmal atrial fibrillation vs gastritis in setting of stress and poor appetite on minced and moist diet.  Will order troponin and EKG STAT to rule out ACS.     Discussed with bedside RN.    Electronically signed by Beatrice Lares DO on 5/3/2024 at 2:47 PM

## 2024-05-03 NOTE — CARE COORDINATION
05/03/24 Update CM Note: Patient is pending ARU precert completion. Labs are improved this am. Will await response for rehab. Electronically signed by Sloane Lawton RN CM on 5/3/2024 at 9:42 AM

## 2024-05-03 NOTE — DISCHARGE INSTR - COC
Continuity of Care Form    Patient Name: Luis Seo   :  1950  MRN:  [id number]    Admit date:  2024  Discharge date:  2024    Code Status Order: Limited   Advance Directives:     Admitting Physician:  Maxi Weiss MD  PCP: Brad Bassett MD    Discharging Nurse: Katerina Bruce RN  Discharging Hospital Unit/Room#: 5209/5209-A  Discharging Unit Phone Number: [phone]    Emergency Contact:   Extended Emergency Contact Information  Primary Emergency Contact: Dee Elias  Home Phone: [phone]  Relation: Child  Secondary Emergency Contact: tanna keller  Relation: Child  Preferred language: English   needed? No    Past Surgical History:  No past surgical history on file.    Immunization History:   Immunization History   Administered Date(s) Administered    COVID-19, MODERNA Bivalent, (age 12y+), IM, 50 mcg/0.5 mL 2022    COVID-19, MODERNA, ( formula), (age 12y+), IM, 50mcg/0.5mL 2023       Active Problems:  Patient Active Problem List   Diagnosis Code    Acute pulmonary embolism, unspecified pulmonary embolism type, unspecified whether acute cor pulmonale present ([facility]) I26.99       Isolation/Infection:   Isolation            No Isolation          Patient Infection Status       None to display            Nurse Assessment:  Last Vital Signs: /89   Pulse 90   Temp 98.8 °F (37.1 °C) (Temporal)   Resp 16   Ht 1.778 m (5' 10\")   Wt 84.6 kg (186 lb 8.2 oz)   SpO2 94%   BMI 26.76 kg/m²     Last documented pain score (0-10 scale): Pain Level: 5  Last Weight:   Wt Readings from Last 1 Encounters:   24 84.6 kg (186 lb 8.2 oz)     Mental Status:  alert    IV Access:  - None    Nursing Mobility/ADLs:  Walking   Independent  Transfer  Independent  Bathing  Independent  Dressing  Independent  Toileting  Independent  Feeding  Independent  Med Admin  Assisted  Med Delivery   whole    Wound Care Documentation and Therapy:       MD on 5/4/24 at 8:05 AM AGUEDA

## 2024-05-03 NOTE — PROGRESS NOTES
Patient denied acute rehab admission per payer source.  Peer to peer done per Dr. Payne and denial upheld.  Sloane Abernathy notified.

## 2024-05-03 NOTE — CARE COORDINATION
05/03/24 Update CM Note: patient denied by acute rehab per insurance despite peer to peer. Spoke with patient and daughter who have reviewed the community snf list and have chose 1) [facility] (referral called to [facility] and faxed clinical) 2) [facility] and 3) [facility]. Will follow for acceptance. Electronically signed by Sloane Lawton RN CM  on 5/3/2024 at 12:50 PM        The Plan for Transition of Care is related to the following treatment goals: rehab    The Patient and/or patient representative  was provided with a choice of provider and agrees   with the discharge plan. [x] Yes [] No    Freedom of choice list was provided with basic dialogue that supports the patient's individualized plan of care/goals, treatment preferences and shares the quality data associated with the providers. [x] Yes [] No

## 2024-05-03 NOTE — CARE COORDINATION
05/03/24 Update CM Note: Referral remains pending for [facility], Call placed to [facility] to check status of case review. She left for the day. Received a call from Sangita at [facility] and they are able to accept and precert will be started today. [facility]lin/destination completed. Ambulance form initiated and will need complete at discharge. [facility] ambulance set up for discharge in the am with pickup time 11:00AM. If this needs cancelled please call [phone]. Electronically signed by Sloane Lawton RN CM on 5/3/2024 at 3:50 PM

## 2024-05-03 NOTE — PROGRESS NOTES
Occupational Therapy  OT BEDSIDE TREATMENT NOTE   [facility]  [address], [place]        Date:5/3/2024  Patient Name: Luis Seo  MRN: [id number]  : 1950  Room: [address] [place] [address]     Per OT Eval:    Evaluating OT: Leslie Hansen OTR/L; [id number]        Referring Provider: Anais Kennedy MD     Specific Provider Orders/Date: OT Eval and Treat 24        Diagnosis: Acute pulmonary embolism, unspecified pulmonary embolism type, unspecified whether acute cor pulmonale present      Surgery: None this admission     Pertinent Medical History:  has no past medical history on file.      Recommended Adaptive Equipment: TBD      Precautions:  Fall Risk, h/o pancreatic CA, clear liquid diet      Assessment of current deficits    [x] Functional mobility            [x]ADLs           [x] Strength                  []Cognition    [x] Functional transfers          [x] IADLs         [x] Safety Awareness   [x]Endurance    [x] Fine Coordination                         [x] Balance      [] Vision/perception   []Sensation      []Gross Motor Coordination             [] ROM           [] Delirium                   [] Motor Control      OT PLAN OF CARE   OT POC based on physician orders, patient diagnosis and results of clinical assessment     Frequency/Duration 1-3 days/wk for 2 weeks PRN   Specific OT Treatment Interventions to include:   * Instruction/training on adapted ADL techniques and AE recommendations to increase functional independence within precautions       * Training on energy conservation strategies, correct breathing pattern and techniques to improve independence/tolerance for self-care routine  * Functional transfer/mobility training/DME recommendations for increased independence, safety, and fall prevention  * Patient/Family education to increase follow through with safety techniques and functional independence  * Recommendation of  dressing tasks    Comments: Upon arrival pt seated upright in chair, agreeable to therapy, speaking with nursing okaying pt to be seen this session. Rest breaks provided throughout session as needed. Monitoring of pt's O2 on RA, with pt maintaining above 90% with movement and at rest. Pt stating of feeling slightly SOB after ambulating, cueing on deep breathing and energy conservation techniques. At end of session, pt seated upright in recliner chair, PT present.    Pt has made fair progress towards set goals.   Continue with current plan of care      Treatment Time In: 10:00am           Treatment Time Out: 10:15am                 Treatment Charges: Mins Units   Ther Ex  17839     Manual Therapy 61110     Thera Activities 51053     ADL/Home Mgt 74965 15 1   Neuro Re-ed 22209     Group Therapy      Orthotic manage/training  24498     Non-Billable Time     Total Timed Treatment 15 1        Diane BORJA/MARYBETH [id number]

## 2024-05-03 NOTE — PROGRESS NOTES
Acute Rehab Pre-Admission Screen      Referral date: 4/30/2024  Onset/Hospital Admit Date: 4/26/2024  9:53 AM    Current Location: 5209/5209-A    Name: Luis Seo  YOB: 1950  Age: 74 y.o.  Admitting Diagnosis: ***   Address: ***  Home Phone: [phone] (home)  Social Security #:     Sex: male  Race:{AO RACE:54833}  Ethnicity: {AO race:15040}    Marital Status: {Desc; marital status:62}     Ethnic/Cultural/Latter day Considerations: ***                ADVANCED DIRECTIVES:     {rehab advanced directives:15520}   {copies:70323}                 COVERAGE INFORMATION   Primary Insurer: ***  Payor Contact: ***  Phone: ***  FAX:***  Authorization #: ***  Secondary Insurer: ***  Medicare #: ***  Medicaid #: ***  Verified coverage with : {verified coverage:05034}    COVID SCREEN DATE:   ***  Result: {positive:09644}    MEDICAL UPDATE:  History of present admission: ***    PHYSICIAN / REFERRAL INFORMATION  Referring Physician: ***  Attending Physician: Maxi Weiss MD  Primary Care Physician: Brad Bassett MD  Consultants/Opinions (see full consult notes on chart): ***    SOCIAL INFORMATION  Primary  Contact: ***  Relationship: ***  Primary Phone: ***  Secondary Phone: ***  Secondary  Contact: ***  Relationship: ***  Primary Phone: ***  Secondary Phone: ***    Previous Community Services: ***  Caregiver available:  {YES/NO:96627} Hours per day available: ***  Patient previously employed:  {pt employment:24487} Occupation/Profession: ***    Prior living arrangements: {pt living arrangements:55991}  Lived with:  {lived with:84738}  Contact phone: ***    Home:  *** story home  *** entry steps  Rails: ***  Steps:  *** to 2nd floor  Rails:  ***    Bedroom: {floor:30387}  Bathroom:  {floor:24869}    PRIOR LEVEL OF FUNCTION  Independent for: ***  Assistance for: ***  Dependent for: ***  Dominant hand: {hand:29085}    PREVIOUS HOME EQUIPMENT    {rehab home equipment:50935}    HISTORY      Has

## 2024-05-03 NOTE — PROGRESS NOTES
[facility]  Internal Medicine Residency Program  Progress Note - House Team     Patient:  Luis Seo 74 y.o. male MRN: [id number]     Date of Service: 5/3/2024     CC: Bilateral lower extremity and generalized weakness    Days since admission: 7    Subjective     Overnight events: No acute events overnight.    Patient seen and examined at bedside in no acute distress.  He is feeling a little more fatigued today.  He is excited at the prospect of rehabilitation.  He understands that though he has been accepted to the ARU he is waiting for insurance authorization.  He has additional choices if his insurance does not allow him to go to the rehab unit.  He otherwise denies chest pain, shortness of breath, abdominal pain, or extremity pain.     Objective     Physical Exam:  Vitals: BP (!) 127/91   Pulse 95   Temp 98.2 °F (36.8 °C) (Temporal)   Resp 16   Ht 1.778 m (5' 10\")   Wt 84.6 kg (186 lb 8.2 oz)   SpO2 95%   BMI 26.76 kg/m²     I & O - 24hr:   Intake/Output Summary (Last 24 hours) at 5/3/2024 1310  Last data filed at 5/3/2024 1200  Gross per 24 hour   Intake 600 ml   Output 625 ml   Net -25 ml      General Appearance: alert, appears stated age, and cooperative  HEENT:  Head: Normocephalic, no lesions, without obvious abnormality.  Neck: supple, symmetrical, trachea midline  Lung: clear to auscultation bilaterally  Heart: irregularly irregular rhythm  Abdomen: soft, non-tender; bowel sounds normal; no masses,  no organomegaly  Extremities:  extremities normal, atraumatic, no cyanosis or edema and 3/5 muscle strength in BLE  Musculokeletal: No joint swelling, no muscle tenderness. ROM normal in all joints of extremities.   Neurologic: Mental status: Alert, oriented, thought content appropriate  Subjective  Pertinent Information & Imaging Studies, Consults     CBC:   Lab Results   Component Value Date/Time    WBC 7.2 05/03/2024 05:29 AM    RBC 4.17 05/03/2024 05:29 AM    HGB 11.5

## 2024-05-03 NOTE — PROGRESS NOTES
Speech Language Pathology  NAME:  Luis Seo  :  1950  DATE: 5/3/2024  ROOM:  5209/5209-A    Pt unavailable at 1430 for Dysphagia therapy     REASON:  Declined intervention, Pt reports his stomach has been bothering him all day and he does not want anything to eat right now    Will re-attempt as appropriate.       Thank You    Marlene Crane M.S., CCC-SLP  Speech-Language Pathologist  [id number]  5/3/2024       20

## 2024-05-04 LAB
ANION GAP SERPL CALCULATED.3IONS-SCNC: 12 MMOL/L (ref 7–16)
BASOPHILS # BLD: 0.09 K/UL (ref 0–0.2)
BASOPHILS NFR BLD: 1 % (ref 0–2)
BUN SERPL-MCNC: 7 MG/DL (ref 6–23)
CALCIUM SERPL-MCNC: 10.2 MG/DL (ref 8.6–10.2)
CHLORIDE SERPL-SCNC: 104 MMOL/L (ref 98–107)
CO2 SERPL-SCNC: 24 MMOL/L (ref 22–29)
CREAT SERPL-MCNC: 0.6 MG/DL (ref 0.7–1.2)
EOSINOPHIL # BLD: 0.37 K/UL (ref 0.05–0.5)
EOSINOPHILS RELATIVE PERCENT: 4 % (ref 0–6)
ERYTHROCYTE [DISTWIDTH] IN BLOOD BY AUTOMATED COUNT: 15.3 % (ref 11.5–15)
GFR, ESTIMATED: >90 ML/MIN/1.73M2
GLUCOSE SERPL-MCNC: 122 MG/DL (ref 74–99)
HCT VFR BLD AUTO: 36.5 % (ref 37–54)
HGB BLD-MCNC: 11.6 G/DL (ref 12.5–16.5)
IMM GRANULOCYTES # BLD AUTO: 0.05 K/UL (ref 0–0.58)
IMM GRANULOCYTES NFR BLD: 1 % (ref 0–5)
LYMPHOCYTES NFR BLD: 1.91 K/UL (ref 1.5–4)
LYMPHOCYTES RELATIVE PERCENT: 21 % (ref 20–42)
MAGNESIUM SERPL-MCNC: 1.8 MG/DL (ref 1.6–2.6)
MCH RBC QN AUTO: 27.2 PG (ref 26–35)
MCHC RBC AUTO-ENTMCNC: 31.8 G/DL (ref 32–34.5)
MCV RBC AUTO: 85.5 FL (ref 80–99.9)
MONOCYTES NFR BLD: 1.07 K/UL (ref 0.1–0.95)
MONOCYTES NFR BLD: 12 % (ref 2–12)
NEUTROPHILS NFR BLD: 62 % (ref 43–80)
NEUTS SEG NFR BLD: 5.63 K/UL (ref 1.8–7.3)
PHOSPHATE SERPL-MCNC: 3 MG/DL (ref 2.5–4.5)
PLATELET # BLD AUTO: 399 K/UL (ref 130–450)
PMV BLD AUTO: 11.4 FL (ref 7–12)
POTASSIUM SERPL-SCNC: 3.9 MMOL/L (ref 3.5–5)
RBC # BLD AUTO: 4.27 M/UL (ref 3.8–5.8)
SODIUM SERPL-SCNC: 140 MMOL/L (ref 132–146)
TROPONIN I SERPL HS-MCNC: 33 NG/L (ref 0–11)
WBC OTHER # BLD: 9.1 K/UL (ref 4.5–11.5)

## 2024-05-04 PROCEDURE — 6360000002 HC RX W HCPCS: Performed by: STUDENT IN AN ORGANIZED HEALTH CARE EDUCATION/TRAINING PROGRAM

## 2024-05-04 PROCEDURE — 6370000000 HC RX 637 (ALT 250 FOR IP): Performed by: INTERNAL MEDICINE

## 2024-05-04 PROCEDURE — 6370000000 HC RX 637 (ALT 250 FOR IP)

## 2024-05-04 PROCEDURE — 2500000003 HC RX 250 WO HCPCS

## 2024-05-04 PROCEDURE — 2580000003 HC RX 258

## 2024-05-04 PROCEDURE — 83735 ASSAY OF MAGNESIUM: CPT

## 2024-05-04 PROCEDURE — 85025 COMPLETE CBC W/AUTO DIFF WBC: CPT

## 2024-05-04 PROCEDURE — 80048 BASIC METABOLIC PNL TOTAL CA: CPT

## 2024-05-04 PROCEDURE — 99232 SBSQ HOSP IP/OBS MODERATE 35: CPT | Performed by: INTERNAL MEDICINE

## 2024-05-04 PROCEDURE — 84100 ASSAY OF PHOSPHORUS: CPT

## 2024-05-04 PROCEDURE — 36415 COLL VENOUS BLD VENIPUNCTURE: CPT

## 2024-05-04 PROCEDURE — 92526 ORAL FUNCTION THERAPY: CPT

## 2024-05-04 PROCEDURE — 84484 ASSAY OF TROPONIN QUANT: CPT

## 2024-05-04 PROCEDURE — 1200000000 HC SEMI PRIVATE

## 2024-05-04 RX ORDER — PANTOPRAZOLE SODIUM 40 MG/1
40 TABLET, DELAYED RELEASE ORAL
Qty: 30 TABLET | Refills: 3 | Status: CANCELLED | OUTPATIENT
Start: 2024-05-05

## 2024-05-04 RX ORDER — MECOBALAMIN 5000 MCG
5 TABLET,DISINTEGRATING ORAL NIGHTLY PRN
Qty: 30 TABLET | Refills: 0 | Status: CANCELLED | OUTPATIENT
Start: 2024-05-04 | End: 2024-06-03

## 2024-05-04 RX ORDER — ENOXAPARIN SODIUM 100 MG/ML
1 INJECTION SUBCUTANEOUS 2 TIMES DAILY
Qty: 0.8 ML | Refills: 2 | Status: CANCELLED | OUTPATIENT
Start: 2024-05-04

## 2024-05-04 RX ORDER — SUCRALFATE 1 G/1
1 TABLET ORAL 4 TIMES DAILY PRN
Status: DISCONTINUED | OUTPATIENT
Start: 2024-05-04 | End: 2024-05-06 | Stop reason: HOSPADM

## 2024-05-04 RX ORDER — TAMSULOSIN HYDROCHLORIDE 0.4 MG/1
0.4 CAPSULE ORAL DAILY
Qty: 30 CAPSULE | Refills: 3 | Status: CANCELLED | OUTPATIENT
Start: 2024-05-04

## 2024-05-04 RX ORDER — PROCHLORPERAZINE MALEATE 10 MG
5 TABLET ORAL EVERY 6 HOURS PRN
Status: DISCONTINUED | OUTPATIENT
Start: 2024-05-04 | End: 2024-05-06 | Stop reason: HOSPADM

## 2024-05-04 RX ORDER — MECOBALAMIN 5000 MCG
5 TABLET,DISINTEGRATING ORAL NIGHTLY PRN
Status: CANCELLED | DISCHARGE
Start: 2024-05-04

## 2024-05-04 RX ORDER — MAGNESIUM SULFATE HEPTAHYDRATE 40 MG/ML
2000 INJECTION, SOLUTION INTRAVENOUS ONCE
Status: COMPLETED | OUTPATIENT
Start: 2024-05-04 | End: 2024-05-04

## 2024-05-04 RX ORDER — CALCIUM CARBONATE 500 MG/1
500 TABLET, CHEWABLE ORAL 3 TIMES DAILY PRN
Status: DISCONTINUED | OUTPATIENT
Start: 2024-05-04 | End: 2024-05-06 | Stop reason: HOSPADM

## 2024-05-04 RX ADMIN — CALCIUM CARBONATE 500 MG: 500 TABLET, CHEWABLE ORAL at 15:59

## 2024-05-04 RX ADMIN — ENOXAPARIN SODIUM 80 MG: 100 INJECTION SUBCUTANEOUS at 20:36

## 2024-05-04 RX ADMIN — ENOXAPARIN SODIUM 80 MG: 100 INJECTION SUBCUTANEOUS at 08:18

## 2024-05-04 RX ADMIN — ONDANSETRON 4 MG: 4 TABLET, ORALLY DISINTEGRATING ORAL at 21:30

## 2024-05-04 RX ADMIN — PANTOPRAZOLE SODIUM 40 MG: 40 TABLET, DELAYED RELEASE ORAL at 06:08

## 2024-05-04 RX ADMIN — PETROLATUM: 420 OINTMENT TOPICAL at 08:24

## 2024-05-04 RX ADMIN — Medication 5 MG: at 20:45

## 2024-05-04 RX ADMIN — TAMSULOSIN HYDROCHLORIDE 0.4 MG: 0.4 CAPSULE ORAL at 08:18

## 2024-05-04 RX ADMIN — ALUMINUM HYDROXIDE, MAGNESIUM HYDROXIDE, AND SIMETHICONE: 1200; 120; 1200 SUSPENSION ORAL at 11:31

## 2024-05-04 RX ADMIN — SODIUM CHLORIDE, PRESERVATIVE FREE 10 ML: 5 INJECTION INTRAVENOUS at 22:29

## 2024-05-04 RX ADMIN — SODIUM CHLORIDE, PRESERVATIVE FREE 10 ML: 5 INJECTION INTRAVENOUS at 08:18

## 2024-05-04 RX ADMIN — MAGNESIUM SULFATE HEPTAHYDRATE 2000 MG: 40 INJECTION, SOLUTION INTRAVENOUS at 06:13

## 2024-05-04 RX ADMIN — PETROLATUM: 420 OINTMENT TOPICAL at 22:29

## 2024-05-04 ASSESSMENT — PAIN DESCRIPTION - DESCRIPTORS: DESCRIPTORS: ACHING

## 2024-05-04 ASSESSMENT — PAIN DESCRIPTION - ORIENTATION: ORIENTATION: MID

## 2024-05-04 ASSESSMENT — PAIN SCALES - GENERAL: PAINLEVEL_OUTOF10: 5

## 2024-05-04 ASSESSMENT — PAIN DESCRIPTION - LOCATION: LOCATION: ABDOMEN

## 2024-05-04 NOTE — PROGRESS NOTES
SPEECH/LANGUAGE PATHOLOGY  Clinical Re-Assessment of Swallow Function    PATIENT NAME:  Luis Seo  (male)     MRN:  [id number]    :  1950  (74 y.o.)      TODAY'S DATE:  2024    EVALUATING THERAPIST: Deidre Wall, SLP              Order received for reevaluation for diet upgrade. Patient currently being followed by SLP, therefore formal initial evaluation not completed. Patient's swallow function was reassessed. Please see below findings.     RESULTS:    DYSPHAGIA DIAGNOSIS:   Clinical indicators of functional oropharyngeal swallow for age/premorbid functioning      DIET RECOMMENDATIONS:  Regular consistency solids (IDDSI level 7) with  thin liquids (IDDSI level 0)     FEEDING RECOMMENDATIONS:     Assistance level:  No assistance needed      Compensatory strategies recommended: Fully alert for all PO and Small bites/sips      Discussed recommendations with nursing and/or faxed report to referring provider: Yes    RN cleared patient for participation in assessment     yes       PATIENT REPORT/COMPLAINT: denies difficulty swallowing  Current Diet Order:  ADULT DIET; Dysphagia - Minced and Moist  ADULT ORAL NUTRITION SUPPLEMENT; Dinner, Lunch; Frozen Oral Supplement  ADULT ORAL NUTRITION SUPPLEMENT; Breakfast, Lunch, Dinner; Standard High Calorie/High Protein Oral Supplement    PROCEDURE:  Consistencies Administered During the Evaluation   Liquids: thin liquid   Solids:  Pureed  and Hard solid      Method of Intake:   cup, straw, spoon  Self fed      Position:   Sitting in bed with head elevated above 75 degrees    CLINICAL ASSESSMENT:  Oral Stage:       The oral stage of swallowing was within functional limits for consistencies administered      Pharyngeal Stage:    No signs of aspiration were noted during this evaluation however, silent aspiration cannot be ruled out at bedside.  If silent aspiration is suspected, a Videofluoroscopic Study of Swallowing (MBS) is recommended and requires a physician  order.    Cognition:   Within functional limits for this exam    Oral Peripheral Examination   Adequate lingual/labial strength     Current Respiratory Status    room air     Parameters of Speech Production  Respiration:  Adequate for speech production  Quality:   Within functional limits  Intensity: Within functional limits    Volitional Swallow: Present     Volitional Cough:  Present     Pain: No pain reported.    46304  dysphagia tx    No further dysphagia tx warranted at this time. Please reconsult if additional concerns arise.     Deidre Wall M.S. CCC-SLP/L  Speech Language Pathologist  [id number]

## 2024-05-04 NOTE — PROGRESS NOTES
Sent a message to IM regarding his diet.  Speech did see him today and recommended a regular diet.  Ok to switch diets?

## 2024-05-04 NOTE — PROGRESS NOTES
[facility]  Internal Medicine Residency Program  Progress Note - House Team       Patient:  Luis Seo 74 y.o. male   MRN: [id number]       Date of Service: 5/4/2024    CC: Fatigue and Weakness   Overnight events: No acute overnight events     Subjective     Patient seen and examined at bedside this a.m with daughter Ranjeet. Nausea still present and was not relieved by Zofran from yesterday. Patient states that he has also been burping up the Ensure's. Typically does not drink milk at home but has been drinking milk based Ensure's in the hospital.   Ranjeet and patient are not happy with the communication with SW regarding choice of [facility]. Would like to further discuss options. Denies F/C/V/CP/SOB/Abd pain.     Objective       Physical Exam  Vitals: /86   Pulse 82   Temp 97.9 °F (36.6 °C) (Temporal)   Resp 16   Ht 1.778 m (5' 10\")   Wt 85.6 kg (188 lb 11.2 oz)   SpO2 94%   BMI 27.08 kg/m²     I & O - 24hr: I/O this shift:  In: 420 [P.O.:420]  Out: -    General Appearance: alert, appears stated age, and cooperative.  On RA  HEENT:  Head: Normal, normocephalic, atraumatic.  Neck: no carotid bruit and supple, symmetrical, trachea midline  Lung: clear to auscultation bilaterally  Heart: tachycardia and rhythm and systolic murmur: systolic ejection 2/6, 5th intercostal space mid clavicular line    Abdomen: soft, non-tender; bowel sounds normal; no masses,  no organomegaly  Extremities:  extremities normal, atraumatic, no cyanosis or edema  Musculokeletal: 4/5 Strength in BUE, 4/5 Strength in BLE. Sensation intact. No spinous process step off deformities or pain with palpation. No joint swelling, no muscle tenderness.   Neurologic: Mental status: Alert, oriented, thought content appropriate    Diet:   ADULT DIET; Dysphagia - Minced and Moist  ADULT ORAL NUTRITION SUPPLEMENT; Dinner, Lunch; Frozen Oral Supplement  ADULT ORAL NUTRITION SUPPLEMENT; Breakfast, Lunch, Dinner; Standard High    Final Result   1. Prominent bilateral pulmonary embolism, right greater than left.  Clot   burden is high. There are findings of right ventricular strain.   2. Moderate consolidation of the posterior left lower lobe at the lung base,   probably atelectasis, but cannot exclude pneumonia.   3. Status post Whipple procedure.   4. 4.2 x 3.0 cm cystic region in the lateral portion of the residual   pancreatic head. This is probably a pseudocyst, although an abscess cannot be   entirely excluded.   5. Mucosal thickening extending from the collapsed ascending colon through   the descending colon which may be mild colitis.   6. Prominently enlarged prostate.         CT ABDOMEN PELVIS W IV CONTRAST Additional Contrast? None   Final Result   1. Prominent bilateral pulmonary embolism, right greater than left.  Clot   burden is high. There are findings of right ventricular strain.   2. Moderate consolidation of the posterior left lower lobe at the lung base,   probably atelectasis, but cannot exclude pneumonia.   3. Status post Whipple procedure.   4. 4.2 x 3.0 cm cystic region in the lateral portion of the residual   pancreatic head. This is probably a pseudocyst, although an abscess cannot be   entirely excluded.   5. Mucosal thickening extending from the collapsed ascending colon through   the descending colon which may be mild colitis.   6. Prominently enlarged prostate.         CT HEAD WO CONTRAST   Final Result   No acute intracranial abnormality.      Normal CT appearance of the head.         XR CHEST PORTABLE   Final Result   Mild increased markings identified left lung base with small left pleural   effusion. Infiltrate cannot be excluded.            EKG: Normal sinus rhythm with RBBB and Left ventricular hypertrophy, unchanged from yesterday.     Resident's Assessment and Plan       Assessment and Plan:    Intermediate-low risk bilateral massive PE, R>L w high clot burden, no R strain on Echo, elevated

## 2024-05-04 NOTE — CARE COORDINATION
5/4/24, SW spoke with RN on patient not discharging today.  Daughter and patient aware that patient is not discharging today.  [facility] been made a WILL CALL for Monday.  [facility]  SW to follow.     Doris Lucero, [facility] Case Management  [phone]

## 2024-05-04 NOTE — PLAN OF CARE
Problem: Discharge Planning  Goal: Discharge to home or other facility with appropriate resources  Outcome: Progressing     Problem: Safety - Adult  Goal: Free from fall injury  Outcome: Progressing     Problem: ABCDS Injury Assessment  Goal: Absence of physical injury  Outcome: Progressing     Problem: Skin/Tissue Integrity  Goal: Absence of new skin breakdown  Description: 1.  Monitor for areas of redness and/or skin breakdown  2.  Assess vascular access sites hourly  3.  Every 4-6 hours minimum:  Change oxygen saturation probe site  4.  Every 4-6 hours:  If on nasal continuous positive airway pressure, respiratory therapy assess nares and determine need for appliance change or resting period.  Outcome: Progressing     Problem: Nutrition Deficit:  Goal: Optimize nutritional status  Outcome: Progressing     Problem: Pain  Goal: Verbalizes/displays adequate comfort level or baseline comfort level  Outcome: Progressing

## 2024-05-04 NOTE — PROGRESS NOTES
[facility]  Department of Pulmonary, Critical Care and Sleep Medicine  Pulmonary Health & Research [place]  Department of Internal Medicine  Progress Note    SUBJECTIVE:    No CP,   SOB improved  Tolerating current oxygen therapy   Reviewed overnight events with staff.  Checked the monitors & laboratory tests.  No complaints of pain at this time.     OBJECTIVE:  Vitals:    05/04/24 0548 05/04/24 0746 05/04/24 1123 05/04/24 1505   BP:  (!) 155/79 129/86 132/88   Pulse:  (!) 101 82 (!) 111   Resp:  16 16 16   Temp:  97.8 °F (36.6 °C) 97.9 °F (36.6 °C) 97.6 °F (36.4 °C)   TempSrc:  Temporal Temporal Temporal   SpO2:  95% 94% 95%   Weight: 85.6 kg (188 lb 11.2 oz)      Height:         Constitutional: Alert,     EENT: EOMI DIXIE. MMM. No icterus. No thrush.     Neck: No thyromegaly. No elevated JVP. Trachea was midline.   Respiratory: Symmetrical.  Breath sounds were clear.    Cardiovascular: Regular, No murmur. No rubs.      Pulses:  Equal bilaterally.    Abdomen: Soft without organomegaly. No rebound, rigidity.  No guarding.  Lymphatic: No lymphadenopathy.  Musculoskeletal: Without weakness or gross deficits  Extremities:  No lower extremity edema. Reflexes appear adequate.   Skin:  Warm and dry.  No skin rashes.   Neurological/Psychiatric: No acute psychosis. Cranial nerves are intact.        DATA:  The data collected below information that was obtained, reviewed, analyzed and interpreted today. Imaging test are reviewed with the radiologist during weekly conference rounds. Comparison to previous images are always explored.     Monitor Strips:  My interpretation and reviewed of the cardiac monitor and further discussion with technical team reveals no changes noted and the patient is in sinus rhythm     RADIOLOGY:  My interpretation and review of the current films reveals  pe      CBC:   Lab Results   Component Value Date/Time    WBC 9.1 05/04/2024 03:39 AM    RBC 4.27 05/04/2024

## 2024-05-04 NOTE — DISCHARGE INSTRUCTIONS
Internal medicine    Follow ups  Please follow up with your primary care physician ( Dr. Oneill) within 10 days of discharge from facility. Please contact the internal medicine clinic for an appointment if you are unable to get an appointment with your PCP.   Please make an appointment with pulmonologist Dr. Mishra for follow-up  Please keep all other follow up appointments:  [facility] on 5/24 at 1:30pm with Dr. Toledo     Changes in healthcare   Please take all medications as indicated  Diet:  diabetic diet    Activity: activity as tolerated  New Medications started during this hospital stay  Enoxaparin (Lovenox): inject 0.8 mLs into the skin 2 times daily  Pantoprazole (Protonix) 40 mg: take 1 tablet by mouth every morning before breakfast  Melatonin 3 mg: take 1 tablet by mouth nightly as needed for sleep  Medications you should stop taking   Stop taking insulin glargine (lantus), keep a record of your home blood sugars for your follow up with your PCP Dr. Oneill  Stop taking quetiapine    Please contact us if you have any concerns, wish to change or make an appointment:  Internal medicine clinic   Phone: [phone]  Fax: [phone] [address] [address]  Or please call the nurse line at [phone].  Should you have further questions in regards to this visit, you can review your clinical note and after visit summary document on your [employer] account.  Other questions can be directed to our nurse line at [phone].     Other than any new prescriptions given to you today, the list of home medications on this After Visit Summary are based on information provided to us from you and your healthcare providers. This information, including the list, dose, and frequency of medications is only as accurate as the information you provided. If you have any questions or concerns about your home medications, please contact your Primary Care Physician for further clarification.

## 2024-05-05 LAB
ANION GAP SERPL CALCULATED.3IONS-SCNC: 13 MMOL/L (ref 7–16)
BASOPHILS # BLD: 0.07 K/UL (ref 0–0.2)
BASOPHILS NFR BLD: 1 % (ref 0–2)
BUN SERPL-MCNC: 7 MG/DL (ref 6–23)
CALCIUM SERPL-MCNC: 9.8 MG/DL (ref 8.6–10.2)
CHLORIDE SERPL-SCNC: 104 MMOL/L (ref 98–107)
CO2 SERPL-SCNC: 24 MMOL/L (ref 22–29)
CREAT SERPL-MCNC: 0.7 MG/DL (ref 0.7–1.2)
EOSINOPHIL # BLD: 0.18 K/UL (ref 0.05–0.5)
EOSINOPHILS RELATIVE PERCENT: 2 % (ref 0–6)
ERYTHROCYTE [DISTWIDTH] IN BLOOD BY AUTOMATED COUNT: 15.7 % (ref 11.5–15)
GFR, ESTIMATED: >90 ML/MIN/1.73M2
GLUCOSE SERPL-MCNC: 138 MG/DL (ref 74–99)
HCT VFR BLD AUTO: 36.5 % (ref 37–54)
HGB BLD-MCNC: 11.7 G/DL (ref 12.5–16.5)
IMM GRANULOCYTES # BLD AUTO: 0.05 K/UL (ref 0–0.58)
IMM GRANULOCYTES NFR BLD: 1 % (ref 0–5)
LYMPHOCYTES NFR BLD: 1.83 K/UL (ref 1.5–4)
LYMPHOCYTES RELATIVE PERCENT: 22 % (ref 20–42)
MAGNESIUM SERPL-MCNC: 2 MG/DL (ref 1.6–2.6)
MCH RBC QN AUTO: 27.5 PG (ref 26–35)
MCHC RBC AUTO-ENTMCNC: 32.1 G/DL (ref 32–34.5)
MCV RBC AUTO: 85.9 FL (ref 80–99.9)
MONOCYTES NFR BLD: 1.23 K/UL (ref 0.1–0.95)
MONOCYTES NFR BLD: 15 % (ref 2–12)
NEUTROPHILS NFR BLD: 60 % (ref 43–80)
NEUTS SEG NFR BLD: 5.01 K/UL (ref 1.8–7.3)
PHOSPHATE SERPL-MCNC: 3.5 MG/DL (ref 2.5–4.5)
PLATELET # BLD AUTO: 310 K/UL (ref 130–450)
PMV BLD AUTO: 11.3 FL (ref 7–12)
POTASSIUM SERPL-SCNC: 4 MMOL/L (ref 3.5–5)
RBC # BLD AUTO: 4.25 M/UL (ref 3.8–5.8)
SODIUM SERPL-SCNC: 141 MMOL/L (ref 132–146)
WBC OTHER # BLD: 8.4 K/UL (ref 4.5–11.5)

## 2024-05-05 PROCEDURE — 97530 THERAPEUTIC ACTIVITIES: CPT

## 2024-05-05 PROCEDURE — 97535 SELF CARE MNGMENT TRAINING: CPT

## 2024-05-05 PROCEDURE — 80048 BASIC METABOLIC PNL TOTAL CA: CPT

## 2024-05-05 PROCEDURE — 2580000003 HC RX 258

## 2024-05-05 PROCEDURE — 1200000000 HC SEMI PRIVATE

## 2024-05-05 PROCEDURE — 99231 SBSQ HOSP IP/OBS SF/LOW 25: CPT | Performed by: INTERNAL MEDICINE

## 2024-05-05 PROCEDURE — 85025 COMPLETE CBC W/AUTO DIFF WBC: CPT

## 2024-05-05 PROCEDURE — 6370000000 HC RX 637 (ALT 250 FOR IP)

## 2024-05-05 PROCEDURE — 36415 COLL VENOUS BLD VENIPUNCTURE: CPT

## 2024-05-05 PROCEDURE — 84100 ASSAY OF PHOSPHORUS: CPT

## 2024-05-05 PROCEDURE — 99232 SBSQ HOSP IP/OBS MODERATE 35: CPT | Performed by: INTERNAL MEDICINE

## 2024-05-05 PROCEDURE — 6360000002 HC RX W HCPCS: Performed by: STUDENT IN AN ORGANIZED HEALTH CARE EDUCATION/TRAINING PROGRAM

## 2024-05-05 PROCEDURE — 83735 ASSAY OF MAGNESIUM: CPT

## 2024-05-05 PROCEDURE — 6370000000 HC RX 637 (ALT 250 FOR IP): Performed by: INTERNAL MEDICINE

## 2024-05-05 RX ORDER — MECOBALAMIN 5000 MCG
10 TABLET,DISINTEGRATING ORAL NIGHTLY PRN
Status: DISCONTINUED | OUTPATIENT
Start: 2024-05-05 | End: 2024-05-06 | Stop reason: HOSPADM

## 2024-05-05 RX ADMIN — Medication 10 MG: at 21:42

## 2024-05-05 RX ADMIN — ONDANSETRON 4 MG: 4 TABLET, ORALLY DISINTEGRATING ORAL at 20:54

## 2024-05-05 RX ADMIN — TAMSULOSIN HYDROCHLORIDE 0.4 MG: 0.4 CAPSULE ORAL at 09:54

## 2024-05-05 RX ADMIN — PANTOPRAZOLE SODIUM 40 MG: 40 TABLET, DELAYED RELEASE ORAL at 05:15

## 2024-05-05 RX ADMIN — ENOXAPARIN SODIUM 80 MG: 100 INJECTION SUBCUTANEOUS at 20:54

## 2024-05-05 RX ADMIN — ENOXAPARIN SODIUM 80 MG: 100 INJECTION SUBCUTANEOUS at 09:53

## 2024-05-05 RX ADMIN — PETROLATUM: 420 OINTMENT TOPICAL at 09:57

## 2024-05-05 RX ADMIN — CALCIUM CARBONATE 500 MG: 500 TABLET, CHEWABLE ORAL at 20:54

## 2024-05-05 RX ADMIN — SODIUM CHLORIDE, PRESERVATIVE FREE 10 ML: 5 INJECTION INTRAVENOUS at 09:53

## 2024-05-05 RX ADMIN — CALCIUM CARBONATE 500 MG: 500 TABLET, CHEWABLE ORAL at 12:01

## 2024-05-05 RX ADMIN — SODIUM CHLORIDE, PRESERVATIVE FREE 10 ML: 5 INJECTION INTRAVENOUS at 21:09

## 2024-05-05 ASSESSMENT — PAIN SCALES - GENERAL
PAINLEVEL_OUTOF10: 0
PAINLEVEL_OUTOF10: 0

## 2024-05-05 NOTE — PROGRESS NOTES
[facility]  Department of Pulmonary, Critical Care and Sleep Medicine  Pulmonary Health & Research [place]  Department of Internal Medicine  Progress Note    SUBJECTIVE:    strength improving, ambulating to the bathroom alone without difficulties. Denies F/C/V/CP/SOB/Abd pain.     OBJECTIVE:  Vitals:    05/05/24 0501 05/05/24 0740 05/05/24 1110 05/05/24 1510   BP: (!) 145/96 (!) 131/90 135/87 (!) 128/90   Pulse: 99 84 96 98   Resp: 18 18 18 18   Temp: 98.2 °F (36.8 °C) 97.8 °F (36.6 °C) 97.8 °F (36.6 °C) 97.8 °F (36.6 °C)   TempSrc:  Temporal Temporal Temporal   SpO2: 98% 94% 94% 94%   Weight:       Height:         Constitutional: Alert,     EENT: EOMI DIXIE. MMM. No icterus. No thrush.     Neck: No thyromegaly. No elevated JVP. Trachea was midline.   Respiratory: Symmetrical.  Breath sounds were clear.    Cardiovascular: Regular, No murmur. No rubs.      Pulses:  Equal bilaterally.    Abdomen: Soft without organomegaly. No rebound, rigidity.  No guarding.  Lymphatic: No lymphadenopathy.  Musculoskeletal: Without weakness or gross deficits  Extremities:  No lower extremity edema. Reflexes appear adequate.   Skin:  Warm and dry.  No skin rashes.   Neurological/Psychiatric: No acute psychosis. Cranial nerves are intact.        DATA:  The data collected below information that was obtained, reviewed, analyzed and interpreted today. Imaging test are reviewed with the radiologist during weekly conference rounds. Comparison to previous images are always explored.     Monitor Strips:  My interpretation and reviewed of the cardiac monitor and further discussion with technical team reveals no changes noted and the patient is in sinus rhythm     RADIOLOGY:  My interpretation and review of the current films reveals  pe      CBC:   Lab Results   Component Value Date/Time    WBC 8.4 05/05/2024 05:39 AM    RBC 4.25 05/05/2024 05:39 AM    HGB 11.7 05/05/2024 05:39 AM    HCT 36.5 05/05/2024  05:39 AM    MCV 85.9 05/05/2024 05:39 AM    MCH 27.5 05/05/2024 05:39 AM    MCHC 32.1 05/05/2024 05:39 AM    RDW 15.7 05/05/2024 05:39 AM     05/05/2024 05:39 AM    MPV 11.3 05/05/2024 05:39 AM     BMP:    Lab Results   Component Value Date/Time     05/05/2024 05:39 AM    K 4.0 05/05/2024 05:39 AM     05/05/2024 05:39 AM    CO2 24 05/05/2024 05:39 AM    BUN 7 05/05/2024 05:39 AM    CREATININE 0.7 05/05/2024 05:39 AM    CALCIUM 9.8 05/05/2024 05:39 AM    LABGLOM >90 05/05/2024 05:39 AM    LABGLOM >90 04/30/2024 03:12 AM    GLUCOSE 138 05/05/2024 05:39 AM    GLUCOSE 87 11/24/2010 11:00 AM       CLINICAL ASSESMENT:  BGS   Cancer  Intermediate-low risk bilateral massive PE, R>L w high clot burden, no R strain on Echo, elevated biomarkers, on Lovenox BID   Elevated troponin 2/2 #1, 76-76, stable  Elevated BNP 2/2 #1, 1044, stable  LLL consolidation likely atelectasis   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, s/p steroids at [facility], deferred IVIG  BLE weakness 2/2 likely GBS, resolved  BUE weakness 2/2 likely GBS, improving  Pancreatic adenocarcinoma s/p body and tail partial resection, [facility]   RCC s/p microwave ablation, [facility]   HTN   PAF, previously on Xarelto OP (off for ~9 weeks 2/2 #5 procedure), now on therapeutic Lovenox   BPH, on flomax OP  Dysphagia, liquid diet OP for last ~1 month, now on minced and moist diet, SLP following    PLAN: If needed the case was discussed with the care team  Lovenox BID 1mg/kg  PT OT and await Acute Rehab  Check NIF and VC for baseline       Suman Mishra DO, MPH, FCCP, MACOI, FACP  Professor of Internal Medicine

## 2024-05-05 NOTE — PLAN OF CARE
Problem: Discharge Planning  Goal: Discharge to home or other facility with appropriate resources  Outcome: Progressing     Problem: Safety - Adult  Goal: Free from fall injury  Outcome: Progressing     Problem: ABCDS Injury Assessment  Goal: Absence of physical injury  Outcome: Progressing     Problem: Nutrition Deficit:  Goal: Optimize nutritional status  Outcome: Progressing     Problem: Pain  Goal: Verbalizes/displays adequate comfort level or baseline comfort level  Outcome: Progressing

## 2024-05-05 NOTE — PROGRESS NOTES
Physical Therapy  Physical Therapy Treatment    Name: Lusi Seo  : 1950  MRN: [id number]      Date of Service: 2024    Evaluating PT:  Brad Skelton, PT, DPT [id number]    Room #:  5209/5209-A  Diagnosis:  Acute pulmonary embolism, unspecified pulmonary embolism type, unspecified whether acute cor pulmonale present (HCC) [I26.99]  PMHx/PSHx:   has no past medical history on file.   has no past surgical history on file.  Procedure/Surgery:  None  Precautions:  Falls, Equipment Owned:  FWW, cane, wheelchair  Equipment Needs:  TBD    SUBJECTIVE:    Pt lives alone in a 1 story home with 0 stair(s) to enter.  Family has been staying with patient .  Pt stated that he last ambulated ~1 month ago;  has been staying in bed;  sat up on EOB \"once in a while.\"    OBJECTIVE:   Initial Evaluation  Date: 2024 Treatment  Date: 24 Short Term/ Long Term   Goals   AM-PAC 6 Clicks     Was pt agreeable to Eval/treatment? Yes Yes     Does pt have pain? No No    Bed Mobility  Rolling: NT  Supine to sit: ModA  Sit to supine: ModA  Scooting: Karuna (seated) Rolling: NT  Supine to sit: Karuna  Sit to supine: NT  Scooting: Karuna (seated) Rolling: Independent  Supine to sit: Independent  Sit to supine: Independent  Scooting: Independent   Transfers Sit to stand: ModA  Stand to sit: ModA  Stand pivot: NT Sit to stand: Karuna with FWW  Stand to sit: Karuna  Stand pivot: Karuna with FWW Sit to stand: Mod I  Stand to sit: Mod I  Stand pivot: Mod I with AAD   Ambulation    3 feet (side steps) with WW ModA    15 feet with WW Karuna 50 feet x2 with WW and Karuna 100 feet with AAD Mod I   Stair negotiation: ascended and descended  NT NT TBD   ROM BUE:  See OT note  BLE:  WFL     Strength BUE:  See OT note  BLE:  Grossly 5/5     Balance Sitting EOB:  Supervision  Dynamic Standing:  ModA <> Karuna with WW Sitting EOB:  Supervision  Dynamic Standing:  Karuna with WW Sitting EOB:  Independent  Dynamic Standing:  Mod I with AAD     Pt  is A & O x 4  Sensation:  Pt denies numbness and tingling to extremities  Edema:  Unremarkable       Patient education  Pt educated on role of PT in acute setting, benefits of upright mobility, use of call light in room, AD proximity.    Patient response to education:   Pt verbalized understanding Pt demonstrated skill Pt requires further education in this area   Yes Partially  Reinforce     ASSESSMENT:    Comments:  Pt was supine in bed upon therapist arrival, agreeable to session. Pt reported increased fatigue but was cooperative. Patient participated in mobility as documented above. Deficits observed this date include slow gait speed, reduced endurance, BLE fatigue, forward flexed posture, and reduced safety awareness. Various dynamic balance activities completed in room, including multi-directional turns and negotiation of obstacles, during which no overt LOB was observed. After session, patient was assisted into chair for breakfast and was left upright with all needs met, questions answered, and call light within reach.     Treatment:  Patient practiced and was instructed in the following treatment:    Bed mobility - Cues provided for safety, sequencing, physical assist provided as needed.  Transfers - Cues provided for safety, hand/feet placement, physical assist provided as needed.  Ambulation - Cues provided for posture, safety, physical assist provided as needed.  Skilled positioning - To prevent skin breakdown and contractures.  Skilled assessment of vitals.  Education - Provided for safety, role of PT in acute setting, benefits of upright mobility.    PLAN:    Patient is making Good progress towards established goals.  Will continue with current POC.      Time in  0740  Time out  0755    Total Treatment Time  15 minutes     CPT codes:  [] Gait training 93313 0 minutes  [] Manual therapy 39307 0 minutes  [x] Therapeutic activities 60074 15 minutes  [] Therapeutic exercises 66474 0 minutes  [] Neuromuscular

## 2024-05-05 NOTE — PROGRESS NOTES
[facility]  Internal Medicine Residency Program  Progress Note - House Team       Patient:  Luis Seo 74 y.o. male   MRN: [id number]       Date of Service: 5/5/2024    CC: Fatigue and Weakness   Overnight events: No acute overnight events     Subjective     Patient seen and examined at bedside this A.M. Nausea improved from yesterday. Was able to eat outside food without difficulties. States that he feels like he would tolerate home food better than hospital food. Had some difficulty sleeping overnight due to frequent urination. Would like to speak to  as he has changed his mind about the facility that he would like to go to. Otherwise strength improving, ambulating to the bathroom alone without difficulties. Denies F/C/V/CP/SOB/Abd pain.     Objective       Physical Exam  Vitals: /87   Pulse 96   Temp 97.8 °F (36.6 °C) (Temporal)   Resp 18   Ht 1.778 m (5' 10\")   Wt 85.6 kg (188 lb 11.2 oz)   SpO2 94%   BMI 27.08 kg/m²     I & O - 24hr: I/O this shift:  In: 180 [P.O.:180]  Out: -    General Appearance: alert, appears stated age, and cooperative.  On RA  HEENT:  Head: Normal, normocephalic, atraumatic.  Neck: no carotid bruit and supple, symmetrical, trachea midline  Lung: clear to auscultation bilaterally  Heart: tachycardia and rhythm and systolic murmur: systolic ejection 2/6, 5th intercostal space mid clavicular line    Abdomen: soft, non-tender; bowel sounds normal; no masses,  no organomegaly  Extremities:  extremities normal, atraumatic, no cyanosis or edema  Musculokeletal: 4/5 Strength in BUE, 4/5 Strength in BLE. Sensation intact. No spinous process step off deformities or pain with palpation. No joint swelling, no muscle tenderness.   Neurologic: Mental status: Alert, oriented, thought content appropriate    Diet:   ADULT ORAL NUTRITION SUPPLEMENT; Dinner, Lunch; Frozen Oral Supplement  ADULT ORAL NUTRITION SUPPLEMENT; Breakfast, Lunch, Dinner; Standard High  THORACIC SPINE W CONTRAST   Final Result   1. No sign of pathologic cord or osseous enhancement in the thoracic spine..   2. No spinal canal stenosis or neural foraminal narrowing of the thoracic   spine.         MRI CERVICAL SPINE WITH CONTRAST   Final Result   No pathologic enhancement of the cervical spine.         MRI CERVICAL SPINE WO CONTRAST   Final Result   1. Mild disc herniations are present at multiple levels notable at C4/C5   resulting in minimal effacement of the ventral cord.  No significant central   canal stenosis.   2. Multiple levels of mild-to-moderate neural foraminal narrowing as   described above.   3. Multiple levels of disc space narrowing.         MRI THORACIC SPINE WO CONTRAST   Final Result   MRI thoracic spine:      No fracture or destructive osseous lesion.      Degenerative disc disease as described above.  No spinal canal or foraminal   stenosis in the thoracic spine.      MRI lumbar spine:      No acute fracture or destructive osseous lesion.      Degenerative disc disease as described above, exacerbating congenitally   narrow lumbar spinal canal.      Spinal canal narrowing, moderate at L4-5.      Foraminal narrowing, moderate at right L2-3 and left L3-4, mild-to-moderate   at left L4-5.         MRI LUMBAR SPINE WO CONTRAST   Final Result   MRI thoracic spine:      No fracture or destructive osseous lesion.      Degenerative disc disease as described above.  No spinal canal or foraminal   stenosis in the thoracic spine.      MRI lumbar spine:      No acute fracture or destructive osseous lesion.      Degenerative disc disease as described above, exacerbating congenitally   narrow lumbar spinal canal.      Spinal canal narrowing, moderate at L4-5.      Foraminal narrowing, moderate at right L2-3 and left L3-4, mild-to-moderate   at left L4-5.         MRI BRAIN WO CONTRAST   Final Result   No acute intracranial abnormality.      Mild chronic microvascular disease.         CTA

## 2024-05-05 NOTE — PLAN OF CARE
Problem: Discharge Planning  Goal: Discharge to home or other facility with appropriate resources  5/5/2024 1151 by Mikki Richard RN  Outcome: Progressing  5/5/2024 0335 by Doris Enamorado RN  Outcome: Progressing     Problem: Safety - Adult  Goal: Free from fall injury  5/5/2024 1151 by Mikki Richard RN  Outcome: Progressing  5/5/2024 0335 by Doris Enamorado RN  Outcome: Progressing     Problem: ABCDS Injury Assessment  Goal: Absence of physical injury  5/5/2024 1151 by Mikki Richard RN  Outcome: Progressing  5/5/2024 0335 by Doris Enamorado RN  Outcome: Progressing     Problem: Skin/Tissue Integrity  Goal: Absence of new skin breakdown  Description: 1.  Monitor for areas of redness and/or skin breakdown  2.  Assess vascular access sites hourly  3.  Every 4-6 hours minimum:  Change oxygen saturation probe site  4.  Every 4-6 hours:  If on nasal continuous positive airway pressure, respiratory therapy assess nares and determine need for appliance change or resting period.  Outcome: Progressing     Problem: Nutrition Deficit:  Goal: Optimize nutritional status  5/5/2024 1151 by Mikki Richard RN  Outcome: Progressing  5/5/2024 0335 by Doris Enamorado RN  Outcome: Progressing     Problem: Pain  Goal: Verbalizes/displays adequate comfort level or baseline comfort level  5/5/2024 1151 by Mikki Richard RN  Outcome: Progressing  5/5/2024 0335 by Doris Enamorado RN  Outcome: Progressing

## 2024-05-05 NOTE — PROGRESS NOTES
Occupational Therapy  OT BEDSIDE TREATMENT NOTE   [facility]  [address], [place]        Date:2024  Patient Name: Luis Seo  MRN: [id number]  : 1950  Room: [address] [place] [address]     Per OT Eval:    Evaluating OT: Leslie Hansen OTR/L; [id number]        Referring Provider: Anais Kennedy MD     Specific Provider Orders/Date: OT Eval and Treat 24        Diagnosis: Acute pulmonary embolism, unspecified pulmonary embolism type, unspecified whether acute cor pulmonale present      Surgery: None this admission     Pertinent Medical History:  has no past medical history on file.      Recommended Adaptive Equipment: TBD      Precautions:  Fall Risk, h/o pancreatic CA,     Assessment of current deficits    [x] Functional mobility            [x]ADLs           [x] Strength                  []Cognition    [x] Functional transfers          [x] IADLs         [x] Safety Awareness   [x]Endurance    [x] Fine Coordination                         [x] Balance      [] Vision/perception   []Sensation      []Gross Motor Coordination             [] ROM           [] Delirium                   [] Motor Control      OT PLAN OF CARE   OT POC based on physician orders, patient diagnosis and results of clinical assessment     Frequency/Duration 1-3 days/wk for 2 weeks PRN   Specific OT Treatment Interventions to include:   * Instruction/training on adapted ADL techniques and AE recommendations to increase functional independence within precautions       * Training on energy conservation strategies, correct breathing pattern and techniques to improve independence/tolerance for self-care routine  * Functional transfer/mobility training/DME recommendations for increased independence, safety, and fall prevention  * Patient/Family education to increase follow through with safety techniques and functional independence  * Recommendation of environmental modifications for  of ADL/IADL tasks for functional independence and quality of life.    At end of session pt left seated in bedside chair, call light within reach.     Pt has made fair- progress towards set goals.     Continue with current plan of care    Treatment Time In:0740            Treatment Time Out: 0803             Treatment Charges: Mins Units   Ther Ex  87526     Manual Therapy 17654     Thera Activities 15580 10 1   ADL/Home Mgt 96484 13 1   Neuro Re-ed 79603     Group Therapy      Orthotic manage/training  73082     Non-Billable Time     Total Timed Treatment 23 2     Josie Rulf BORJA/L [id number]

## 2024-05-06 VITALS
HEART RATE: 70 BPM | OXYGEN SATURATION: 93 % | DIASTOLIC BLOOD PRESSURE: 94 MMHG | BODY MASS INDEX: 27.01 KG/M2 | RESPIRATION RATE: 18 BRPM | TEMPERATURE: 98.3 F | WEIGHT: 188.7 LBS | HEIGHT: 70 IN | SYSTOLIC BLOOD PRESSURE: 129 MMHG

## 2024-05-06 LAB
ANION GAP SERPL CALCULATED.3IONS-SCNC: 12 MMOL/L (ref 7–16)
BASOPHILS # BLD: 0.09 K/UL (ref 0–0.2)
BASOPHILS NFR BLD: 1 % (ref 0–2)
BUN SERPL-MCNC: 6 MG/DL (ref 6–23)
CALCIUM SERPL-MCNC: 10.1 MG/DL (ref 8.6–10.2)
CHLORIDE SERPL-SCNC: 104 MMOL/L (ref 98–107)
CO2 SERPL-SCNC: 25 MMOL/L (ref 22–29)
CREAT SERPL-MCNC: 0.7 MG/DL (ref 0.7–1.2)
EKG ATRIAL RATE: 85 BPM
EKG P AXIS: 29 DEGREES
EKG P-R INTERVAL: 166 MS
EKG Q-T INTERVAL: 400 MS
EKG QRS DURATION: 144 MS
EKG QTC CALCULATION (BAZETT): 476 MS
EKG R AXIS: 70 DEGREES
EKG T AXIS: 5 DEGREES
EKG VENTRICULAR RATE: 85 BPM
EOSINOPHIL # BLD: 0.28 K/UL (ref 0.05–0.5)
EOSINOPHILS RELATIVE PERCENT: 4 % (ref 0–6)
ERYTHROCYTE [DISTWIDTH] IN BLOOD BY AUTOMATED COUNT: 15.7 % (ref 11.5–15)
GFR, ESTIMATED: >90 ML/MIN/1.73M2
GLUCOSE SERPL-MCNC: 127 MG/DL (ref 74–99)
HCT VFR BLD AUTO: 36.5 % (ref 37–54)
HGB BLD-MCNC: 11.7 G/DL (ref 12.5–16.5)
IMM GRANULOCYTES # BLD AUTO: 0.04 K/UL (ref 0–0.58)
IMM GRANULOCYTES NFR BLD: 1 % (ref 0–5)
LYMPHOCYTES NFR BLD: 1.76 K/UL (ref 1.5–4)
LYMPHOCYTES RELATIVE PERCENT: 23 % (ref 20–42)
MAGNESIUM SERPL-MCNC: 2 MG/DL (ref 1.6–2.6)
MCH RBC QN AUTO: 27.5 PG (ref 26–35)
MCHC RBC AUTO-ENTMCNC: 32.1 G/DL (ref 32–34.5)
MCV RBC AUTO: 85.9 FL (ref 80–99.9)
MONOCYTES NFR BLD: 1.34 K/UL (ref 0.1–0.95)
MONOCYTES NFR BLD: 17 % (ref 2–12)
NEUTROPHILS NFR BLD: 55 % (ref 43–80)
NEUTS SEG NFR BLD: 4.27 K/UL (ref 1.8–7.3)
PHOSPHATE SERPL-MCNC: 3.4 MG/DL (ref 2.5–4.5)
PLATELET # BLD AUTO: 281 K/UL (ref 130–450)
PMV BLD AUTO: 11.8 FL (ref 7–12)
POTASSIUM SERPL-SCNC: 3.4 MMOL/L (ref 3.5–5)
RBC # BLD AUTO: 4.25 M/UL (ref 3.8–5.8)
SODIUM SERPL-SCNC: 141 MMOL/L (ref 132–146)
WBC OTHER # BLD: 7.8 K/UL (ref 4.5–11.5)

## 2024-05-06 PROCEDURE — 97530 THERAPEUTIC ACTIVITIES: CPT

## 2024-05-06 PROCEDURE — 99232 SBSQ HOSP IP/OBS MODERATE 35: CPT | Performed by: INTERNAL MEDICINE

## 2024-05-06 PROCEDURE — 36415 COLL VENOUS BLD VENIPUNCTURE: CPT

## 2024-05-06 PROCEDURE — 99238 HOSP IP/OBS DSCHRG MGMT 30/<: CPT | Performed by: INTERNAL MEDICINE

## 2024-05-06 PROCEDURE — 2580000003 HC RX 258

## 2024-05-06 PROCEDURE — 94799 UNLISTED PULMONARY SVC/PX: CPT

## 2024-05-06 PROCEDURE — 6370000000 HC RX 637 (ALT 250 FOR IP)

## 2024-05-06 PROCEDURE — 6360000002 HC RX W HCPCS: Performed by: STUDENT IN AN ORGANIZED HEALTH CARE EDUCATION/TRAINING PROGRAM

## 2024-05-06 PROCEDURE — 80048 BASIC METABOLIC PNL TOTAL CA: CPT

## 2024-05-06 PROCEDURE — 83735 ASSAY OF MAGNESIUM: CPT

## 2024-05-06 PROCEDURE — 84100 ASSAY OF PHOSPHORUS: CPT

## 2024-05-06 PROCEDURE — 85025 COMPLETE CBC W/AUTO DIFF WBC: CPT

## 2024-05-06 RX ORDER — ENOXAPARIN SODIUM 100 MG/ML
1 INJECTION SUBCUTANEOUS 2 TIMES DAILY
DISCHARGE
Start: 2024-05-06

## 2024-05-06 RX ORDER — TAMSULOSIN HYDROCHLORIDE 0.4 MG/1
0.4 CAPSULE ORAL DAILY
Qty: 30 CAPSULE | Refills: 3 | Status: CANCELLED | DISCHARGE
Start: 2024-05-06

## 2024-05-06 RX ORDER — LANOLIN ALCOHOL/MO/W.PET/CERES
3 CREAM (GRAM) TOPICAL NIGHTLY PRN
Refills: 3 | DISCHARGE
Start: 2024-05-06

## 2024-05-06 RX ORDER — TAMSULOSIN HYDROCHLORIDE 0.4 MG/1
0.4 CAPSULE ORAL DAILY
Qty: 30 CAPSULE | Refills: 3 | DISCHARGE
Start: 2024-05-07

## 2024-05-06 RX ORDER — PANTOPRAZOLE SODIUM 40 MG/1
40 TABLET, DELAYED RELEASE ORAL
Qty: 30 TABLET | Refills: 3 | DISCHARGE
Start: 2024-05-06

## 2024-05-06 RX ADMIN — SODIUM CHLORIDE, PRESERVATIVE FREE 10 ML: 5 INJECTION INTRAVENOUS at 08:34

## 2024-05-06 RX ADMIN — ACETAMINOPHEN 650 MG: 325 TABLET ORAL at 15:17

## 2024-05-06 RX ADMIN — PANTOPRAZOLE SODIUM 40 MG: 40 TABLET, DELAYED RELEASE ORAL at 07:00

## 2024-05-06 RX ADMIN — CALCIUM CARBONATE 500 MG: 500 TABLET, CHEWABLE ORAL at 11:58

## 2024-05-06 RX ADMIN — PETROLATUM: 420 OINTMENT TOPICAL at 08:35

## 2024-05-06 RX ADMIN — POTASSIUM BICARBONATE 40 MEQ: 782 TABLET, EFFERVESCENT ORAL at 08:33

## 2024-05-06 RX ADMIN — ONDANSETRON 4 MG: 4 TABLET, ORALLY DISINTEGRATING ORAL at 15:17

## 2024-05-06 RX ADMIN — ENOXAPARIN SODIUM 80 MG: 100 INJECTION SUBCUTANEOUS at 08:35

## 2024-05-06 ASSESSMENT — PAIN DESCRIPTION - ORIENTATION: ORIENTATION: MID;LOWER

## 2024-05-06 ASSESSMENT — PAIN DESCRIPTION - LOCATION: LOCATION: ABDOMEN

## 2024-05-06 ASSESSMENT — PAIN DESCRIPTION - DESCRIPTORS: DESCRIPTORS: ACHING;DISCOMFORT

## 2024-05-06 ASSESSMENT — PAIN SCALES - GENERAL: PAINLEVEL_OUTOF10: 6

## 2024-05-06 NOTE — DISCHARGE SUMMARY
[facility]  Discharge Summary    PCP: Reginald Oneill MD    Admit Date:4/26/2024  Discharge Date: 5/6/2024    Admission Diagnosis:   B/l PE, R>L w high clot burden, no R strain   Elevated HS troponin 2/2 #1  LLL consolidation, atelectasis vs pneumonia   Worsening BLE weakness, fatigue   Pancreatic Ca s/p body and tail partial resection  RCC s/p microwave ablation   HTN   PAF, previously on Xarelto OP (off for ~9 weeks 2/2 #5 procedure)   BPH  Dysphagia    Discharge Diagnosis:  Intermediate-low risk bilateral PE, R>L w high clot burden  Elevated troponin 2/2 #1, 76-76  Elevated BNP 2/2 #1, 1044  BLE weakness 2/2 GBS likely precipitated by surgery - resolved  GBS - resolved  LLL consolidation, atelectasis vs pneumonia   Pancreatic adenocarcinoma s/p body and tail partial resection  RCC s/p microwave ablation,  HTN   PAF, previously on Xarelto OP (off for ~9 weeks 2/2 #5 procedure)   BPH      Hospital Course:   Luis Seo is a 74 y.o. male with PMH of pancreatic adenocarcinoma s/p body and tail partial resection, RCC s/p microwave ablation, HTN, PAF, BPH who presented to the ED for fatigue and bilateral LE weakness. On arrival to the ED vitals were stable. CTA revealed bilateral pulmonary embolism, R>L with significant clot burden. TTE revealed no R heart strain. He was started on heparin infusion and transferred to MICU. He was bridged to warfarin in the MICU and was later transitioned to therapeutic enoxaparin after goals of care discussion with patient and family. Neurology were consulted for LE weakness; previous brain MRI showed enhancement in the bilateral internal auditory canals most consistent with GBS (treated with high-dose steroids and refused IVIG at [facility]); repeat brain MRI showed no acute intracranial abnormality and MRI of C-spine, T-spine, L-spine showed no pathologic enhancement. Throughout his hospital stay, lower extremity weakness gradually  glargine 100 UNIT/ML injection vial  Commonly known as: LANTUS     QUEtiapine 25 MG tablet  Commonly known as: SEROQUEL               Where to Get Your Medications        Information about where to get these medications is not yet available    Ask your nurse or doctor about these medications  enoxaparin 80 MG/0.8ML  pantoprazole 40 MG tablet  tamsulosin 0.4 MG capsule        Internal medicine     Follow ups  Please follow up with your primary care physician ( Dr. Oneill) within 10 days of discharge from facility. Please contact the internal medicine clinic for an appointment if you are unable to get an appointment with your PCP.   Please make an appointment with pulmonologist Dr. Mishra for follow-up  Please keep all other follow up appointments:  [facility] on 5/24 at 1:30pm with Dr. Toledo      Changes in healthcare   Please take all medications as indicated  Diet:  diabetic diet    Activity: activity as tolerated  New Medications started during this hospital stay  Enoxaparin (Lovenox): inject 0.8 mLs into the skin 2 times daily  Pantoprazole (Protonix) 40 mg: take 1 tablet by mouth every morning before breakfast  Melatonin 3 mg: take 1 tablet by mouth nightly as needed for sleep  Medications you should stop taking   Stop taking insulin glargine (lantus), keep a record of your home blood sugars for your follow up with your PCP Dr. Oneill  Stop taking quetiapine     Please contact us if you have any concerns, wish to change or make an appointment:  Internal medicine clinic   Phone: [phone]  Fax: [phone]  [facility] [address]  Or please call the nurse line at [phone].  Should you have further questions in regards to this visit, you can review your clinical note and after visit summary document on your [employer] account.  Other questions can be directed to our nurse line at [phone].      Other than any new prescriptions given to you today, the list of home medications on this After Visit

## 2024-05-06 NOTE — CARE COORDINATION
05/06/24 Update CM Note: Call placed this early am to [facility] from [facility]. Daughter refused discharge on Saturday due to wanting [facility] and getting no response on acceptance to [facility]. [facility] did accept on Friday and precert was obtained. Daughter is awaiting response from [facility]. Message sent to [facility] at [facility] to get a determination as patient is ready for discharge.Team I notified via perfect serve of need for discharge order. Electronically signed by Sloane Lawton RN CM on 5/6/2024 at 8:27 AM

## 2024-05-06 NOTE — PROGRESS NOTES
Called NA Anton to inform her that transport was delayed until 8pm. Patient's daughter is requesting to just take him to [facility] herself. Ok to take per Case Management. Patient is safe to go in personal car. [facility].

## 2024-05-06 NOTE — CARE COORDINATION
05/06/24 Update CM Note. Per Administrative CM Asst. The patients insurance is out of network with [employer]. The admissions liaison failed to tell this on Friday to the CM. She is asking for a one time agreement. Patient has been discharged and accepted at [facility] and approval has been obtained. Spoke with patient and family and they are currently discussing this. Their options are home with homecare or to [facility] for skilled therapy. Will await decision. Electronically signed by Sloane Lawton RN  on 5/6/2024 at 1:05 PM

## 2024-05-06 NOTE — CARE COORDINATION
05/06/24 Update CM Note: Patient has been accepted at [facility]. The precert will require changed with the insurance and is in process. The pcp has been notified to discharge patient. Will follow for readiness to discharge.Electronically signed by Sloane Lawton RN CM on 5/6/2024 at 11:14 AM

## 2024-05-06 NOTE — CARE COORDINATION
05/06/24 Update CM Note: Patient is going to [facility] today with [facility] transporting via stretcher with pickup time .   All questions answered. All paperwork complete and in soft chart. Daughter is at the bedside and patient is in agreement. Electronically signed by Sloane Lawotn RN CM on 5/6/2024 at 1:13 PM

## 2024-05-06 NOTE — PLAN OF CARE
Problem: Discharge Planning  Goal: Discharge to home or other facility with appropriate resources  5/6/2024 1935 by Bertha Flores, RN  Outcome: Adequate for Discharge

## 2024-05-06 NOTE — PROGRESS NOTES
[facility]  Internal Medicine Residency / House Medicine Service    Attending Physician Statement  I have discussed the case, including pertinent history and exam findings with the resident and the team.  I have seen and examined the patient and the key elements of the encounter have been performed by me.  I agree with the assessment, plan and orders as documented by the resident.      A&O  VS stable   Up in chair  Meds reviewed  On Lovenox 80 mg bid for PE  Hx of pancreatic , renal CA   Pm,an;Continue same     Remainder of medical problems as per resident note.      Maxi Weiss MD FRCP [facility]  Internal Medicine Residency Faculty

## 2024-05-06 NOTE — PROGRESS NOTES
Physical Therapy  Physical Therapy Treatment    Name: Luis Seo  : 1950  MRN: [id number]      Date of Service: 2024    Evaluating PT:  Brad Skelton, PT, DPT [id number]    Room #:  5209/5209-A  Diagnosis:  Acute pulmonary embolism, unspecified pulmonary embolism type, unspecified whether acute cor pulmonale present (HCC) [I26.99]  PMHx/PSHx:   has no past medical history on file.   has no past surgical history on file.  Procedure/Surgery:  None  Precautions:  Falls, Equipment Owned:  FWW, cane, wheelchair  Equipment Needs:  TBD    SUBJECTIVE:    Pt lives alone in a 1 story home with 0 stair(s) to enter.  Family has been staying with patient .  Pt stated that he last ambulated ~1 month ago;  has been staying in bed;  sat up on EOB \"once in a while.\"    OBJECTIVE:   Initial Evaluation  Date: 2024 Treatment  Date: 24 Short Term/ Long Term   Goals   AM-PAC 6 Clicks     Was pt agreeable to Eval/treatment? Yes Yes     Does pt have pain? No No    Bed Mobility  Rolling: NT  Supine to sit: ModA  Sit to supine: ModA  Scooting: Karuna (seated) Rolling: NT  Supine to sit: NT  Sit to supine: NT  Scooting: NT Rolling: Independent  Supine to sit: Independent  Sit to supine: Independent  Scooting: Independent   Transfers Sit to stand: ModA  Stand to sit: ModA  Stand pivot: NT Sit to stand: SBA with WW  Stand to sit: SBA  Stand pivot: Karuna with FWW Sit to stand: Mod I  Stand to sit: Mod I  Stand pivot: Mod I with AAD   Ambulation    3 feet (side steps) with WW ModA    15 feet with WW Karuna 50 feet x2 with WW and Karuna 100 feet with AAD Mod I   Stair negotiation: ascended and descended  NT NT TBD   ROM BUE:  See OT note  BLE:  WFL     Strength BUE:  See OT note  BLE:  Grossly 5/5     Balance Sitting EOB:  Supervision  Dynamic Standing:  ModA <> Karuna with WW Sitting EOB:  Supervision  Dynamic Standing:  Karuna with WW Sitting EOB:  Independent  Dynamic Standing:  Mod I with AAD     Pt is A & O x  Neuromuscular reeducation 26187  minutes    Sydney Rendon, SPT   Nancy Shaw PT, DPT  [id number]

## 2024-05-06 NOTE — PROGRESS NOTES
[facility]  Department of Pulmonary, Critical Care and Sleep Medicine  Pulmonary Health & Research [place]  Department of Internal Medicine  Progress Note    SUBJECTIVE:    strength improving, ambulating to the bathroom alone without difficulties. Denies F/C/V/CP/SOB/Abd pain.     OBJECTIVE:  Vitals:    05/05/24 1903 05/06/24 0807 05/06/24 1212 05/06/24 1638   BP: 135/83 132/81 124/80 (!) 129/94   Pulse: 89 79 98 70   Resp: 18 18 18 18   Temp: 97.8 °F (36.6 °C) 99 °F (37.2 °C) 98.3 °F (36.8 °C) 98.3 °F (36.8 °C)   TempSrc: Temporal Temporal Temporal Temporal   SpO2: 96% 95% 94% 93%   Weight:       Height:         Constitutional: Alert,     EENT: EOMI DIXIE. MMM. No icterus. No thrush.     Neck: No thyromegaly. No elevated JVP. Trachea was midline.   Respiratory: Symmetrical.  Breath sounds were clear.    Cardiovascular: Regular, No murmur. No rubs.      Pulses:  Equal bilaterally.    Abdomen: Soft without organomegaly. No rebound, rigidity.  No guarding.  Lymphatic: No lymphadenopathy.  Musculoskeletal: Without weakness or gross deficits  Extremities:  No lower extremity edema. Reflexes appear adequate.   Skin:  Warm and dry.  No skin rashes.   Neurological/Psychiatric: No acute psychosis. Cranial nerves are intact.        DATA:  The data collected below information that was obtained, reviewed, analyzed and interpreted today. Imaging test are reviewed with the radiologist during weekly conference rounds. Comparison to previous images are always explored.     Monitor Strips:  My interpretation and reviewed of the cardiac monitor and further discussion with technical team reveals no changes noted and the patient is in sinus rhythm     RADIOLOGY:  My interpretation and review of the current films reveals  pe      CBC:   Lab Results   Component Value Date/Time    WBC 7.8 05/06/2024 06:10 AM    RBC 4.25 05/06/2024 06:10 AM    HGB 11.7 05/06/2024 06:10 AM    HCT 36.5 05/06/2024 06:10

## 2024-05-09 LAB
EKG ATRIAL RATE: 83 BPM
EKG P AXIS: 55 DEGREES
EKG P-R INTERVAL: 182 MS
EKG Q-T INTERVAL: 404 MS
EKG QRS DURATION: 140 MS
EKG QTC CALCULATION (BAZETT): 474 MS
EKG R AXIS: 68 DEGREES
EKG T AXIS: -13 DEGREES
EKG VENTRICULAR RATE: 83 BPM

## 2024-05-11 NOTE — PROGRESS NOTES
Physician Progress Note      PATIENT:               MARIE BAILEY  [facility] #:                  [id number]  :                       1950  ADMIT DATE:       2024 9:53 AM  DISCH DATE:        2024 6:30 PM  RESPONDING  PROVIDER #:        SAMUEL CAMPBELL          QUERY TEXT:    Patient admitted with PE. Documentation reflects \"LLL consolidation 2/2   atelectasis vs like likely pneumonia\" in consult note.  If possible, please   document in the progress notes and discharge summary if PNA was:    The medical record reflects the following:  Risk Factors:  Clinical Indicators: CC note: LLL consolidation 2/2 atelectasis vs like likely   pneumonia. CTA chest: Moderate consolidation of the posterior left lower lobe   at the lung base, probably atelectasis, but cannot exclude pneumonia.  Treatment: 1 dose Cefepime,    Thank you,  Lisbeth Ritter RN CDS  [email or web address]  Options provided:  -- PNA confirmed after study  -- PNA ruled out after study  -- Other - I will add my own diagnosis  -- Disagree - Not applicable / Not valid  -- Disagree - Clinically unable to determine / Unknown  -- Refer to Clinical Documentation Reviewer    PROVIDER RESPONSE TEXT:    PNA ruled out after study.    Query created by: Lisbeth Ritter on 5/10/2024 10:41 AM      Electronically signed by:  SAMUEL CAMPBELL 2024 7:37 AM

## 2024-05-13 NOTE — PROGRESS NOTES
Physician Progress Note      PATIENT:               MARIE BAILEY  CSN #:                  [id number]  :                       1950  ADMIT DATE:       2024 9:53 AM  DISCH DATE:        2024 6:30 PM  RESPONDING  PROVIDER #:        Kris Blas MD          QUERY TEXT:    Patient admitted with PE. Noted documentation of acute respiratory failure in    and  PN. Per H&P: saturating well on RA initially now wearing NC for   comfort 1L 95%. SpO2 87% RA, placed on 3LNC, no mention of distress. In order   to support the diagnosis of acute respiratory failure, please include   additional clinical indicators in your documentation.  Or please document if   the diagnosis of acute respiratory failure has been ruled out after further   study.    The medical record reflects the following:  Risk Factors: PE  Clinical Indicators: Per H&P: saturating well on RA initially now wearing NC   for comfort 1L 95%...Per ED: 87% on RA denies SOB...  He was hypoxic on EMS   arrival...Lungs clear to auscultation bilaterally, no wheezes, rales, or   rhonchi. Not in respiratory distress  Treatment: 3LNC    Acute Respiratory Failure Clinical Indicators per  MS-DRG Training Guide and   Quick Reference Guide:  pO2 < 60 mmHg or SpO2 (pulse oximetry) < 91% breathing room air  pCO2 > 50 and pH < 7.35  P/F ratio (pO2 / FIO2) < 300  pO2 decrease or pCO2 increase by 10 mmHg from baseline (if known)  Supplemental oxygen of 40% or more  Presence of respiratory distress, tachypnea, dyspnea, shortness of breath,   wheezing  Unable to speak in complete sentences  Use of accessory muscles to breathe  Extreme anxiety and feeling of impending doom  Tripod position  Confusion/altered mental status/obtunded    Thank you,  KHADAR [email or web address] [email or web address]  Options provided:  -- Acute Respiratory Failure as evidenced by, Please document evidence.  -- Acute Respiratory Failure ruled out after study  -- Other - I will add my

## 2024-05-16 ENCOUNTER — OFFICE VISIT (OUTPATIENT)
Dept: PULMONOLOGY | Age: 74
End: 2024-05-16
Payer: MEDICARE

## 2024-05-16 VITALS
RESPIRATION RATE: 17 BRPM | DIASTOLIC BLOOD PRESSURE: 87 MMHG | HEART RATE: 113 BPM | BODY MASS INDEX: 26.52 KG/M2 | OXYGEN SATURATION: 96 % | TEMPERATURE: 97.3 F | HEIGHT: 68 IN | WEIGHT: 175 LBS | SYSTOLIC BLOOD PRESSURE: 112 MMHG

## 2024-05-16 DIAGNOSIS — C25.9 MALIGNANT NEOPLASM OF PANCREAS, UNSPECIFIED LOCATION OF MALIGNANCY (HCC): ICD-10-CM

## 2024-05-16 DIAGNOSIS — I26.94 MULTIPLE SUBSEGMENTAL PULMONARY EMBOLI WITHOUT ACUTE COR PULMONALE (HCC): Primary | ICD-10-CM

## 2024-05-16 LAB
DLCO %PRED: 82 %
DLCO PRED: 28.97 ML/MIN/MMHG
DLCO/VA %PRED: 106 %
DLCO/VA PRED: 4.43 ML/MIN/MMHG
DLCO/VA: 4.74 ML/MIN/MMHG
DLCO: 24.01 ML/MIN/MMHG
EXPIRATORY TIME-POST: 7.49 SEC
EXPIRATORY TIME: 7.65 SEC
FEF 25-75 %CHNG: 55
FEF 25-75 POST %PRED: 136 %
FEF 25-75% %PRED-PRE: 87 L/SEC
FEF 25-75% PRED: 2.12 L/SEC
FEF 25-75-POST: 2.88 L/SEC
FEF 25-75-PRE: 1.85 L/SEC
FEV1 %PRED-POST: 87 %
FEV1 %PRED-PRE: 78 %
FEV1 PRED: 2.83 L
FEV1-POST: 2.48 L
FEV1-PRE: 2.22 L
FEV1/FVC %PRED-POST: 103 %
FEV1/FVC %PRED-PRE: 100 %
FEV1/FVC PRED: 76 %
FEV1/FVC-POST: 79 %
FEV1/FVC-PRE: 76 %
FVC %PRED-POST: 84 L
FVC %PRED-PRE: 77 %
FVC PRED: 3.75 L
FVC-POST: 3.16 L
FVC-PRE: 2.92 L
GAW %PRED: NORMAL
GAW PRED: NORMAL
GAW: NORMAL
IC PRE %PRED: 90 %
IC PRED: 2.54 L
IC: 2.31 L
MEP: NORMAL
MIP: NORMAL
MVV %PRED-PRE: 71 %
MVV PRED: 114 L/MIN
MVV-PRE: 81 L/MIN
PEF %PRED-POST: NORMAL
PEF %PRED-PRE: NORMAL
PEF PRED: NORMAL
PEF%CHNG: NORMAL
PEF-POST: NORMAL
PEF-PRE: NORMAL
RAW %PRED: NORMAL
RAW PRED: NORMAL
RAW: NORMAL
RV PRE %PRED: 103 %
RV PRED: 2.4 L
RV: 2.48 L
SVC %PRED: 82 %
SVC PRED: 3.75 L
SVC: 3.08 L
TLC PRE %PRED: 85 %
TLC PRED: 6.54 L
TLC: 5.56 L
VA %PRED: 77 %
VA PRED: 6.54 L
VA: 5.07 L
VTG %PRED: NORMAL
VTG PRED: NORMAL
VTG: NORMAL

## 2024-05-16 PROCEDURE — 94060 EVALUATION OF WHEEZING: CPT | Performed by: INTERNAL MEDICINE

## 2024-05-16 ASSESSMENT — PULMONARY FUNCTION TESTS
FEV1_PRE: 2.22
FEV1_PERCENT_PREDICTED_PRE: 78
FEV1/FVC_PERCENT_PREDICTED_POST: 103
FVC_PRE: 2.92
FEV1/FVC_POST: 79
FVC_PREDICTED: 3.75
FEV1_PREDICTED: 2.83
FEV1/FVC_PREDICTED: 76
FEV1/FVC_PERCENT_PREDICTED_PRE: 100
FVC_PERCENT_PREDICTED_POST: 84
FEV1_POST: 2.48
FEV1_PERCENT_PREDICTED_POST: 87
FVC_POST: 3.16
FEV1/FVC_PRE: 76
FVC_PERCENT_PREDICTED_PRE: 77

## 2024-05-16 NOTE — PROGRESS NOTES
exudates or cobblestoning.  No thrush.    Neck: Supple without thyromegaly. No elevated JVP. Trachea was midline. No carotid bruits.    Respiratory: Chest was symmetrical without dullness to percussion.  Breath sounds bilaterally were clear to auscultation. No wheezes, rhonchi or rales. No intercostal retraction or use of accessory muscles. No egophony noted.   Cardiovascular: Regular without murmur, clicks, gallops or rubs.  No left or right ventricular heave.    Pulses:  Equal bilaterally.    Abdomen: Soft without organomegaly. No rebound, rigidity.    Lymphatic: No lymphadenopathy.  Musculoskeletal: Ambulates with assistance.  Normal curvature of the spine. Weakness.  No involuntary movements.    Extremities:  No lower extremity edema. Sensory function appears intact.  Deep tendon reflexes are normal.   Skin:  Warm and dry.  Old surgical scars.   Neurological/Psychiatric: General behavior, level of consciousness, thought content is normal. Emotional status is normal.  Cranial nerves II-XII are intact.      DATA:   The data collected below information that was obtained, reviewed, analyzed and interpreted today.     Todays Office Spirometry was compared to previous test if available and demonstrates an FVC of 2.92 liters which is 77 % of predicted with an FEV1 of 2.22 liters which is 78 % of predicted.  FEV1/FVC ratio is 76 %. Mid expiratory flow rates are 87 % of predicted.  Maximum voluntary ventilation is 81 liters per minute or 71 % of predicted.  Flow volume loop shows no signs of intrathoracic or extrathoracic process. Impression: Mild Restrictive Physiology    CT SCAN 4/2024: 1. Prominent bilateral pulmonary embolism, right greater than left.  Clot burden is high. There are findings of right ventricular strain.  2. Moderate consolidation of the posterior left lower lobe at the lung base, probably atelectasis, but cannot exclude pneumonia. 3. Status post Whipple procedure.4. 4.2 x 3.0 cm cystic region in the

## 2024-06-07 ENCOUNTER — HOSPITAL ENCOUNTER (EMERGENCY)
Age: 74
Discharge: HOME | End: 2024-06-07
Payer: MEDICARE

## 2024-06-07 ENCOUNTER — APPOINTMENT (OUTPATIENT)
Dept: GENERAL RADIOLOGY | Age: 74
End: 2024-06-07
Payer: MEDICARE

## 2024-06-07 VITALS
RESPIRATION RATE: 18 BRPM | TEMPERATURE: 98.3 F | OXYGEN SATURATION: 96 % | WEIGHT: 180 LBS | DIASTOLIC BLOOD PRESSURE: 67 MMHG | HEART RATE: 72 BPM | BODY MASS INDEX: 27.78 KG/M2 | SYSTOLIC BLOOD PRESSURE: 124 MMHG

## 2024-06-07 DIAGNOSIS — S80.11XA CONTUSION OF RIGHT LOWER LEG, INITIAL ENCOUNTER: Primary | ICD-10-CM

## 2024-06-07 PROCEDURE — 99211 OFF/OP EST MAY X REQ PHY/QHP: CPT

## 2024-06-07 PROCEDURE — 73590 X-RAY EXAM OF LOWER LEG: CPT

## 2024-06-07 RX ORDER — ZOLPIDEM TARTRATE 10 MG/1
TABLET ORAL NIGHTLY PRN
COMMUNITY

## 2024-06-07 ASSESSMENT — PAIN DESCRIPTION - LOCATION: LOCATION: LEG

## 2024-06-07 ASSESSMENT — PAIN SCALES - GENERAL: PAINLEVEL_OUTOF10: 6

## 2024-06-07 ASSESSMENT — PAIN - FUNCTIONAL ASSESSMENT: PAIN_FUNCTIONAL_ASSESSMENT: 0-10

## 2024-06-07 ASSESSMENT — PAIN DESCRIPTION - DESCRIPTORS: DESCRIPTORS: THROBBING

## 2024-06-07 ASSESSMENT — PAIN DESCRIPTION - ORIENTATION: ORIENTATION: RIGHT

## 2024-06-07 NOTE — ED PROVIDER NOTES
[facility]  Department of Emergency Medicine     Encounter Note  Admit Date/RoomTime: 2024  5:01 PM   Room:     NAME: Luis Seo  : 1950  MRN: [id number]     Chief Complaint:  Leg Pain (Pt has right leg pain s/p dropping a cast iron pan that weighed about 10lbs on his leg. Injury happened yesterday afternoon. Pain has worsened. Tylenol last 1400)    History of Present Illness       Luis Seo is a 74 y.o. old male who presents to the urgent care with complaints of pain and tenderness to his right anterior tibia after dropping a cast iron pan weighing about 10 pounds on the front of his leg, states that the pan bounced off of his leg after he dropped it.  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  Patient states the area is very tender to touch.  He has been icing and elevating over the last few hours but states the area is still very painful.  He has been able to ambulate without difficulty.  ROS   Pertinent positives and negatives are stated within HPI, all other systems reviewed and are negative.    Past Medical History:  has no past medical history on file.    Surgical History:  has no past surgical history on file.    Social History:  reports that he has quit smoking. He has never used smokeless tobacco. He reports that he does not drink alcohol and does not use drugs.    Family History: family history is not on file.     Allergies: Gabapentin    Physical Exam   Oxygen Saturation Interpretation: Normal.        ED Triage Vitals [24 1701]   BP Temp Temp src Pulse Respirations SpO2 Height Weight - Scale   124/67 98.3 °F (36.8 °C) -- 72 18 96 % -- 81.6 kg (180 lb)         Constitutional:  Alert, development consistent with age.  HEENT:  NC/NT.

## 2024-06-07 NOTE — DISCHARGE INSTRUCTIONS
Ace wrap to the area to help keep compression on the area.  Keep your leg elevated is much as possible.  Ice 20 minutes on, 20 minutes off.  There are no fractures noted on your x-ray.  Please use extra care while you are on blood thinners.

## 2024-06-17 ENCOUNTER — TELEPHONE (OUTPATIENT)
Dept: CASE MANAGEMENT | Age: 74
End: 2024-06-17

## 2024-06-17 ENCOUNTER — HOSPITAL ENCOUNTER (OUTPATIENT)
Dept: INFUSION THERAPY | Age: 74
Discharge: HOME | End: 2024-06-17
Payer: MEDICARE

## 2024-06-17 ENCOUNTER — OFFICE VISIT (OUTPATIENT)
Dept: ONCOLOGY | Age: 74
End: 2024-06-17
Payer: MEDICARE

## 2024-06-17 ENCOUNTER — TELEPHONE (OUTPATIENT)
Dept: INFUSION THERAPY | Age: 74
End: 2024-06-17

## 2024-06-17 VITALS
DIASTOLIC BLOOD PRESSURE: 89 MMHG | SYSTOLIC BLOOD PRESSURE: 130 MMHG | WEIGHT: 178.3 LBS | RESPIRATION RATE: 20 BRPM | OXYGEN SATURATION: 98 % | HEIGHT: 70 IN | HEART RATE: 84 BPM | TEMPERATURE: 98 F | BODY MASS INDEX: 25.53 KG/M2

## 2024-06-17 DIAGNOSIS — C25.1 MALIGNANT NEOPLASM OF BODY OF PANCREAS (HCC): Primary | ICD-10-CM

## 2024-06-17 DIAGNOSIS — C25.1 MALIGNANT NEOPLASM OF BODY OF PANCREAS (HCC): ICD-10-CM

## 2024-06-17 LAB
ALBUMIN SERPL-MCNC: 4.1 G/DL (ref 3.5–5.2)
ALP SERPL-CCNC: 86 U/L (ref 40–129)
ALT SERPL-CCNC: 30 U/L (ref 0–40)
ANION GAP SERPL CALCULATED.3IONS-SCNC: 10 MMOL/L (ref 7–16)
AST SERPL-CCNC: 25 U/L (ref 0–39)
BASOPHILS # BLD: 0.07 K/UL (ref 0–0.2)
BASOPHILS NFR BLD: 1 % (ref 0–2)
BILIRUB SERPL-MCNC: 0.5 MG/DL (ref 0–1.2)
BUN SERPL-MCNC: 12 MG/DL (ref 6–23)
CALCIUM SERPL-MCNC: 11 MG/DL (ref 8.6–10.2)
CHLORIDE SERPL-SCNC: 107 MMOL/L (ref 98–107)
CO2 SERPL-SCNC: 26 MMOL/L (ref 22–29)
CREAT SERPL-MCNC: 0.6 MG/DL (ref 0.7–1.2)
EOSINOPHIL # BLD: 0.18 K/UL (ref 0.05–0.5)
EOSINOPHILS RELATIVE PERCENT: 4 % (ref 0–6)
ERYTHROCYTE [DISTWIDTH] IN BLOOD BY AUTOMATED COUNT: 16.9 % (ref 11.5–15)
GFR, ESTIMATED: >90 ML/MIN/1.73M2
GLUCOSE SERPL-MCNC: 127 MG/DL (ref 74–99)
HCT VFR BLD AUTO: 38.9 % (ref 37–54)
HGB BLD-MCNC: 12 G/DL (ref 12.5–16.5)
IMM GRANULOCYTES # BLD AUTO: <0.03 K/UL (ref 0–0.58)
IMM GRANULOCYTES NFR BLD: 0 % (ref 0–5)
LYMPHOCYTES NFR BLD: 1.4 K/UL (ref 1.5–4)
LYMPHOCYTES RELATIVE PERCENT: 28 % (ref 20–42)
MCH RBC QN AUTO: 25.8 PG (ref 26–35)
MCHC RBC AUTO-ENTMCNC: 30.8 G/DL (ref 32–34.5)
MCV RBC AUTO: 83.5 FL (ref 80–99.9)
MONOCYTES NFR BLD: 0.53 K/UL (ref 0.1–0.95)
MONOCYTES NFR BLD: 11 % (ref 2–12)
NEUTROPHILS NFR BLD: 56 % (ref 43–80)
NEUTS SEG NFR BLD: 2.82 K/UL (ref 1.8–7.3)
PLATELET # BLD AUTO: 267 K/UL (ref 130–450)
PMV BLD AUTO: 12.2 FL (ref 7–12)
POTASSIUM SERPL-SCNC: 4 MMOL/L (ref 3.5–5)
PROT SERPL-MCNC: 7.1 G/DL (ref 6.4–8.3)
RBC # BLD AUTO: 4.66 M/UL (ref 3.8–5.8)
SODIUM SERPL-SCNC: 143 MMOL/L (ref 132–146)
WBC OTHER # BLD: 5 K/UL (ref 4.5–11.5)

## 2024-06-17 PROCEDURE — 80053 COMPREHEN METABOLIC PANEL: CPT

## 2024-06-17 PROCEDURE — 99214 OFFICE O/P EST MOD 30 MIN: CPT

## 2024-06-17 PROCEDURE — 85025 COMPLETE CBC W/AUTO DIFF WBC: CPT

## 2024-06-17 PROCEDURE — 86301 IMMUNOASSAY TUMOR CA 19-9: CPT

## 2024-06-17 PROCEDURE — 36415 COLL VENOUS BLD VENIPUNCTURE: CPT

## 2024-06-17 RX ORDER — ACETAMINOPHEN 325 MG/1
650 TABLET ORAL EVERY 6 HOURS PRN
COMMUNITY

## 2024-06-17 NOTE — TELEPHONE ENCOUNTER
Met with patient and his daughter, Ranjeet, during his initial consultation appointment with Dr. Trammell at [facility] for his recent Stage IIB (T3, N0, M0) pancreatic cancer diagnosis and Stage I left kidney cancer per Dr. Oneill.  Introduced myself and explained my role with patients receiving treatment at our cancer center.  Patient was friendly and receptive.  Completed nursing assessment for the center including medication review and medical, social, and surgical histories.  Patient lives alone but is daughter lives close and assists when needed.   Reports 50 lb weight loss since the beginning of the year due to his Gullian-Brinklow' diagnosis.  But states that his appetite has improved with the Gullian-Brinklow' symptoms and that he has gained 10-15 lbs back.  Denies any nausea or pain.  Patient is agreeable to dietary referral due to his weight loss.  Also, discussed the additional ancillary staff available at the center and described their roles.  Patient and family decline any further referral needs at this time.  Reviewed PET scan diet instructions in detail and instructed them to contact me if any assistance is needed with scheduling the scan that needs to be completed prior to Dr. Trammell's follow up appointment.  Verbalizes understanding.  Answered additional questions to their apparent satisfaction.  Provided with written literature of [facility] Patient Education:  Pancreatic Cancer, [place] pamphlet, Cancer Care: Online Support Group Information lists, Patient Resource: The [employer]ctor card, PET scan diet instructions, and Prehabilitation and Recovery after Liver, Gallbladder, Bile Duct, or Pancreatic disease sheet.  Provided patient with my contact information, office hours, and encouragement to call me with questions or concerns.  Patient verbalizes understanding and appreciative of nurse navigator visit.  Emotional support provided and greater than 30 minutes spent with patient.  Nurse navigator will

## 2024-06-17 NOTE — TELEPHONE ENCOUNTER
Luis Seo  6/17/2024  Ht Readings from Last 1 Encounters:   06/17/24 1.778 m (5' 10\")     Wt Readings from Last 10 Encounters:   06/17/24 80.9 kg (178 lb 4.8 oz)   06/07/24 81.6 kg (180 lb)   05/16/24 79.4 kg (175 lb)   05/04/24 85.6 kg (188 lb 11.2 oz)   04/29/24 82.6 kg (182 lb 1.6 oz)   07/27/17 104.3 kg (230 lb)     BMI=25.58    Assessment: Met with Luis and his daughter while in for new patient appointment with Dr. Trammell for renal cell carcinoma and pancreatic adenocarcinoma s/p pancreatectomy. Luis reports usual body weight of 220# in March. Denies N/V/D/C. Does report stomach pain which he relates to gastric ulcer. Luis reports increased oral intake and a few pound weight gain with improved appetite. He has tried ONS but disliked them. Discussed eating 6 small meals per day instead of 2 large meals per day. Reviewed if unable to continue to gain some weight, could utilize protein powder with homemade shakes to help gain weight. Provided contact information for questions or concerns.     Weight change: 1.89% weight gain x 1 month, 5.51% weight loss x 6 weeks, 18.95% subjective significant weight loss x 3 months  Appetite: Fair appetite, improving past few weeks. Eating 2 meals per day. (Less than his usual but standard portion size per his daughter)  Nutritional Side Effects: stomach pain  Calculated Needs: 25-30 kcal/kg CBW = 9625-3508 kcal, 1.3-1.5 gm/kg/CBW = 105-125 gm pro, 1 ml/kcal = 6048-5617 ml fluids  Malnutrition Status: At risk    Recommendations: Eat 6 small meals per day to better meet you calorie/protein needs and to reduce stomach discomfort    Doris Pike RD

## 2024-06-17 NOTE — PROGRESS NOTES
Lius Seo  1950 74 y.o.      Referring Physician: Reginald Oneill    PCP: Reginald Oneill MD    Vitals:    24 0951   BP: 130/89   Pulse: 84   Resp: 20   Temp: 98 °F (36.7 °C)   SpO2: 98%        Wt Readings from Last 3 Encounters:   24 80.9 kg (178 lb 4.8 oz)   24 81.6 kg (180 lb)   24 79.4 kg (175 lb)        Body mass index is 25.58 kg/m².    Chief Complaint:   Chief Complaint   Patient presents with    New Patient     Pancreatic adenocarcinoma          Cancer Staging   No matching staging information was found for the patient.    Prior Radiation Therapy? YES: Site Treated: Left Kidney          Facility: [facility]          Date: 3/8/2024    Concurrent Chemo/radiation? NO    Prior Chemotherapy? NO    Prior Hormonal Therapy? NO    Head and Neck Cancer? No, patient does NOT have HN cancer.      LMP: na    Age at first Menses: na    : na    Para: na      Current Outpatient Medications:     rivaroxaban (XARELTO) 20 MG TABS tablet, Take 1 tablet by mouth daily (with breakfast), Disp: , Rfl:     acetaminophen (TYLENOL) 325 MG tablet, Take 2 tablets by mouth every 6 hours as needed for Pain, Disp: , Rfl:     zolpidem (AMBIEN) 10 MG tablet, Take by mouth nightly as needed for Sleep., Disp: , Rfl:     tamsulosin (FLOMAX) 0.4 MG capsule, Take 1 capsule by mouth daily, Disp: 30 capsule, Rfl: 3    melatonin (RA MELATONIN) 3 MG TABS tablet, Take 1 tablet by mouth nightly as needed (for sleep), Disp: , Rfl: 3    ondansetron (ZOFRAN) 4 MG tablet, Take 1 tablet by mouth every 8 hours as needed for Nausea or Vomiting, Disp: , Rfl:        Past Medical History:   Diagnosis Date    Cancer (HCC)     Guillain Barré syndrome (HCC)     Pulmonary embolism (HCC)        Past Surgical History:   Procedure Laterality Date    JOINT REPLACEMENT Bilateral     JOINT REPLACEMENT Right     PANCREAS SURGERY  2024    SHOULDER ARTHROPLASTY Bilateral        Family History   Problem Relation Age of Onset    
gtt and transferred to the ICU for closer monitoring. He is on a warfarin bridge.  -  Neurology consulted.   - Palliative also consulted and patient is considering hospice.   - ProBNP 1044, troponin 74. Cardiology consulted.   - LFT's and CMP unremarkable. CBC with Hgb 11.5 otherwise unremarkable.     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  Not a candidate for adjuvant therapy at this time due to poor performance status.    4/29/24  - Adenocarcinoma of the pancreatic body status post distal pancreatectomy with splenectomy and portal lymphadenectomy at [facility] with positive margins but lymph nodes negative. 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 He is not interested in Hospice at this time. Goals to be discharged to rehab  - MRI brain as well as MRI cervical, thoracic, and lumbar spine all pending   - We will follow    4/30/24  - Adenocarcinoma of the pancreatic body status post distal pancreatectomy with splenectomy and portal lymphadenectomy at [facility] with positive margins but lymph nodes negative. 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 He is not interested in Hospice at this time. Goals to be

## 2024-06-19 LAB — CANCER AG19-9 SERPL IA-ACNC: 772 U/ML (ref 0–35)

## 2024-07-15 ENCOUNTER — HOSPITAL ENCOUNTER (INPATIENT)
Age: 74
LOS: 3 days | Discharge: HOME | DRG: 439 | End: 2024-07-18
Attending: EMERGENCY MEDICINE | Admitting: STUDENT IN AN ORGANIZED HEALTH CARE EDUCATION/TRAINING PROGRAM
Payer: MEDICARE

## 2024-07-15 ENCOUNTER — APPOINTMENT (OUTPATIENT)
Dept: MRI IMAGING | Age: 74
DRG: 439 | End: 2024-07-15
Payer: MEDICARE

## 2024-07-15 ENCOUNTER — APPOINTMENT (OUTPATIENT)
Dept: CT IMAGING | Age: 74
DRG: 439 | End: 2024-07-15
Payer: MEDICARE

## 2024-07-15 DIAGNOSIS — Z51.5 PALLIATIVE CARE ENCOUNTER: ICD-10-CM

## 2024-07-15 DIAGNOSIS — K85.90 ACUTE PANCREATITIS, UNSPECIFIED COMPLICATION STATUS, UNSPECIFIED PANCREATITIS TYPE: Primary | ICD-10-CM

## 2024-07-15 DIAGNOSIS — C25.9 PANCREATIC CARCINOMA METASTATIC TO LIVER (HCC): ICD-10-CM

## 2024-07-15 DIAGNOSIS — C78.7 PANCREATIC CARCINOMA METASTATIC TO LIVER (HCC): ICD-10-CM

## 2024-07-15 PROBLEM — K85.10 ACUTE GALLSTONE PANCREATITIS: Status: ACTIVE | Noted: 2024-07-15

## 2024-07-15 PROBLEM — C25.0 MALIGNANT NEOPLASM OF HEAD OF PANCREAS (HCC): Status: ACTIVE | Noted: 2024-07-15

## 2024-07-15 PROBLEM — K85.10 ACUTE GALLSTONE PANCREATITIS: Status: RESOLVED | Noted: 2024-07-15 | Resolved: 2024-07-15

## 2024-07-15 LAB
ABO + RH BLD: NORMAL
ALBUMIN SERPL-MCNC: 3.6 G/DL (ref 3.5–5.2)
ALP SERPL-CCNC: 100 U/L (ref 40–129)
ALT SERPL-CCNC: 185 U/L (ref 0–40)
ANION GAP SERPL CALCULATED.3IONS-SCNC: 15 MMOL/L (ref 7–16)
ARM BAND NUMBER: NORMAL
AST SERPL-CCNC: 338 U/L (ref 0–39)
BASOPHILS # BLD: 0.1 K/UL (ref 0–0.2)
BASOPHILS NFR BLD: 1 % (ref 0–2)
BILIRUB SERPL-MCNC: 1.5 MG/DL (ref 0–1.2)
BLOOD BANK SAMPLE EXPIRATION: NORMAL
BLOOD GROUP ANTIBODIES SERPL: NEGATIVE
BNP SERPL-MCNC: 283 PG/ML (ref 0–450)
BUN SERPL-MCNC: 11 MG/DL (ref 6–23)
CALCIUM SERPL-MCNC: 9.9 MG/DL (ref 8.6–10.2)
CHLORIDE SERPL-SCNC: 102 MMOL/L (ref 98–107)
CO2 SERPL-SCNC: 21 MMOL/L (ref 22–29)
CREAT SERPL-MCNC: 0.7 MG/DL (ref 0.7–1.2)
EOSINOPHIL # BLD: 0.06 K/UL (ref 0.05–0.5)
EOSINOPHILS RELATIVE PERCENT: 1 % (ref 0–6)
ERYTHROCYTE [DISTWIDTH] IN BLOOD BY AUTOMATED COUNT: 17.3 % (ref 11.5–15)
GFR, ESTIMATED: >90 ML/MIN/1.73M2
GLUCOSE SERPL-MCNC: 179 MG/DL (ref 74–99)
HCT VFR BLD AUTO: 35.7 % (ref 37–54)
HGB BLD-MCNC: 11.6 G/DL (ref 12.5–16.5)
IMM GRANULOCYTES # BLD AUTO: 0.1 K/UL (ref 0–0.58)
IMM GRANULOCYTES NFR BLD: 1 % (ref 0–5)
INR PPP: 2.5
LACTATE BLDV-SCNC: 3.2 MMOL/L (ref 0.5–2.2)
LIPASE SERPL-CCNC: 1999 U/L (ref 13–60)
LYMPHOCYTES NFR BLD: 2.07 K/UL (ref 1.5–4)
LYMPHOCYTES RELATIVE PERCENT: 19 % (ref 20–42)
MCH RBC QN AUTO: 26.4 PG (ref 26–35)
MCHC RBC AUTO-ENTMCNC: 32.5 G/DL (ref 32–34.5)
MCV RBC AUTO: 81.3 FL (ref 80–99.9)
MONOCYTES NFR BLD: 0.96 K/UL (ref 0.1–0.95)
MONOCYTES NFR BLD: 9 % (ref 2–12)
NEUTROPHILS NFR BLD: 69 % (ref 43–80)
NEUTS SEG NFR BLD: 7.36 K/UL (ref 1.8–7.3)
PARTIAL THROMBOPLASTIN TIME: 27.8 SEC (ref 24.5–35.1)
PLATELET # BLD AUTO: 154 K/UL (ref 130–450)
PMV BLD AUTO: 11 FL (ref 7–12)
POTASSIUM SERPL-SCNC: 3.4 MMOL/L (ref 3.5–5)
PROT SERPL-MCNC: 6.4 G/DL (ref 6.4–8.3)
PROTHROMBIN TIME: 27.1 SEC (ref 9.3–12.4)
RBC # BLD AUTO: 4.39 M/UL (ref 3.8–5.8)
SODIUM SERPL-SCNC: 138 MMOL/L (ref 132–146)
TROPONIN I SERPL HS-MCNC: 26 NG/L (ref 0–11)
WBC OTHER # BLD: 10.7 K/UL (ref 4.5–11.5)

## 2024-07-15 PROCEDURE — 74183 MRI ABD W/O CNTR FLWD CNTR: CPT

## 2024-07-15 PROCEDURE — 6360000002 HC RX W HCPCS

## 2024-07-15 PROCEDURE — 96375 TX/PRO/DX INJ NEW DRUG ADDON: CPT

## 2024-07-15 PROCEDURE — 6360000004 HC RX CONTRAST MEDICATION: Performed by: RADIOLOGY

## 2024-07-15 PROCEDURE — 83690 ASSAY OF LIPASE: CPT

## 2024-07-15 PROCEDURE — 85730 THROMBOPLASTIN TIME PARTIAL: CPT

## 2024-07-15 PROCEDURE — 96374 THER/PROPH/DIAG INJ IV PUSH: CPT

## 2024-07-15 PROCEDURE — 86901 BLOOD TYPING SEROLOGIC RH(D): CPT

## 2024-07-15 PROCEDURE — 85610 PROTHROMBIN TIME: CPT

## 2024-07-15 PROCEDURE — 6370000000 HC RX 637 (ALT 250 FOR IP): Performed by: STUDENT IN AN ORGANIZED HEALTH CARE EDUCATION/TRAINING PROGRAM

## 2024-07-15 PROCEDURE — 84484 ASSAY OF TROPONIN QUANT: CPT

## 2024-07-15 PROCEDURE — 96376 TX/PRO/DX INJ SAME DRUG ADON: CPT

## 2024-07-15 PROCEDURE — 2580000003 HC RX 258: Performed by: EMERGENCY MEDICINE

## 2024-07-15 PROCEDURE — 85025 COMPLETE CBC W/AUTO DIFF WBC: CPT

## 2024-07-15 PROCEDURE — 86850 RBC ANTIBODY SCREEN: CPT

## 2024-07-15 PROCEDURE — 83605 ASSAY OF LACTIC ACID: CPT

## 2024-07-15 PROCEDURE — 83880 ASSAY OF NATRIURETIC PEPTIDE: CPT

## 2024-07-15 PROCEDURE — 74174 CTA ABD&PLVS W/CONTRAST: CPT

## 2024-07-15 PROCEDURE — 86900 BLOOD TYPING SEROLOGIC ABO: CPT

## 2024-07-15 PROCEDURE — 80053 COMPREHEN METABOLIC PANEL: CPT

## 2024-07-15 PROCEDURE — 2500000003 HC RX 250 WO HCPCS

## 2024-07-15 PROCEDURE — 99222 1ST HOSP IP/OBS MODERATE 55: CPT | Performed by: STUDENT IN AN ORGANIZED HEALTH CARE EDUCATION/TRAINING PROGRAM

## 2024-07-15 PROCEDURE — 71275 CT ANGIOGRAPHY CHEST: CPT

## 2024-07-15 PROCEDURE — 99223 1ST HOSP IP/OBS HIGH 75: CPT | Performed by: STUDENT IN AN ORGANIZED HEALTH CARE EDUCATION/TRAINING PROGRAM

## 2024-07-15 PROCEDURE — 6360000002 HC RX W HCPCS: Performed by: STUDENT IN AN ORGANIZED HEALTH CARE EDUCATION/TRAINING PROGRAM

## 2024-07-15 PROCEDURE — 6360000002 HC RX W HCPCS: Performed by: EMERGENCY MEDICINE

## 2024-07-15 PROCEDURE — 93005 ELECTROCARDIOGRAM TRACING: CPT | Performed by: EMERGENCY MEDICINE

## 2024-07-15 PROCEDURE — 1200000000 HC SEMI PRIVATE

## 2024-07-15 PROCEDURE — 96361 HYDRATE IV INFUSION ADD-ON: CPT

## 2024-07-15 PROCEDURE — 2580000003 HC RX 258: Performed by: STUDENT IN AN ORGANIZED HEALTH CARE EDUCATION/TRAINING PROGRAM

## 2024-07-15 PROCEDURE — A9577 INJ MULTIHANCE: HCPCS | Performed by: RADIOLOGY

## 2024-07-15 PROCEDURE — 99285 EMERGENCY DEPT VISIT HI MDM: CPT

## 2024-07-15 RX ORDER — SODIUM CHLORIDE 0.9 % (FLUSH) 0.9 %
5-40 SYRINGE (ML) INJECTION EVERY 12 HOURS SCHEDULED
Status: DISCONTINUED | OUTPATIENT
Start: 2024-07-15 | End: 2024-07-18 | Stop reason: HOSPADM

## 2024-07-15 RX ORDER — MAGNESIUM SULFATE IN WATER 40 MG/ML
2000 INJECTION, SOLUTION INTRAVENOUS PRN
Status: DISCONTINUED | OUTPATIENT
Start: 2024-07-15 | End: 2024-07-18 | Stop reason: HOSPADM

## 2024-07-15 RX ORDER — ONDANSETRON 4 MG/1
4 TABLET, ORALLY DISINTEGRATING ORAL EVERY 8 HOURS PRN
Status: DISCONTINUED | OUTPATIENT
Start: 2024-07-15 | End: 2024-07-15

## 2024-07-15 RX ORDER — ACETAMINOPHEN 325 MG/1
650 TABLET ORAL EVERY 6 HOURS PRN
Status: DISCONTINUED | OUTPATIENT
Start: 2024-07-15 | End: 2024-07-18 | Stop reason: HOSPADM

## 2024-07-15 RX ORDER — SODIUM CHLORIDE 9 MG/ML
INJECTION, SOLUTION INTRAVENOUS PRN
Status: DISCONTINUED | OUTPATIENT
Start: 2024-07-15 | End: 2024-07-18 | Stop reason: HOSPADM

## 2024-07-15 RX ORDER — SODIUM CHLORIDE 0.9 % (FLUSH) 0.9 %
5-40 SYRINGE (ML) INJECTION PRN
Status: DISCONTINUED | OUTPATIENT
Start: 2024-07-15 | End: 2024-07-18 | Stop reason: HOSPADM

## 2024-07-15 RX ORDER — NICOTINE 21 MG/24HR
1 PATCH, TRANSDERMAL 24 HOURS TRANSDERMAL DAILY
Status: DISCONTINUED | OUTPATIENT
Start: 2024-07-15 | End: 2024-07-18 | Stop reason: HOSPADM

## 2024-07-15 RX ORDER — INSULIN LISPRO 100 [IU]/ML
0-4 INJECTION, SOLUTION INTRAVENOUS; SUBCUTANEOUS
Status: DISCONTINUED | OUTPATIENT
Start: 2024-07-15 | End: 2024-07-18 | Stop reason: HOSPADM

## 2024-07-15 RX ORDER — TAMSULOSIN HYDROCHLORIDE 0.4 MG/1
0.4 CAPSULE ORAL DAILY
Status: DISCONTINUED | OUTPATIENT
Start: 2024-07-15 | End: 2024-07-18 | Stop reason: HOSPADM

## 2024-07-15 RX ORDER — FENTANYL CITRATE 50 UG/ML
INJECTION, SOLUTION INTRAMUSCULAR; INTRAVENOUS
Status: COMPLETED
Start: 2024-07-15 | End: 2024-07-15

## 2024-07-15 RX ORDER — FENTANYL CITRATE 50 UG/ML
50 INJECTION, SOLUTION INTRAMUSCULAR; INTRAVENOUS ONCE
Status: COMPLETED | OUTPATIENT
Start: 2024-07-15 | End: 2024-07-15

## 2024-07-15 RX ORDER — INSULIN LISPRO 100 [IU]/ML
0-4 INJECTION, SOLUTION INTRAVENOUS; SUBCUTANEOUS NIGHTLY
Status: DISCONTINUED | OUTPATIENT
Start: 2024-07-15 | End: 2024-07-18 | Stop reason: HOSPADM

## 2024-07-15 RX ORDER — HYDROMORPHONE HYDROCHLORIDE 1 MG/ML
1 INJECTION, SOLUTION INTRAMUSCULAR; INTRAVENOUS; SUBCUTANEOUS
Status: DISCONTINUED | OUTPATIENT
Start: 2024-07-15 | End: 2024-07-18 | Stop reason: HOSPADM

## 2024-07-15 RX ORDER — SODIUM CHLORIDE 9 MG/ML
INJECTION, SOLUTION INTRAVENOUS CONTINUOUS
Status: DISCONTINUED | OUTPATIENT
Start: 2024-07-15 | End: 2024-07-15

## 2024-07-15 RX ORDER — POTASSIUM CHLORIDE 7.45 MG/ML
10 INJECTION INTRAVENOUS PRN
Status: DISCONTINUED | OUTPATIENT
Start: 2024-07-15 | End: 2024-07-18 | Stop reason: HOSPADM

## 2024-07-15 RX ORDER — ACETAMINOPHEN 650 MG/1
650 SUPPOSITORY RECTAL EVERY 6 HOURS PRN
Status: DISCONTINUED | OUTPATIENT
Start: 2024-07-15 | End: 2024-07-18 | Stop reason: HOSPADM

## 2024-07-15 RX ORDER — POTASSIUM CHLORIDE 7.45 MG/ML
10 INJECTION INTRAVENOUS
Status: COMPLETED | OUTPATIENT
Start: 2024-07-15 | End: 2024-07-15

## 2024-07-15 RX ORDER — DEXTROSE MONOHYDRATE 100 MG/ML
INJECTION, SOLUTION INTRAVENOUS CONTINUOUS PRN
Status: DISCONTINUED | OUTPATIENT
Start: 2024-07-15 | End: 2024-07-18 | Stop reason: HOSPADM

## 2024-07-15 RX ORDER — POTASSIUM CHLORIDE 20 MEQ/1
40 TABLET, EXTENDED RELEASE ORAL PRN
Status: DISCONTINUED | OUTPATIENT
Start: 2024-07-15 | End: 2024-07-18 | Stop reason: HOSPADM

## 2024-07-15 RX ORDER — ZOLPIDEM TARTRATE 5 MG/1
10 TABLET ORAL NIGHTLY PRN
Status: DISCONTINUED | OUTPATIENT
Start: 2024-07-15 | End: 2024-07-18 | Stop reason: HOSPADM

## 2024-07-15 RX ORDER — PROCHLORPERAZINE MALEATE 10 MG
10 TABLET ORAL EVERY 8 HOURS PRN
Status: DISCONTINUED | OUTPATIENT
Start: 2024-07-15 | End: 2024-07-18 | Stop reason: HOSPADM

## 2024-07-15 RX ORDER — PANTOPRAZOLE SODIUM 40 MG/10ML
40 INJECTION, POWDER, LYOPHILIZED, FOR SOLUTION INTRAVENOUS ONCE
Status: COMPLETED | OUTPATIENT
Start: 2024-07-15 | End: 2024-07-15

## 2024-07-15 RX ORDER — PROCHLORPERAZINE EDISYLATE 5 MG/ML
10 INJECTION INTRAMUSCULAR; INTRAVENOUS EVERY 6 HOURS PRN
Status: DISCONTINUED | OUTPATIENT
Start: 2024-07-15 | End: 2024-07-18 | Stop reason: HOSPADM

## 2024-07-15 RX ORDER — SODIUM CHLORIDE, SODIUM LACTATE, POTASSIUM CHLORIDE, CALCIUM CHLORIDE 600; 310; 30; 20 MG/100ML; MG/100ML; MG/100ML; MG/100ML
INJECTION, SOLUTION INTRAVENOUS CONTINUOUS
Status: DISCONTINUED | OUTPATIENT
Start: 2024-07-15 | End: 2024-07-16

## 2024-07-15 RX ORDER — GLUCAGON 1 MG/ML
1 KIT INJECTION PRN
Status: DISCONTINUED | OUTPATIENT
Start: 2024-07-15 | End: 2024-07-18 | Stop reason: HOSPADM

## 2024-07-15 RX ORDER — ONDANSETRON 2 MG/ML
4 INJECTION INTRAMUSCULAR; INTRAVENOUS EVERY 6 HOURS PRN
Status: DISCONTINUED | OUTPATIENT
Start: 2024-07-15 | End: 2024-07-15

## 2024-07-15 RX ORDER — POLYETHYLENE GLYCOL 3350 17 G/17G
17 POWDER, FOR SOLUTION ORAL DAILY PRN
Status: DISCONTINUED | OUTPATIENT
Start: 2024-07-15 | End: 2024-07-18 | Stop reason: HOSPADM

## 2024-07-15 RX ADMIN — GADOBENATE DIMEGLUMINE 17 ML: 529 INJECTION, SOLUTION INTRAVENOUS at 20:47

## 2024-07-15 RX ADMIN — POTASSIUM CHLORIDE 10 MEQ: 7.46 INJECTION, SOLUTION INTRAVENOUS at 18:54

## 2024-07-15 RX ADMIN — ZOLPIDEM TARTRATE 10 MG: 5 TABLET, COATED ORAL at 20:52

## 2024-07-15 RX ADMIN — SODIUM CHLORIDE, POTASSIUM CHLORIDE, SODIUM LACTATE AND CALCIUM CHLORIDE: 600; 310; 30; 20 INJECTION, SOLUTION INTRAVENOUS at 17:46

## 2024-07-15 RX ADMIN — PIPERACILLIN AND TAZOBACTAM 3375 MG: 3; .375 INJECTION, POWDER, LYOPHILIZED, FOR SOLUTION INTRAVENOUS at 22:25

## 2024-07-15 RX ADMIN — IOPAMIDOL 75 ML: 755 INJECTION, SOLUTION INTRAVENOUS at 14:11

## 2024-07-15 RX ADMIN — FENTANYL CITRATE 50 MCG: 50 INJECTION INTRAMUSCULAR; INTRAVENOUS at 15:44

## 2024-07-15 RX ADMIN — PIPERACILLIN AND TAZOBACTAM 4500 MG: 4; .5 INJECTION, POWDER, LYOPHILIZED, FOR SOLUTION INTRAVENOUS at 17:35

## 2024-07-15 RX ADMIN — POTASSIUM CHLORIDE 10 MEQ: 7.46 INJECTION, SOLUTION INTRAVENOUS at 17:48

## 2024-07-15 RX ADMIN — HYDROMORPHONE HYDROCHLORIDE 0.5 MG: 1 INJECTION, SOLUTION INTRAMUSCULAR; INTRAVENOUS; SUBCUTANEOUS at 17:50

## 2024-07-15 RX ADMIN — FENTANYL CITRATE 50 MCG: 50 INJECTION, SOLUTION INTRAMUSCULAR; INTRAVENOUS at 14:26

## 2024-07-15 RX ADMIN — POTASSIUM CHLORIDE 10 MEQ: 7.46 INJECTION, SOLUTION INTRAVENOUS at 20:47

## 2024-07-15 RX ADMIN — HYDROMORPHONE HYDROCHLORIDE 1 MG: 1 INJECTION, SOLUTION INTRAMUSCULAR; INTRAVENOUS; SUBCUTANEOUS at 20:52

## 2024-07-15 RX ADMIN — PANTOPRAZOLE SODIUM 40 MG: 40 INJECTION, POWDER, LYOPHILIZED, FOR SOLUTION INTRAVENOUS at 15:00

## 2024-07-15 RX ADMIN — SODIUM CHLORIDE 100 ML/HR: 9 INJECTION, SOLUTION INTRAVENOUS at 14:25

## 2024-07-15 ASSESSMENT — PAIN DESCRIPTION - ORIENTATION: ORIENTATION: UPPER;MID

## 2024-07-15 ASSESSMENT — PAIN - FUNCTIONAL ASSESSMENT
PAIN_FUNCTIONAL_ASSESSMENT: ACTIVITIES ARE NOT PREVENTED
PAIN_FUNCTIONAL_ASSESSMENT: 0-10

## 2024-07-15 ASSESSMENT — PAIN DESCRIPTION - LOCATION
LOCATION: ABDOMEN

## 2024-07-15 ASSESSMENT — PAIN SCALES - GENERAL
PAINLEVEL_OUTOF10: 8
PAINLEVEL_OUTOF10: 8
PAINLEVEL_OUTOF10: 2
PAINLEVEL_OUTOF10: 10
PAINLEVEL_OUTOF10: 8

## 2024-07-15 ASSESSMENT — PAIN DESCRIPTION - PAIN TYPE: TYPE: ACUTE PAIN

## 2024-07-15 ASSESSMENT — PAIN DESCRIPTION - DESCRIPTORS
DESCRIPTORS: SHOOTING;SHARP
DESCRIPTORS: STABBING
DESCRIPTORS: SHARP;STABBING
DESCRIPTORS: ACHING;PRESSURE

## 2024-07-15 ASSESSMENT — LIFESTYLE VARIABLES: HOW OFTEN DO YOU HAVE A DRINK CONTAINING ALCOHOL: NEVER

## 2024-07-15 ASSESSMENT — PAIN DESCRIPTION - FREQUENCY: FREQUENCY: CONTINUOUS

## 2024-07-15 NOTE — CONSULTS
GENERAL SURGERY  CONSULT NOTE  7/15/2024    Physician Consulted: Dr. Kahn   Reason for Consult: gallstone panc   Referring Physician: Dr. Stephanie SMITH  Luis Seo is a 74 y.o. male with a history of pancreatic cancer s/p distal pancreatectomy and splenectomy, lymph adenectomy, left renal tumor ablation in March 2024, hx of PE on Xarelto. He presented to the ED for evaluation of severe abdominal pain. He states he had been having episodes of epigastric pain intermittently that were not nearly as severe as this episode. His family states he was bent over in pain. Since receiving some pain medications and fluids he is feeling significantly better. He does follow with oncology for his pancreatic cancer and is not a candidate for chemotherapy.     Vss in the ED. ALT and AST elevated at 185 and 338, respectively. Lipase 1999 and tbili 1.5 from normal LFTs 1 month prior. CTA abdomen pelvis with a pancreatic head lesions, ? Hepatic lesions, distended gallbladder without evidence of gallstones or sludge.     Past Medical History:   Diagnosis Date    Cancer (HCC)     Guillain Barré syndrome (HCC)     Pulmonary embolism (HCC)        Past Surgical History:   Procedure Laterality Date    JOINT REPLACEMENT Bilateral     JOINT REPLACEMENT Right     PANCREAS SURGERY  03/08/2024    SHOULDER ARTHROPLASTY Bilateral        Medications Prior to Admission    Prior to Admission medications    Medication Sig Start Date End Date Taking? Authorizing Provider   rivaroxaban (XARELTO) 20 MG TABS tablet Take 1 tablet by mouth daily (with breakfast)    ProviderDaryl MD   acetaminophen (TYLENOL) 325 MG tablet Take 2 tablets by mouth every 6 hours as needed for Pain    ProviderDaryl MD   zolpidem (AMBIEN) 10 MG tablet Take by mouth nightly as needed for Sleep.    Daryl Martin MD   tamsulosin (FLOMAX) 0.4 MG capsule Take 1 capsule by mouth daily 5/7/24   Giuseppe Church MD   melatonin (RA MELATONIN) 3 MG TABS tablet

## 2024-07-15 NOTE — ED NOTES
Radiology Procedure Waiver   Name: Luis Seo  : 1950  MRN: [id number]    Date:  7/15/24    Time: 2:13 PM EDT    Benefits of immediately proceeding with Radiology exam(s) without pre-testing outweigh the risks or are not indicated as specified below and therefore the following is/are being waived:    [] Pregnancy test   [] Patients LMP on-time and regular.   [] Patient had Tubal Ligation or has other Contraception Device.   [] Patient  is Menopausal or Premenarcheal.    [] Patient had Full or Partial Hysterectomy.    [] Protocol for Iodine allergy    [] MRI Questionnaire     [x] BUN/Creatinine   [] Patient age w/no hx of renal dysfunction.   [] Patient on Dialysis.   [] Recent Normal Labs.  Electronically signed by Champ Felix DO on 7/15/24 at 2:13 PM EDT

## 2024-07-15 NOTE — ED NOTES
Patient now complaining of left chest pain. Repeat EKG obtained, Dr. Brown notified.  Verbal order obtained.

## 2024-07-15 NOTE — H&P
[facility] Hospitalist Group History and Physical          PCP: Reginald Oneill MD    Date of Admission: 7/15/2024    Date of Service: Pt seen/examined on 7/15/2024 and is admitted to Inpatient with expected LOS greater than two midnights due to medical therapy.     Chief Complaint:  had concerns including Abdominal Pain and Chest Pain (Abdominal pain radiating to chest.  Patient took 1 Nitro and 1 Reg Aspirin at home.  Screaming in pain on arrival  & hypotensive according to EMS.).    History Of Present Illness:    Mr. Luis Seo, a 74 y.o. year old male  who  has a past medical history of Cancer (HCC), Guillain Barré syndrome (HCC), and Pulmonary embolism (HCC).     This patient presented to [facility] emergency room on 7/15/2024 for unrelenting abdominal pain radiating to back.  He was recently diagnosed with metastatic pancreatic cancer and has been following up with oncology service at [facility] back in March 2024.  There was a concern for aortic dissection.  CTA chest with contrast no evidence of thoracoabdominal aortic aneurysm or dissection.  No evidence of pulmonary embolism.  CT abdomen pelvis showed 4.4 cm pancreatic head lesion with multiple hepatic lesions which are consistent with metastatic pancreatic carcinoma.  Lab work sodium 138, potassium 3.4, bicarb 21, BUN/creatinine 11/0.7, lactic acid 3.2, blood glucose 179, troponin elevated to 26, proBNP 283  LFT , , , total bilirubin 1.5  Lipase 1999  Patient states that he is pain is well-controlled with IV Dilaudid.  Discussed with him and his daughter at bedside about chemo and radiation however patient does not want therapy started.  General surgery has been consulted from ED.  Patient will be admitted for further evaluation and workup.          Past Medical History:        Diagnosis Date    Cancer (HCC)     Guillain Barré syndrome (HCC)     Pulmonary embolism (HCC)        Past    Component Value Date    INR 2.5 07/15/2024    LABA1C 7.3 (H) 05/01/2024       Urinalysis:    Lab Results   Component Value Date/Time    NITRU NEGATIVE 04/26/2024 11:36 AM    WBCUA >20 12/25/2010 02:20 PM    BACTERIA MODERATE 12/25/2010 02:20 PM    RBCUA 2-5 12/25/2010 02:20 PM    BLOODU SMALL 12/25/2010 02:20 PM    GLUCOSEU NEGATIVE 04/26/2024 11:36 AM    GLUCOSEU NEGATIVE 12/25/2010 02:20 PM       Imaging:  CTA CHEST W CONTRAST    Result Date: 7/15/2024  EXAMINATION: CTA OF THE CHEST; CTA OF THE ABDOMEN AND PELVIS WITH CONTRAST 7/15/2024 2:19 pm CTA CHEST WITH CONTRAST TECHNIQUE: CTA of the chest was performed after the administration of intravenous contrast.  Multiplanar reformatted images are provided for review.  MIP images are provided for review. Automated exposure control, iterative reconstruction, and/or weight based adjustment of the mA/kV was utilized to reduce the radiation dose to as low as reasonably achievable.; CTA of the abdomen and pelvis was performed with the administration of intravenous contrast. Multiplanar reformatted images are provided for review.  MIP images are provided for review. Automated exposure control, iterative reconstruction, and/or weight based adjustment of the mA/kV was utilized to reduce the radiation dose to as low as reasonably achievable. CT angiogram for thoracic aortic dissection WITH IV CONTRAST: with post-processing, volume rendering and 3-D acquisitions.  Prior to and following the intravenous administration of contrast, standard images of the chest were obtained.  This exam was performed according to our departmental dose optimization program, and includes the following measures where applicable: automated exposure control, adjustment of the mAs and/or kVp according to patient size and/or exam, and an iterative reconstruction algorithm. COMPARISON: 04/06/2024. HISTORY: ORDERING SYSTEM PROVIDED HISTORY: dissection TECHNOLOGIST PROVIDED HISTORY: Reason for

## 2024-07-16 PROBLEM — Z71.89 ACP (ADVANCE CARE PLANNING): Status: ACTIVE | Noted: 2024-07-16

## 2024-07-16 LAB
ALBUMIN SERPL-MCNC: 3.4 G/DL (ref 3.5–5.2)
ALP SERPL-CCNC: 120 U/L (ref 40–129)
ALT SERPL-CCNC: 268 U/L (ref 0–40)
ANION GAP SERPL CALCULATED.3IONS-SCNC: 8 MMOL/L (ref 7–16)
AST SERPL-CCNC: 209 U/L (ref 0–39)
BASOPHILS # BLD: 0.06 K/UL (ref 0–0.2)
BASOPHILS NFR BLD: 1 % (ref 0–2)
BILIRUB SERPL-MCNC: 1.1 MG/DL (ref 0–1.2)
BUN SERPL-MCNC: 10 MG/DL (ref 6–23)
CALCIUM SERPL-MCNC: 10 MG/DL (ref 8.6–10.2)
CHLORIDE SERPL-SCNC: 107 MMOL/L (ref 98–107)
CO2 SERPL-SCNC: 24 MMOL/L (ref 22–29)
CREAT SERPL-MCNC: 0.6 MG/DL (ref 0.7–1.2)
EKG ATRIAL RATE: 62 BPM
EKG P AXIS: 75 DEGREES
EKG P-R INTERVAL: 204 MS
EKG Q-T INTERVAL: 598 MS
EKG QRS DURATION: 134 MS
EKG QTC CALCULATION (BAZETT): 606 MS
EKG R AXIS: 84 DEGREES
EKG T AXIS: 81 DEGREES
EKG VENTRICULAR RATE: 62 BPM
EOSINOPHIL # BLD: 0.2 K/UL (ref 0.05–0.5)
EOSINOPHILS RELATIVE PERCENT: 2 % (ref 0–6)
ERYTHROCYTE [DISTWIDTH] IN BLOOD BY AUTOMATED COUNT: 17.8 % (ref 11.5–15)
GFR, ESTIMATED: >90 ML/MIN/1.73M2
GLUCOSE BLD-MCNC: 104 MG/DL (ref 74–99)
GLUCOSE BLD-MCNC: 116 MG/DL (ref 74–99)
GLUCOSE BLD-MCNC: 118 MG/DL (ref 74–99)
GLUCOSE BLD-MCNC: 170 MG/DL (ref 74–99)
GLUCOSE SERPL-MCNC: 119 MG/DL (ref 74–99)
HCT VFR BLD AUTO: 36.1 % (ref 37–54)
HGB BLD-MCNC: 11.1 G/DL (ref 12.5–16.5)
IMM GRANULOCYTES # BLD AUTO: 0.03 K/UL (ref 0–0.58)
IMM GRANULOCYTES NFR BLD: 0 % (ref 0–5)
LYMPHOCYTES NFR BLD: 1.32 K/UL (ref 1.5–4)
LYMPHOCYTES RELATIVE PERCENT: 16 % (ref 20–42)
MAGNESIUM SERPL-MCNC: 1.9 MG/DL (ref 1.6–2.6)
MCH RBC QN AUTO: 25.5 PG (ref 26–35)
MCHC RBC AUTO-ENTMCNC: 30.7 G/DL (ref 32–34.5)
MCV RBC AUTO: 83 FL (ref 80–99.9)
MONOCYTES NFR BLD: 0.85 K/UL (ref 0.1–0.95)
MONOCYTES NFR BLD: 10 % (ref 2–12)
NEUTROPHILS NFR BLD: 71 % (ref 43–80)
NEUTS SEG NFR BLD: 5.95 K/UL (ref 1.8–7.3)
PHOSPHATE SERPL-MCNC: 3.1 MG/DL (ref 2.5–4.5)
PLATELET # BLD AUTO: 303 K/UL (ref 130–450)
PMV BLD AUTO: 11.3 FL (ref 7–12)
POTASSIUM SERPL-SCNC: 3.5 MMOL/L (ref 3.5–5)
PROT SERPL-MCNC: 6.1 G/DL (ref 6.4–8.3)
RBC # BLD AUTO: 4.35 M/UL (ref 3.8–5.8)
SODIUM SERPL-SCNC: 139 MMOL/L (ref 132–146)
WBC OTHER # BLD: 8.4 K/UL (ref 4.5–11.5)

## 2024-07-16 PROCEDURE — 2580000003 HC RX 258

## 2024-07-16 PROCEDURE — 93010 ELECTROCARDIOGRAM REPORT: CPT | Performed by: INTERNAL MEDICINE

## 2024-07-16 PROCEDURE — 6370000000 HC RX 637 (ALT 250 FOR IP): Performed by: STUDENT IN AN ORGANIZED HEALTH CARE EDUCATION/TRAINING PROGRAM

## 2024-07-16 PROCEDURE — 6360000002 HC RX W HCPCS: Performed by: STUDENT IN AN ORGANIZED HEALTH CARE EDUCATION/TRAINING PROGRAM

## 2024-07-16 PROCEDURE — 82962 GLUCOSE BLOOD TEST: CPT

## 2024-07-16 PROCEDURE — 1200000000 HC SEMI PRIVATE

## 2024-07-16 PROCEDURE — 83735 ASSAY OF MAGNESIUM: CPT

## 2024-07-16 PROCEDURE — 99497 ADVNCD CARE PLAN 30 MIN: CPT | Performed by: NURSE PRACTITIONER

## 2024-07-16 PROCEDURE — 2580000003 HC RX 258: Performed by: STUDENT IN AN ORGANIZED HEALTH CARE EDUCATION/TRAINING PROGRAM

## 2024-07-16 PROCEDURE — 6370000000 HC RX 637 (ALT 250 FOR IP): Performed by: NURSE PRACTITIONER

## 2024-07-16 PROCEDURE — 84100 ASSAY OF PHOSPHORUS: CPT

## 2024-07-16 PROCEDURE — 36415 COLL VENOUS BLD VENIPUNCTURE: CPT

## 2024-07-16 PROCEDURE — 80053 COMPREHEN METABOLIC PANEL: CPT

## 2024-07-16 PROCEDURE — 99222 1ST HOSP IP/OBS MODERATE 55: CPT | Performed by: NURSE PRACTITIONER

## 2024-07-16 PROCEDURE — 2500000003 HC RX 250 WO HCPCS

## 2024-07-16 PROCEDURE — 85025 COMPLETE CBC W/AUTO DIFF WBC: CPT

## 2024-07-16 PROCEDURE — 99232 SBSQ HOSP IP/OBS MODERATE 35: CPT | Performed by: STUDENT IN AN ORGANIZED HEALTH CARE EDUCATION/TRAINING PROGRAM

## 2024-07-16 RX ORDER — TRAMADOL HYDROCHLORIDE 50 MG/1
50 TABLET ORAL EVERY 4 HOURS PRN
Status: DISCONTINUED | OUTPATIENT
Start: 2024-07-16 | End: 2024-07-16

## 2024-07-16 RX ORDER — POLYETHYLENE GLYCOL 3350 17 G/17G
17 POWDER, FOR SOLUTION ORAL PRN
COMMUNITY
Start: 2024-04-19

## 2024-07-16 RX ORDER — CALCIUM CARBONATE 500 MG/1
500 TABLET, CHEWABLE ORAL 2 TIMES DAILY PRN
Status: DISCONTINUED | OUTPATIENT
Start: 2024-07-16 | End: 2024-07-18 | Stop reason: HOSPADM

## 2024-07-16 RX ORDER — SENNA AND DOCUSATE SODIUM 50; 8.6 MG/1; MG/1
1 TABLET, FILM COATED ORAL DAILY
Status: DISCONTINUED | OUTPATIENT
Start: 2024-07-16 | End: 2024-07-18 | Stop reason: HOSPADM

## 2024-07-16 RX ORDER — SUCRALFATE 1 G/1
1 TABLET ORAL 4 TIMES DAILY
Status: ON HOLD | COMMUNITY
End: 2024-07-16

## 2024-07-16 RX ORDER — TRAZODONE HYDROCHLORIDE 50 MG/1
50 TABLET ORAL NIGHTLY PRN
Status: ON HOLD | COMMUNITY
Start: 2024-05-08 | End: 2024-07-16

## 2024-07-16 RX ORDER — CARVEDILOL 25 MG/1
50 TABLET ORAL 2 TIMES DAILY
COMMUNITY
End: 2024-07-24

## 2024-07-16 RX ORDER — INSULIN GLARGINE 100 [IU]/ML
30 INJECTION, SOLUTION SUBCUTANEOUS EVERY MORNING
Status: ON HOLD | COMMUNITY
End: 2024-07-16

## 2024-07-16 RX ORDER — PANTOPRAZOLE SODIUM 20 MG/1
20 TABLET, DELAYED RELEASE ORAL DAILY
Status: ON HOLD | COMMUNITY
Start: 2024-06-04 | End: 2024-07-16

## 2024-07-16 RX ORDER — PREGABALIN 50 MG/1
50 CAPSULE ORAL 2 TIMES DAILY
Status: ON HOLD | COMMUNITY
Start: 2024-04-12 | End: 2024-07-16

## 2024-07-16 RX ORDER — FLUTICASONE PROPIONATE 50 MCG
1 SPRAY, SUSPENSION (ML) NASAL DAILY
COMMUNITY
End: 2024-07-24

## 2024-07-16 RX ORDER — HYDROCODONE BITARTRATE AND ACETAMINOPHEN 5; 325 MG/1; MG/1
1 TABLET ORAL EVERY 4 HOURS PRN
Status: DISCONTINUED | OUTPATIENT
Start: 2024-07-16 | End: 2024-07-18 | Stop reason: HOSPADM

## 2024-07-16 RX ORDER — ATORVASTATIN CALCIUM 80 MG/1
80 TABLET, FILM COATED ORAL DAILY
COMMUNITY
End: 2024-07-24

## 2024-07-16 RX ORDER — HYDROCODONE BITARTRATE AND ACETAMINOPHEN 5; 325 MG/1; MG/1
2 TABLET ORAL EVERY 4 HOURS PRN
Status: DISCONTINUED | OUTPATIENT
Start: 2024-07-16 | End: 2024-07-18 | Stop reason: HOSPADM

## 2024-07-16 RX ORDER — OXYCODONE HYDROCHLORIDE 15 MG/1
15 TABLET ORAL 3 TIMES DAILY
Status: ON HOLD | COMMUNITY
Start: 2024-04-22 | End: 2024-07-16 | Stop reason: ALTCHOICE

## 2024-07-16 RX ORDER — HALOPERIDOL 2 MG/ML
1 SOLUTION ORAL EVERY 4 HOURS PRN
Status: ON HOLD | COMMUNITY
Start: 2024-04-23 | End: 2024-07-16

## 2024-07-16 RX ADMIN — PANCRELIPASE LIPASE, PANCRELIPASE PROTEASE, PANCRELIPASE AMYLASE 80000 UNITS: 20000; 63000; 84000 CAPSULE, DELAYED RELEASE ORAL at 14:16

## 2024-07-16 RX ADMIN — CALCIUM CARBONATE 500 MG: 500 TABLET, CHEWABLE ORAL at 16:57

## 2024-07-16 RX ADMIN — TAMSULOSIN HYDROCHLORIDE 0.4 MG: 0.4 CAPSULE ORAL at 08:32

## 2024-07-16 RX ADMIN — SENNOSIDES AND DOCUSATE SODIUM 1 TABLET: 50; 8.6 TABLET ORAL at 12:26

## 2024-07-16 RX ADMIN — HYDROMORPHONE HYDROCHLORIDE 1 MG: 1 INJECTION, SOLUTION INTRAMUSCULAR; INTRAVENOUS; SUBCUTANEOUS at 16:43

## 2024-07-16 RX ADMIN — PIPERACILLIN AND TAZOBACTAM 3375 MG: 3; .375 INJECTION, POWDER, LYOPHILIZED, FOR SOLUTION INTRAVENOUS at 09:00

## 2024-07-16 RX ADMIN — TRAMADOL HYDROCHLORIDE 50 MG: 50 TABLET ORAL at 14:11

## 2024-07-16 RX ADMIN — PROCHLORPERAZINE EDISYLATE 10 MG: 5 INJECTION INTRAMUSCULAR; INTRAVENOUS at 16:52

## 2024-07-16 RX ADMIN — HYDROMORPHONE HYDROCHLORIDE 1 MG: 1 INJECTION, SOLUTION INTRAMUSCULAR; INTRAVENOUS; SUBCUTANEOUS at 08:24

## 2024-07-16 RX ADMIN — CALCIUM CARBONATE 500 MG: 500 TABLET, CHEWABLE ORAL at 08:32

## 2024-07-16 RX ADMIN — SODIUM CHLORIDE, POTASSIUM CHLORIDE, SODIUM LACTATE AND CALCIUM CHLORIDE: 600; 310; 30; 20 INJECTION, SOLUTION INTRAVENOUS at 11:21

## 2024-07-16 RX ADMIN — SODIUM CHLORIDE, PRESERVATIVE FREE 10 ML: 5 INJECTION INTRAVENOUS at 08:27

## 2024-07-16 ASSESSMENT — PAIN DESCRIPTION - LOCATION
LOCATION: ABDOMEN

## 2024-07-16 ASSESSMENT — PAIN DESCRIPTION - DESCRIPTORS
DESCRIPTORS: ACHING;DISCOMFORT
DESCRIPTORS: ACHING
DESCRIPTORS: ACHING

## 2024-07-16 ASSESSMENT — PAIN SCALES - GENERAL
PAINLEVEL_OUTOF10: 4
PAINLEVEL_OUTOF10: 6
PAINLEVEL_OUTOF10: 8
PAINLEVEL_OUTOF10: 7
PAINLEVEL_OUTOF10: 3
PAINLEVEL_OUTOF10: 4

## 2024-07-16 ASSESSMENT — PAIN DESCRIPTION - ORIENTATION
ORIENTATION: MID

## 2024-07-16 NOTE — ED PROVIDER NOTES
polyethylene glycol (GLYCOLAX) packet 17 g (has no administration in time range)   acetaminophen (TYLENOL) tablet 650 mg (has no administration in time range)     Or   acetaminophen (TYLENOL) suppository 650 mg (has no administration in time range)   lactated ringers IV soln infusion ( IntraVENous Rate/Dose Change 7/15/24 1817)   potassium chloride 10 mEq/100 mL IVPB (Peripheral Line) (10 mEq IntraVENous New Bag 7/15/24 1854)   sennosides (SENOKOT) 8.8 MG/5ML syrup 5 mL (has no administration in time range)   nicotine (NICODERM CQ) 21 MG/24HR 1 patch (1 patch TransDERmal Not Given 7/15/24 1736)   tamsulosin (FLOMAX) capsule 0.4 mg (0.4 mg Oral Not Given 7/15/24 1737)   zolpidem (AMBIEN) tablet 10 mg (has no administration in time range)   piperacillin-tazobactam (ZOSYN) 3,375 mg in sodium chloride 0.9 % 50 mL IVPB (Abni8Oop) (has no administration in time range)   prochlorperazine (COMPAZINE) tablet 10 mg (has no administration in time range)     Or   prochlorperazine (COMPAZINE) injection 10 mg (has no administration in time range)   glucose chewable tablet 16 g (has no administration in time range)   dextrose bolus 10% 125 mL (has no administration in time range)     Or   dextrose bolus 10% 250 mL (has no administration in time range)   glucagon injection 1 mg (has no administration in time range)   dextrose 10 % infusion (has no administration in time range)   insulin lispro (HUMALOG,ADMELOG) injection vial 0-4 Units ( SubCUTAneous Not Given 7/15/24 1752)   insulin lispro (HUMALOG,ADMELOG) injection vial 0-4 Units (has no administration in time range)   HYDROmorphone (DILAUDID) injection 0.5 mg (has no administration in time range)     Or   HYDROmorphone HCl PF (DILAUDID) injection 1 mg (has no administration in time range)   gadobenate dimeglumine (MULTIHANCE) injection 17 mL (has no administration in time range)   fentaNYL (SUBLIMAZE) injection 50 mcg (50 mcg IntraVENous Given 7/15/24 1426)   iopamidol

## 2024-07-16 NOTE — PROGRESS NOTES
Occupational Therapy  OT consult received to eval/treat and chart review complete. Patient reports he is functioning at baseline level of independent, family present and agrees. Patient reports he has been up independently since admission and has no concerns related to functional abilities. Patient politely declines need for OT evaluation at this time. No OT evaluation complete per patient preference.      Yana Armenta, OTR/L  License Number: [id number]

## 2024-07-16 NOTE — CONSULTS
Palliative Care Department  [phone]  Palliative Care Initial Consult  Provider Keara Florian, APRN - CNP     Luis Seo  [id number]  Hospital Day: 2  Date of Initial Consult: 7/15/2024  Referring Provider: Matt Mistry MD  Palliative Medicine was consulted for assistance with: goals of care    HPI:   Luis Seo is a 74 y.o. with a medical history of cancer, Guillain-Barré syndrome, PE, RCC s/p ablation, metastatic pancreatic cancer s/p open distal pancreatectomy with splenectomy at [facility] who was admitted on 7/15/2024 from home with a CHIEF COMPLAINT of abdominal pain, chest pain.  The patient was recently diagnosed with metastatic pancreatic cancer.  There was concern for aortic dissection.  CTA chest showed no evidence of thoracoabdominal aortic aneurysm or dissection.  No evidence of pulmonary embolism. CT abdomen pelvis showed 4.4 cm pancreatic head lesion with multiple hepatic lesions which are consistent with metastatic pancreatic carcinoma.  Lab work in ED showed sodium 138, potassium 3.4, bicarb 21, BUN/creatinine 11/0.7, lactic acid 3.2, blood glucose 179, troponin elevated to 26, proBNP 283, LFT , , , total bilirubin 1.5, Lipase 1999.  General surgery consulted.  MRCP completed.  Palliative medicine consulted for further assistance.    ASSESSMENT/PLAN:     Pertinent Hospital Diagnoses     Metastatic pancreatic cancer s/p distal pancreatectomy with splenectomy  Left renal carcinoma s/p left renal ablation  Elevated liver enzymes  History of pulmonary embolism      Palliative Care Encounter / Counseling Regarding Goals of Care  Please see detailed goals of care discussion as below  At this time, Luis Seo, Does have capacity for medical decision-making.  Capacity is time limited and situation/question specific  During encounter Tiki was surrogate medical decision-maker  Outcome of goals of care meeting:   Continue current medical management  Discussed CODE STATUS

## 2024-07-16 NOTE — PROGRESS NOTES
[facility] Hospitalist Progress Note    SYNOPSIS: Patient admitted on 7/15/2024 for Pancreatitis    Mr. Luis Seo, a 74 y.o. year old male  who  has a past medical history of Cancer (HCC), Guillain Barré syndrome (HCC), and Pulmonary embolism (HCC).      This patient presented to [facility] emergency room on 7/15/2024 for unrelenting abdominal pain radiating to back.  He was recently diagnosed with metastatic pancreatic cancer and has been following up with oncology service at [facility] back in March 2024.  There was a concern for aortic dissection.  CTA chest with contrast no evidence of thoracoabdominal aortic aneurysm or dissection.  No evidence of pulmonary embolism.  CT abdomen pelvis showed 4.4 cm pancreatic head lesion with multiple hepatic lesions which are consistent with metastatic pancreatic carcinoma.  Lab work sodium 138, potassium 3.4, bicarb 21, BUN/creatinine 11/0.7, lactic acid 3.2, blood glucose 179, troponin elevated to 26, proBNP 283  LFT , , , total bilirubin 1.5  Lipase 1999  Patient states that he is pain is well-controlled with IV Dilaudid.  Discussed with him and his daughter at bedside about chemo and radiation however patient does not want therapy started.  General surgery has been consulted from ED.  Patient will be admitted for further evaluation and workup.    7/16 MRI abdomen reviewed no sign of cholecystitis, choledocholithiasis.  Gallbladder mildly distended.  Postsurgical changes of distal pancreatectomy and splenectomy with findings of minimal hypoenhancement and artifact near the surgical margin sided pancreatic neck/body junction with previously noted fluid density from 4/26/2024.  General surgery has consulted hepatobiliary surgery.  Await oncology recommendations.  Palliative care has started him on tramadol every 4 hour.  Patient does seem to have pain control with Dilaudid every 12 hour as

## 2024-07-16 NOTE — CONSULTS
tablet by mouth nightly as needed (for sleep) 5/6/24   Giuseppe Church MD   ondansetron (ZOFRAN) 4 MG tablet Take 1 tablet by mouth every 8 hours as needed for Nausea or Vomiting    Provider, MD Daryl       Allergies  Allergies   Allergen Reactions    Gabapentin Rash       Review of Systems:    As in HPI above, otherwise negative.       Objective  BP (!) 154/75   Pulse 73   Temp 97.2 °F (36.2 °C) (Temporal)   Resp 16   Ht 1.778 m (5' 10\")   Wt 81.6 kg (180 lb)   SpO2 95%   BMI 25.83 kg/m²     Physical Exam:    General: AAO to person, place, time, in no acute distress,   Head and neck : PERRLA, EOMI . Sclera non icteric.  Oropharynx : Clear  Neck: no JVD,  no adenopathy  Heart: Regular rate and regular rhythm  Lungs: Clear to auscultation   Extremities: No edema,no cyanosis  Abdomen: Soft, mild epigastric tenderness, no organomegaly  Skin:  No rash.  Neurologic:Cranial nerves grossly intact. No focal motor or sensory deficits .    Recent Laboratory Data-   Lab Results   Component Value Date    WBC 10.7 07/15/2024    HGB 11.6 (L) 07/15/2024    HCT 35.7 (L) 07/15/2024    MCV 81.3 07/15/2024     07/15/2024    LYMPHOPCT 19 (L) 07/15/2024    RBC 4.39 07/15/2024    MCH 26.4 07/15/2024    MCHC 32.5 07/15/2024    RDW 17.3 (H) 07/15/2024    NEUTOPHILPCT 69 07/15/2024    MONOPCT 9 07/15/2024    EOSPCT 1 07/15/2024    BASOPCT 1 07/15/2024    NEUTROABS 7.36 (H) 07/15/2024    LYMPHSABS 2.07 07/15/2024    MONOSABS 0.96 (H) 07/15/2024    EOSABS 0.06 07/15/2024    BASOSABS 0.10 07/15/2024       Lab Results   Component Value Date     07/15/2024    K 3.4 (L) 07/15/2024     07/15/2024    CO2 21 (L) 07/15/2024    BUN 11 07/15/2024    CREATININE 0.7 07/15/2024    GLUCOSE 179 (H) 07/15/2024    CALCIUM 9.9 07/15/2024    BILITOT 1.5 (H) 07/15/2024    ALKPHOS 100 07/15/2024     (H) 07/15/2024     (H) 07/15/2024    LABGLOM >90 07/15/2024       No results found for: \"IRON\", \"TIBC\",  There is no evidence of pleural effusion or pneumothorax.  Multiple scattered bilateral densely calcified pulmonary nodules are again identified.  There is no evidence of noncalcified pulmonary nodule.  No airspace or interstitial lung disease is identified.  The tracheobronchial tree is fairly well preserved. Pre contrast imaging reveals no evidence of displaced intimal plaque within the thoracic aorta and no evidence of Ana aortic hematoma. Postcontrast enhanced imaging reveals no evidence of axillary, supraclavicular, mediastinal or hilar lymph node enlargement.  There is no evidence of thoracic aortic aneurysm or dissection.  Coronary arterial atherosclerotic calcifications are present.  No pericardial or mediastinal effusion is identified. There are multiple solid low-attenuation foci within the hepatic parenchyma, including a 16 mm lesion in the anteroinferior right hepatic lobe on axial slice 139/280, a posterior right hepatic lobe lesion on axial slice 125 which measures 1.8 cm, a medial right hepatic lobe lesion adjacent to the elan hepatis measures 1.4 cm on axial slice 112, a 1.5 cm lesion in the posterior right hepatic lobe on axial slice 113, a 1.4 cm lesion in the posterolateral right hepatic lobe on axial slice 109, a 3.1 cm low-attenuation lesion in the caudate lobe on axial slice 107 with adjacent mildly enlarged lymph nodes, a subcapsular lesion measuring 1.3 cm in the anterior left hepatic lobe on axial slice 103 and multiple subcentimeter hepatic lesions also worrisome for metastases. There is a 4.4 x 3.5 cm low-attenuation lesion in the pancreatic head with adjacent surgical clips.  There is no evidence of pancreatic ductal dilation. Bilateral renal cystic lesions are grossly unchanged.  There is no evidence of hydronephrosis or hydroureter.  Bilateral total hip arthroplasties are present with metal artifact.  There is prostatomegaly.  Urinary bladder is grossly unremarkable.  There is a small

## 2024-07-16 NOTE — ACP (ADVANCE CARE PLANNING)
Advance Care Planning   The patient has the following advanced directives on file:  Advance Directives       Power of  Living Will ACP-Advance Directive ACP-Power of     Not on File Filed on 04/26/24 Filed Not on File            The patient has appointed the following active healthcare agents:    Primary Decision Maker: Dee Elias - Child - [phone]    Secondary Decision Maker: argentina keller - Child - [phone]      MIRIAN Fernandez  7/16/2024

## 2024-07-16 NOTE — PROGRESS NOTES
4 Eyes Skin Assessment     NAME:  Luis Seo  YOB: 1950  MEDICAL RECORD NUMBER:  [id number]    The patient is being assessed for  Admission    I agree that at least one RN has performed a thorough Head to Toe Skin Assessment on the patient. ALL assessment sites listed below have been assessed.      Areas assessed by both nurses:    Head, Face, Ears, Shoulders, Back, Chest, Arms, Elbows, Hands, Sacrum. Buttock, Coccyx, Ischium, Legs. Feet and Heels, and Under Medical Devices         Does the Patient have a Wound? No noted wound(s)       Eyad Prevention initiated by RN: No  Wound Care Orders initiated by RN: No    Pressure Injury (Stage 3,4, Unstageable, DTI, NWPT, and Complex wounds) if present, place Wound referral order by RN under : No    New Ostomies, if present place, Ostomy referral order under : No     Nurse 1 eSignature: Electronically signed by Jcarlos Jones RN on 7/16/24 at 6:29 PM EDT    **SHARE this note so that the co-signing nurse can place an eSignature**    Nurse 2 eSignature: Electronically signed by Becky Khan RN on 7/16/24 at 7:29 PM EDT

## 2024-07-16 NOTE — CARE COORDINATION
Social Work/Case Management Transition of Care Planning (Shazia VELA [phone]):  Patient presented to the hospital due to concerns of abdominal pain and chest pain.  He was admitted with acute pancreatitis.  He was diagnosed with metastatic pancreatic cancer in March 2024.  General surgery was consulted. MRCP was completed.  Trend LFTs.  Patient is on IV Zosyn q8.  Oncology and Palliative Care have been consulted.  Met with patient and his daughter at bedside.  Patient resides in a one story home with 0 MORE.  He lives alone.  Patient reports he is independent with all aspects of care.  He has a SPC, wheelchair, and shower chair.  No oxygen needs in the home.  PCP is Dr. Oneill.  Pharmacy is [facility] in [place].  No C history.  He was active with [facility] but is no longer under hospice care.  G[facility]ALO [facility] at [facility] but he only stayed one night.  Discharge plan is to home with no needs.  CM/SW will follow.  MIRIAN Fernandez  7/16/2024    Case Management Assessment  Initial Evaluation    Date/Time of Evaluation: 7/16/2024 3:22 PM  Assessment Completed by: MIRIAN Fernandez    If patient is discharged prior to next notation, then this note serves as note for discharge by case management.    Patient Name: Luis Seo                   YOB: 1950  Diagnosis: Acute gallstone pancreatitis [K85.10]  Acute pancreatitis, unspecified complication status, unspecified pancreatitis type [K85.90]                   Date / Time: 7/15/2024  1:56 PM    Patient Admission Status: Inpatient   Readmission Risk (Low < 19, Mod (19-27), High > 27): Readmission Risk Score: 18.9    Current PCP: Reginald Oneill MD  PCP verified by CM? Yes    Chart Reviewed: Yes      History Provided by: Patient  Patient Orientation: Alert and Oriented, Person, Place, Situation, Self    Patient Cognition: Alert    Hospitalization in the last 30 days (Readmission):  No    If yes, Readmission Assessment

## 2024-07-16 NOTE — CONSULTS
Attending Physician Statement:    Chief Complaint:   Chief Complaint   Patient presents with    Abdominal Pain    Chest Pain     Abdominal pain radiating to chest.  Patient took 1 Nitro and 1 Reg Aspirin at home.  Screaming in pain on arrival  & hypotensive according to EMS.       I have examined the patient and performed the key aspects of physical exam, reviewed the record (including all pertinent and new radiology images and laboratory findings), and discussed the case with the surgical team.  I agree with the assessment and plan with the following additions, corrections, and changes. 14pt review of symptoms completed and negative except as mentioned.    Patient is a 74-year-old male who was seen at the [facility] incidentally found to have a pancreatic mass when evaluating for a renal cell carcinoma on scans.  His surgeon is Dr. Hyman at [facility] who performed distal pancreatectomy and splenectomy as preoperative biopsies only showed atypical cells In March of this year.  Postop he had wound complications and developed Guillain-Barré syndrome.  Started having symptoms of abdominal pain the last couple days.  Pathology from his surgery was consistent with positive margins which was known at the time of surgery due to vein involvement.  Clips were placed on the vein for potential radiation postoperatively.  Patient is stated that he is unsure if he wants to undergo chemo.  His most recent CT scan from the emergency room shows multiple new liver lesions.  He does have a small fluid collection along the cut margin of the pancreas however this is not big enough to place a drain.  Concern is for progression of disease given positive margins and unable to get systemic treatment with postoperative complications.    His medical oncologist is Dr. Trammell.  He is willing to have a liver biopsy done to confirm metastatic disease.  I discussed with the patient that at this point he has stage IV pancreatic cancer which

## 2024-07-16 NOTE — ACP (ADVANCE CARE PLANNING)
Advance Care Planning      Palliative Medicine Provider (MD/NP)  Advance Care Planning (ACP) Conversation      Date of Conversation: 07/16/24  The patient and/or authorized decision maker consented to a voluntary Advance Care Planning conversation.   Individuals present for the conversation:   Patient with decision making capacity and Daughter Tiki    Legal Healthcare Agent(s):    Primary Decision Maker: Dee Elias - Child - [phone]    Secondary Decision Maker: tiki keller - Child - [phone]    ACP documents available in EMR prior to discussion:  -Power of  for Healthcare  -Living Will    Primary Palliative Diagnosis(es):  Metastatic pancreatic cancer s/p distal pancreatectomy with splenectomy  Left renal carcinoma s/p left renal ablation  History of Guillain-Barré syndrome    Conversation Summary:   Patient seen at the bedside, alert and oriented x 4.  Patient able to partake in meaningful conversation and able to make decisions for himself.  Patient's daughter, Tiki, present at the bedside. The patient has advanced directives listed within our records.  Dee is the patient's primary decision maker and Tiki is the patient's secondary decision maker.      Resuscitation Status:    Code Status: Limited    Outcomes / Completed Documentation:  An explanation of advance directives and their importance was provided and the following forms completed:    -No new documents completed.    If new document completed, original was provided to patient and/or family member.    Copy was placed for scanning into the [facility] EMR.      I spent 30 minutes providing separately identifiable ACP services with the patient and/or surrogate decision maker in a voluntary, in-person conversation discussing the patient's wishes and goals as detailed in the above note.       Keara Florian, APRN - CNP

## 2024-07-16 NOTE — PROGRESS NOTES
GENERAL SURGERY  DAILY PROGRESS NOTE  7/16/2024    Chief Complaint   Patient presents with    Abdominal Pain    Chest Pain     Abdominal pain radiating to chest.  Patient took 1 Nitro and 1 Reg Aspirin at home.  Screaming in pain on arrival  & hypotensive according to EMS.       Subjective:  Feeling much better this AM. No nausea or emesis. Little to no epigastric pain. Passing some flatus     Objective:  BP (!) 148/68   Pulse 72   Temp 97.7 °F (36.5 °C) (Oral)   Resp 18   Ht 1.778 m (5' 10\")   Wt 81.6 kg (180 lb)   SpO2 97%   BMI 25.83 kg/m²     GENERAL:  NAD. A&Ox3.  HEAD:  Normocephalic. Atraumatic.   EYES:   No scleral icterus. PERRL.  LUNGS:  No increased work of breathing.  CARDIOVASCULAR: RR  ABDOMEN:  Soft, non distended. Epigastric tenderness   EXTREMITIES:   MAEx4. Atraumatic. No LE edema.  SKIN:  Warm and dry    Assessment/Plan:  74 y.o. male with pancreatic body cancer, left renal carcinoma s/p left renal ablation, distal panc spleen in March 2024 now with concern for pancreatitis     MRI without evidence of cholelithiasis or choledocholithiasis, no ductal dilation or filling defect to suggest gallstone pancreatitis   Imaging concerning for possible hepatic mets   AM LFTs pending   Onc recs pending       Electronically signed by Becky Maddox MD on 7/16/2024 at 6:22 AM

## 2024-07-17 PROBLEM — C78.7 PANCREATIC CARCINOMA METASTATIC TO LIVER (HCC): Status: ACTIVE | Noted: 2024-07-17

## 2024-07-17 PROBLEM — C25.9 PANCREATIC CARCINOMA METASTATIC TO LIVER (HCC): Status: ACTIVE | Noted: 2024-07-17

## 2024-07-17 PROBLEM — Z51.5 PALLIATIVE CARE ENCOUNTER: Status: ACTIVE | Noted: 2024-07-17

## 2024-07-17 LAB
ALBUMIN SERPL-MCNC: 3.3 G/DL (ref 3.5–5.2)
ALP SERPL-CCNC: 136 U/L (ref 40–129)
ALT SERPL-CCNC: 209 U/L (ref 0–40)
ANION GAP SERPL CALCULATED.3IONS-SCNC: 9 MMOL/L (ref 7–16)
AST SERPL-CCNC: 108 U/L (ref 0–39)
BASOPHILS # BLD: 0.08 K/UL (ref 0–0.2)
BASOPHILS NFR BLD: 1 % (ref 0–2)
BILIRUB SERPL-MCNC: 0.6 MG/DL (ref 0–1.2)
BUN SERPL-MCNC: 9 MG/DL (ref 6–23)
CALCIUM SERPL-MCNC: 9.7 MG/DL (ref 8.6–10.2)
CHLORIDE SERPL-SCNC: 106 MMOL/L (ref 98–107)
CO2 SERPL-SCNC: 25 MMOL/L (ref 22–29)
CREAT SERPL-MCNC: 0.6 MG/DL (ref 0.7–1.2)
EOSINOPHIL # BLD: 0.43 K/UL (ref 0.05–0.5)
EOSINOPHILS RELATIVE PERCENT: 5 % (ref 0–6)
ERYTHROCYTE [DISTWIDTH] IN BLOOD BY AUTOMATED COUNT: 17.8 % (ref 11.5–15)
GFR, ESTIMATED: >90 ML/MIN/1.73M2
GLUCOSE BLD-MCNC: 113 MG/DL (ref 74–99)
GLUCOSE BLD-MCNC: 113 MG/DL (ref 74–99)
GLUCOSE BLD-MCNC: 133 MG/DL (ref 74–99)
GLUCOSE BLD-MCNC: 164 MG/DL (ref 74–99)
GLUCOSE SERPL-MCNC: 104 MG/DL (ref 74–99)
HCT VFR BLD AUTO: 34.7 % (ref 37–54)
HGB BLD-MCNC: 10.9 G/DL (ref 12.5–16.5)
IMM GRANULOCYTES # BLD AUTO: 0.03 K/UL (ref 0–0.58)
IMM GRANULOCYTES NFR BLD: 0 % (ref 0–5)
INR PPP: 1.4
LIPASE SERPL-CCNC: 37 U/L (ref 13–60)
LYMPHOCYTES NFR BLD: 1.65 K/UL (ref 1.5–4)
LYMPHOCYTES RELATIVE PERCENT: 19 % (ref 20–42)
MAGNESIUM SERPL-MCNC: 1.8 MG/DL (ref 1.6–2.6)
MCH RBC QN AUTO: 25.9 PG (ref 26–35)
MCHC RBC AUTO-ENTMCNC: 31.4 G/DL (ref 32–34.5)
MCV RBC AUTO: 82.4 FL (ref 80–99.9)
MONOCYTES NFR BLD: 1.06 K/UL (ref 0.1–0.95)
MONOCYTES NFR BLD: 12 % (ref 2–12)
NEUTROPHILS NFR BLD: 63 % (ref 43–80)
NEUTS SEG NFR BLD: 5.51 K/UL (ref 1.8–7.3)
PHOSPHATE SERPL-MCNC: 3 MG/DL (ref 2.5–4.5)
PLATELET # BLD AUTO: 237 K/UL (ref 130–450)
PMV BLD AUTO: 11.8 FL (ref 7–12)
POTASSIUM SERPL-SCNC: 3.3 MMOL/L (ref 3.5–5)
PROT SERPL-MCNC: 5.9 G/DL (ref 6.4–8.3)
PROTHROMBIN TIME: 15.3 SEC (ref 9.3–12.4)
RBC # BLD AUTO: 4.21 M/UL (ref 3.8–5.8)
SODIUM SERPL-SCNC: 140 MMOL/L (ref 132–146)
WBC OTHER # BLD: 8.8 K/UL (ref 4.5–11.5)

## 2024-07-17 PROCEDURE — 6370000000 HC RX 637 (ALT 250 FOR IP): Performed by: NURSE PRACTITIONER

## 2024-07-17 PROCEDURE — 6370000000 HC RX 637 (ALT 250 FOR IP): Performed by: STUDENT IN AN ORGANIZED HEALTH CARE EDUCATION/TRAINING PROGRAM

## 2024-07-17 PROCEDURE — 85610 PROTHROMBIN TIME: CPT

## 2024-07-17 PROCEDURE — 1200000000 HC SEMI PRIVATE

## 2024-07-17 PROCEDURE — 85025 COMPLETE CBC W/AUTO DIFF WBC: CPT

## 2024-07-17 PROCEDURE — 80053 COMPREHEN METABOLIC PANEL: CPT

## 2024-07-17 PROCEDURE — 82962 GLUCOSE BLOOD TEST: CPT

## 2024-07-17 PROCEDURE — 99232 SBSQ HOSP IP/OBS MODERATE 35: CPT | Performed by: STUDENT IN AN ORGANIZED HEALTH CARE EDUCATION/TRAINING PROGRAM

## 2024-07-17 PROCEDURE — 36415 COLL VENOUS BLD VENIPUNCTURE: CPT

## 2024-07-17 PROCEDURE — 6360000002 HC RX W HCPCS: Performed by: STUDENT IN AN ORGANIZED HEALTH CARE EDUCATION/TRAINING PROGRAM

## 2024-07-17 PROCEDURE — 83690 ASSAY OF LIPASE: CPT

## 2024-07-17 PROCEDURE — 2580000003 HC RX 258: Performed by: STUDENT IN AN ORGANIZED HEALTH CARE EDUCATION/TRAINING PROGRAM

## 2024-07-17 PROCEDURE — 99222 1ST HOSP IP/OBS MODERATE 55: CPT | Performed by: STUDENT IN AN ORGANIZED HEALTH CARE EDUCATION/TRAINING PROGRAM

## 2024-07-17 PROCEDURE — 99231 SBSQ HOSP IP/OBS SF/LOW 25: CPT | Performed by: NURSE PRACTITIONER

## 2024-07-17 PROCEDURE — 83735 ASSAY OF MAGNESIUM: CPT

## 2024-07-17 PROCEDURE — 84100 ASSAY OF PHOSPHORUS: CPT

## 2024-07-17 RX ORDER — POTASSIUM CHLORIDE 20 MEQ/1
40 TABLET, EXTENDED RELEASE ORAL ONCE
Status: COMPLETED | OUTPATIENT
Start: 2024-07-17 | End: 2024-07-17

## 2024-07-17 RX ORDER — MAGNESIUM SULFATE IN WATER 40 MG/ML
2000 INJECTION, SOLUTION INTRAVENOUS ONCE
Status: COMPLETED | OUTPATIENT
Start: 2024-07-17 | End: 2024-07-17

## 2024-07-17 RX ADMIN — TAMSULOSIN HYDROCHLORIDE 0.4 MG: 0.4 CAPSULE ORAL at 08:17

## 2024-07-17 RX ADMIN — SODIUM CHLORIDE, PRESERVATIVE FREE 10 ML: 5 INJECTION INTRAVENOUS at 08:18

## 2024-07-17 RX ADMIN — POTASSIUM BICARBONATE 40 MEQ: 782 TABLET, EFFERVESCENT ORAL at 08:17

## 2024-07-17 RX ADMIN — SENNOSIDES AND DOCUSATE SODIUM 1 TABLET: 50; 8.6 TABLET ORAL at 08:18

## 2024-07-17 RX ADMIN — SODIUM CHLORIDE, PRESERVATIVE FREE 10 ML: 5 INJECTION INTRAVENOUS at 21:04

## 2024-07-17 RX ADMIN — PANCRELIPASE LIPASE, PANCRELIPASE PROTEASE, PANCRELIPASE AMYLASE 80000 UNITS: 20000; 63000; 84000 CAPSULE, DELAYED RELEASE ORAL at 12:05

## 2024-07-17 RX ADMIN — HYDROCODONE BITARTRATE AND ACETAMINOPHEN 2 TABLET: 5; 325 TABLET ORAL at 18:24

## 2024-07-17 RX ADMIN — PANCRELIPASE LIPASE, PANCRELIPASE PROTEASE, PANCRELIPASE AMYLASE 80000 UNITS: 20000; 63000; 84000 CAPSULE, DELAYED RELEASE ORAL at 08:17

## 2024-07-17 RX ADMIN — PANCRELIPASE LIPASE, PANCRELIPASE PROTEASE, PANCRELIPASE AMYLASE 80000 UNITS: 20000; 63000; 84000 CAPSULE, DELAYED RELEASE ORAL at 17:41

## 2024-07-17 RX ADMIN — MAGNESIUM SULFATE HEPTAHYDRATE 2000 MG: 40 INJECTION, SOLUTION INTRAVENOUS at 09:52

## 2024-07-17 RX ADMIN — POTASSIUM CHLORIDE 40 MEQ: 1500 TABLET, EXTENDED RELEASE ORAL at 09:46

## 2024-07-17 RX ADMIN — ZOLPIDEM TARTRATE 10 MG: 5 TABLET, COATED ORAL at 21:03

## 2024-07-17 ASSESSMENT — PAIN SCALES - GENERAL
PAINLEVEL_OUTOF10: 6
PAINLEVEL_OUTOF10: 0

## 2024-07-17 NOTE — PROGRESS NOTES
HEPATOBILIARY SURGERY   Daily Progress Note      Patient's Name/Date of Birth: Luis Seo / 1950    Date: July 17, 2024     Chief Complaint: r/o gallstone pancreatitis    Patient Active Problem List   Diagnosis    Acute pulmonary embolism, unspecified pulmonary embolism type, unspecified whether acute cor pulmonale present (HCC)    Pancreatitis    Malignant neoplasm of head of pancreas (HCC)    ACP (advance care planning)       Subjective: Pt is tolerating PO but has decreased appetite. He is passing flatus but has not had a BM  ON. Denies abdomina pain, n/v.     Objective:  BP (!) 140/70   Pulse 60   Temp 98.1 °F (36.7 °C) (Temporal)   Resp 18   Ht 1.778 m (5' 10\")   Wt 81.6 kg (180 lb)   SpO2 96%   BMI 25.83 kg/m²   Labs:  Recent Labs     07/15/24  1415 07/16/24  0900 07/17/24  0454   WBC 10.7 8.4 8.8   HGB 11.6* 11.1* 10.9*   HCT 35.7* 36.1* 34.7*     Recent Labs     07/15/24  1415 07/16/24  0900 07/17/24  0454    139 140   K 3.4* 3.5 3.3*    107 106   CO2 21* 24 25   BUN 11 10 9   CREATININE 0.7 0.6* 0.6*     Recent Labs     07/15/24  1415 07/16/24  0900 07/17/24  0454   ALKPHOS 100 120 136*   * 268* 209*   * 209* 108*   BILITOT 1.5* 1.1 0.6   LIPASE 1,999*  --  37         General appearance: NAD  Head: NCAT, PERRL, EOMI, pink conjunctiva  Neck: supple, no masses  Lungs: symmetric chest rise, no audible wheezes  Heart: warm throughout  Abdomen: soft, non-distended, non-tender to palpation throughout  Skin: no visible lesions  Extremities: extremities normal, atraumatic, no cyanosis or edema      Assessment/Plan:  Luis Seo is a 74 y.o. male with pancreatic body cancer, left renal carcinoma s/p left renal ablation, distal panc spleen in March 2024 at [facility]. Now with pancreatitis and concern for progression of disease      Plan:  - MRCP 7/15: Gallbladder mildly distended however no evidence for cholelithiasis or choledocholithiasis. No dilation or filling defect.

## 2024-07-17 NOTE — PROGRESS NOTES
Date: 2024       Patient Name: Luis Seo  : 1950      MRN: [id number]    PT order received. Chart has been reviewed. PT evaluation not completed. Patient Ind in the room for mobility without device. Pleasantly declined PT services. Will continue to follow and complete evaluation at later time if required for D/C planning. .     Trav Breen, PT

## 2024-07-17 NOTE — PROCEDURES
PROCEDURE NOTE  Date: 7/17/2024   Name: Luis Seo  YOB: 1950    Procedures: Liver Biopsy  Discussed patient and IR procedure with bedside RN, all questions answered. Plan is for 7/18, NPO order is already placed, will need a new PT/INR drawn per ordering physician.

## 2024-07-17 NOTE — CARE COORDINATION
Social Work/Case Management Transition of Care Planning (Shazia Elaine -[phone]):  Per report and chart review, MRCP showed lesions in the right hepatic lobe.  Plan is for liver biopsy tomorrow.  Oncology is following. Palliative care is following for pain management and goals of care. Met with patient at bedside.  Discharge plan is to home with no needs. CM/SW will follow.  Shazia Elaine, MIRIAN  7/17/2024

## 2024-07-17 NOTE — PROGRESS NOTES
[facility] Hospitalist Progress Note    SYNOPSIS: Patient admitted on 7/15/2024 for Pancreatitis    Mr. Luis Seo, a 74 y.o. year old male  who  has a past medical history of Cancer (HCC), Guillain Barré syndrome (HCC), and Pulmonary embolism (HCC).      This patient presented to [facility] emergency room on 7/15/2024 for unrelenting abdominal pain radiating to back.  He was recently diagnosed with metastatic pancreatic cancer and has been following up with oncology service at [facility] back in March 2024.  There was a concern for aortic dissection.  CTA chest with contrast no evidence of thoracoabdominal aortic aneurysm or dissection.  No evidence of pulmonary embolism.  CT abdomen pelvis showed 4.4 cm pancreatic head lesion with multiple hepatic lesions which are consistent with metastatic pancreatic carcinoma.  Lab work sodium 138, potassium 3.4, bicarb 21, BUN/creatinine 11/0.7, lactic acid 3.2, blood glucose 179, troponin elevated to 26, proBNP 283  LFT , , , total bilirubin 1.5  Lipase 1999  Patient states that he is pain is well-controlled with IV Dilaudid.  Discussed with him and his daughter at bedside about chemo and radiation however patient does not want therapy started.  General surgery has been consulted from ED.  Patient will be admitted for further evaluation and workup.    7/16 MRI abdomen reviewed no sign of cholecystitis, choledocholithiasis.  Gallbladder mildly distended.  Postsurgical changes of distal pancreatectomy and splenectomy with findings of minimal hypoenhancement and artifact near the surgical margin sided pancreatic neck/body junction with previously noted fluid density from 4/26/2024.  General surgery has consulted hepatobiliary surgery.  Await oncology recommendations.  Palliative care has started him on tramadol every 4 hour.  Patient does seem to have pain control with Dilaudid every 12 hour as needed.  7/17  times per day    nicotine  1 patch TransDERmal Daily    tamsulosin  0.4 mg Oral Daily    insulin lispro  0-4 Units SubCUTAneous TID WC    insulin lispro  0-4 Units SubCUTAneous Nightly     PRN Meds: calcium carbonate, HYDROcodone 5 mg - acetaminophen **OR** HYDROcodone 5 mg - acetaminophen, sodium chloride flush, sodium chloride, potassium chloride **OR** potassium alternative oral replacement **OR** potassium chloride, magnesium sulfate, polyethylene glycol, acetaminophen **OR** acetaminophen, zolpidem, prochlorperazine **OR** prochlorperazine, glucose, dextrose bolus **OR** dextrose bolus, glucagon (rDNA), dextrose, HYDROmorphone **OR** HYDROmorphone    Labs:     Recent Labs     07/15/24  1415 07/16/24  0900 07/17/24  0454   WBC 10.7 8.4 8.8   HGB 11.6* 11.1* 10.9*   HCT 35.7* 36.1* 34.7*    303 237       Recent Labs     07/15/24  1415 07/16/24  0900 07/17/24  0454    139 140   K 3.4* 3.5 3.3*    107 106   CO2 21* 24 25   BUN 11 10 9   CREATININE 0.7 0.6* 0.6*   CALCIUM 9.9 10.0 9.7   PHOS  --  3.1 3.0       Recent Labs     07/15/24  1415 07/16/24  0900 07/17/24  0454   ALKPHOS 100 120 136*   * 268* 209*   * 209* 108*   BILITOT 1.5* 1.1 0.6   LIPASE 1,999*  --  37       Recent Labs     07/15/24  1415   INR 2.5       No results for input(s): \"CKTOTAL\", \"TROPONINI\" in the last 72 hours.    Chronic labs:    Lab Results   Component Value Date    INR 2.5 07/15/2024    LABA1C 7.3 (H) 05/01/2024       Radiology: REVIEWED DAILY    +++++++++++++++++++++++++++++++++++++++++++++++++  Matt Mistry MD  [facility] \[facility]\""  [place], [place]  +++++++++++++++++++++++++++++++++++++++++++++++++  NOTE: This report was transcribed using voice recognition software. Every effort was made to ensure accuracy; however, inadvertent computerized transcription errors may be present.

## 2024-07-17 NOTE — PROGRESS NOTES
Palliative Care Department  [phone]  Palliative Care Progress Note  Provider Keara Florian, APRN - CNP     Luis Seo  [id number]  Hospital Day: 3  Date of Initial Consult: 7/15/2024  Referring Provider: Matt Mistry MD  Palliative Medicine was consulted for assistance with: goals of care    HPI:   Luis Seo is a 74 y.o. with a medical history of cancer, Guillain-Barré syndrome, PE, RCC s/p ablation, metastatic pancreatic cancer s/p open distal pancreatectomy with splenectomy at [facility] who was admitted on 7/15/2024 from home with a CHIEF COMPLAINT of abdominal pain, chest pain.  The patient was recently diagnosed with metastatic pancreatic cancer.  There was concern for aortic dissection.  CTA chest showed no evidence of thoracoabdominal aortic aneurysm or dissection.  No evidence of pulmonary embolism. CT abdomen pelvis showed 4.4 cm pancreatic head lesion with multiple hepatic lesions which are consistent with metastatic pancreatic carcinoma.  Lab work in ED showed sodium 138, potassium 3.4, bicarb 21, BUN/creatinine 11/0.7, lactic acid 3.2, blood glucose 179, troponin elevated to 26, proBNP 283, LFT , , , total bilirubin 1.5, Lipase 1999.  General surgery consulted.  MRCP completed.  Palliative medicine consulted for further assistance.    ASSESSMENT/PLAN:     Pertinent Hospital Diagnoses     Metastatic pancreatic cancer s/p distal pancreatectomy with splenectomy  Left renal carcinoma s/p left renal ablation  Elevated liver enzymes  History of pulmonary embolism      Palliative Care Encounter / Counseling Regarding Goals of Care  Please see detailed goals of care discussion as below  At this time, Luis Seo, Does have capacity for medical decision-making.  Capacity is time limited and situation/question specific  During encounter there was no surrogate medical decision-maker  Outcome of goals of care meeting:   Continue current medical management  Symptom

## 2024-07-18 ENCOUNTER — APPOINTMENT (OUTPATIENT)
Dept: CT IMAGING | Age: 74
DRG: 439 | End: 2024-07-18
Payer: MEDICARE

## 2024-07-18 VITALS
OXYGEN SATURATION: 96 % | BODY MASS INDEX: 25.77 KG/M2 | RESPIRATION RATE: 16 BRPM | DIASTOLIC BLOOD PRESSURE: 85 MMHG | HEIGHT: 70 IN | TEMPERATURE: 97 F | WEIGHT: 180 LBS | SYSTOLIC BLOOD PRESSURE: 144 MMHG | HEART RATE: 74 BPM

## 2024-07-18 LAB
ALBUMIN SERPL-MCNC: 3.3 G/DL (ref 3.5–5.2)
ALP SERPL-CCNC: 124 U/L (ref 40–129)
ALT SERPL-CCNC: 135 U/L (ref 0–40)
ANION GAP SERPL CALCULATED.3IONS-SCNC: 10 MMOL/L (ref 7–16)
AST SERPL-CCNC: 46 U/L (ref 0–39)
BASOPHILS # BLD: 0.08 K/UL (ref 0–0.2)
BASOPHILS NFR BLD: 1 % (ref 0–2)
BILIRUB SERPL-MCNC: 0.5 MG/DL (ref 0–1.2)
BUN SERPL-MCNC: 8 MG/DL (ref 6–23)
CALCIUM SERPL-MCNC: 9.7 MG/DL (ref 8.6–10.2)
CHLORIDE SERPL-SCNC: 105 MMOL/L (ref 98–107)
CO2 SERPL-SCNC: 26 MMOL/L (ref 22–29)
CREAT SERPL-MCNC: 0.6 MG/DL (ref 0.7–1.2)
EOSINOPHIL # BLD: 0.57 K/UL (ref 0.05–0.5)
EOSINOPHILS RELATIVE PERCENT: 8 % (ref 0–6)
ERYTHROCYTE [DISTWIDTH] IN BLOOD BY AUTOMATED COUNT: 17.6 % (ref 11.5–15)
GFR, ESTIMATED: >90 ML/MIN/1.73M2
GLUCOSE BLD-MCNC: 108 MG/DL (ref 74–99)
GLUCOSE BLD-MCNC: 140 MG/DL (ref 74–99)
GLUCOSE SERPL-MCNC: 115 MG/DL (ref 74–99)
HCT VFR BLD AUTO: 35.7 % (ref 37–54)
HGB BLD-MCNC: 11.4 G/DL (ref 12.5–16.5)
IMM GRANULOCYTES # BLD AUTO: <0.03 K/UL (ref 0–0.58)
IMM GRANULOCYTES NFR BLD: 0 % (ref 0–5)
INR PPP: 1.4
LYMPHOCYTES NFR BLD: 2.15 K/UL (ref 1.5–4)
LYMPHOCYTES RELATIVE PERCENT: 32 % (ref 20–42)
MAGNESIUM SERPL-MCNC: 2.2 MG/DL (ref 1.6–2.6)
MCH RBC QN AUTO: 26.5 PG (ref 26–35)
MCHC RBC AUTO-ENTMCNC: 31.9 G/DL (ref 32–34.5)
MCV RBC AUTO: 82.8 FL (ref 80–99.9)
MONOCYTES NFR BLD: 1.03 K/UL (ref 0.1–0.95)
MONOCYTES NFR BLD: 15 % (ref 2–12)
NEUTROPHILS NFR BLD: 44 % (ref 43–80)
NEUTS SEG NFR BLD: 2.99 K/UL (ref 1.8–7.3)
PHOSPHATE SERPL-MCNC: 3.3 MG/DL (ref 2.5–4.5)
PLATELET # BLD AUTO: 270 K/UL (ref 130–450)
PMV BLD AUTO: 12.1 FL (ref 7–12)
POTASSIUM SERPL-SCNC: 3.5 MMOL/L (ref 3.5–5)
PROT SERPL-MCNC: 6.1 G/DL (ref 6.4–8.3)
PROTHROMBIN TIME: 14.9 SEC (ref 9.3–12.4)
RBC # BLD AUTO: 4.31 M/UL (ref 3.8–5.8)
SODIUM SERPL-SCNC: 141 MMOL/L (ref 132–146)
WBC OTHER # BLD: 6.8 K/UL (ref 4.5–11.5)

## 2024-07-18 PROCEDURE — 99231 SBSQ HOSP IP/OBS SF/LOW 25: CPT

## 2024-07-18 PROCEDURE — 80053 COMPREHEN METABOLIC PANEL: CPT

## 2024-07-18 PROCEDURE — 82962 GLUCOSE BLOOD TEST: CPT

## 2024-07-18 PROCEDURE — 88341 IMHCHEM/IMCYTCHM EA ADD ANTB: CPT

## 2024-07-18 PROCEDURE — 6370000000 HC RX 637 (ALT 250 FOR IP): Performed by: STUDENT IN AN ORGANIZED HEALTH CARE EDUCATION/TRAINING PROGRAM

## 2024-07-18 PROCEDURE — 2580000003 HC RX 258: Performed by: STUDENT IN AN ORGANIZED HEALTH CARE EDUCATION/TRAINING PROGRAM

## 2024-07-18 PROCEDURE — 99239 HOSP IP/OBS DSCHRG MGMT >30: CPT | Performed by: STUDENT IN AN ORGANIZED HEALTH CARE EDUCATION/TRAINING PROGRAM

## 2024-07-18 PROCEDURE — 77012 CT SCAN FOR NEEDLE BIOPSY: CPT

## 2024-07-18 PROCEDURE — 83735 ASSAY OF MAGNESIUM: CPT

## 2024-07-18 PROCEDURE — 6370000000 HC RX 637 (ALT 250 FOR IP): Performed by: NURSE PRACTITIONER

## 2024-07-18 PROCEDURE — 2500000003 HC RX 250 WO HCPCS: Performed by: RADIOLOGY

## 2024-07-18 PROCEDURE — 85025 COMPLETE CBC W/AUTO DIFF WBC: CPT

## 2024-07-18 PROCEDURE — 84100 ASSAY OF PHOSPHORUS: CPT

## 2024-07-18 PROCEDURE — 0FB03ZX EXCISION OF LIVER, PERCUTANEOUS APPROACH, DIAGNOSTIC: ICD-10-PCS | Performed by: STUDENT IN AN ORGANIZED HEALTH CARE EDUCATION/TRAINING PROGRAM

## 2024-07-18 PROCEDURE — 36415 COLL VENOUS BLD VENIPUNCTURE: CPT

## 2024-07-18 PROCEDURE — 85610 PROTHROMBIN TIME: CPT

## 2024-07-18 PROCEDURE — 88342 IMHCHEM/IMCYTCHM 1ST ANTB: CPT

## 2024-07-18 PROCEDURE — 88307 TISSUE EXAM BY PATHOLOGIST: CPT

## 2024-07-18 PROCEDURE — 6360000002 HC RX W HCPCS: Performed by: RADIOLOGY

## 2024-07-18 RX ORDER — KETOROLAC TROMETHAMINE 30 MG/ML
INJECTION, SOLUTION INTRAMUSCULAR; INTRAVENOUS PRN
Status: COMPLETED | OUTPATIENT
Start: 2024-07-18 | End: 2024-07-18

## 2024-07-18 RX ORDER — LIDOCAINE HYDROCHLORIDE 20 MG/ML
INJECTION, SOLUTION INFILTRATION; PERINEURAL PRN
Status: COMPLETED | OUTPATIENT
Start: 2024-07-18 | End: 2024-07-18

## 2024-07-18 RX ORDER — MIDAZOLAM HYDROCHLORIDE 2 MG/2ML
INJECTION, SOLUTION INTRAMUSCULAR; INTRAVENOUS PRN
Status: COMPLETED | OUTPATIENT
Start: 2024-07-18 | End: 2024-07-18

## 2024-07-18 RX ORDER — FENTANYL CITRATE 50 UG/ML
INJECTION, SOLUTION INTRAMUSCULAR; INTRAVENOUS PRN
Status: COMPLETED | OUTPATIENT
Start: 2024-07-18 | End: 2024-07-18

## 2024-07-18 RX ORDER — HYDROCODONE BITARTRATE AND ACETAMINOPHEN 10; 325 MG/1; MG/1
1 TABLET ORAL EVERY 4 HOURS PRN
Qty: 42 TABLET | Refills: 0 | Status: SHIPPED | OUTPATIENT
Start: 2024-07-18 | End: 2024-07-24 | Stop reason: SDUPTHER

## 2024-07-18 RX ORDER — SENNA AND DOCUSATE SODIUM 50; 8.6 MG/1; MG/1
1 TABLET, FILM COATED ORAL DAILY PRN
Qty: 30 TABLET | Refills: 1 | Status: SHIPPED | OUTPATIENT
Start: 2024-07-18 | End: 2024-09-16

## 2024-07-18 RX ORDER — POTASSIUM CHLORIDE 20 MEQ/1
40 TABLET, EXTENDED RELEASE ORAL ONCE
Status: COMPLETED | OUTPATIENT
Start: 2024-07-18 | End: 2024-07-18

## 2024-07-18 RX ORDER — LIDOCAINE HYDROCHLORIDE AND EPINEPHRINE BITARTRATE 20; .01 MG/ML; MG/ML
INJECTION, SOLUTION SUBCUTANEOUS PRN
Status: COMPLETED | OUTPATIENT
Start: 2024-07-18 | End: 2024-07-18

## 2024-07-18 RX ORDER — HYDROCODONE BITARTRATE AND ACETAMINOPHEN 5; 325 MG/1; MG/1
1 TABLET ORAL EVERY 6 HOURS PRN
Qty: 10 TABLET | Refills: 0 | Status: SHIPPED | OUTPATIENT
Start: 2024-07-18 | End: 2024-07-23

## 2024-07-18 RX ORDER — DIPHENHYDRAMINE HYDROCHLORIDE 50 MG/ML
INJECTION INTRAMUSCULAR; INTRAVENOUS PRN
Status: COMPLETED | OUTPATIENT
Start: 2024-07-18 | End: 2024-07-18

## 2024-07-18 RX ADMIN — MIDAZOLAM HYDROCHLORIDE 0.5 MG: 1 INJECTION, SOLUTION INTRAMUSCULAR; INTRAVENOUS at 08:40

## 2024-07-18 RX ADMIN — KETOROLAC TROMETHAMINE 30 MG: 30 INJECTION, SOLUTION INTRAMUSCULAR; INTRAVENOUS at 08:57

## 2024-07-18 RX ADMIN — MIDAZOLAM HYDROCHLORIDE 0.5 MG: 1 INJECTION, SOLUTION INTRAMUSCULAR; INTRAVENOUS at 08:57

## 2024-07-18 RX ADMIN — FENTANYL CITRATE 25 MCG: 50 INJECTION, SOLUTION INTRAMUSCULAR; INTRAVENOUS at 08:45

## 2024-07-18 RX ADMIN — FENTANYL CITRATE 25 MCG: 50 INJECTION, SOLUTION INTRAMUSCULAR; INTRAVENOUS at 08:40

## 2024-07-18 RX ADMIN — PANCRELIPASE LIPASE, PANCRELIPASE PROTEASE, PANCRELIPASE AMYLASE 80000 UNITS: 20000; 63000; 84000 CAPSULE, DELAYED RELEASE ORAL at 11:20

## 2024-07-18 RX ADMIN — MIDAZOLAM HYDROCHLORIDE 0.5 MG: 1 INJECTION, SOLUTION INTRAMUSCULAR; INTRAVENOUS at 09:01

## 2024-07-18 RX ADMIN — DIPHENHYDRAMINE HYDROCHLORIDE 25 MG: 50 INJECTION, SOLUTION INTRAMUSCULAR; INTRAVENOUS at 08:40

## 2024-07-18 RX ADMIN — SODIUM CHLORIDE, PRESERVATIVE FREE 10 ML: 5 INJECTION INTRAVENOUS at 11:19

## 2024-07-18 RX ADMIN — FENTANYL CITRATE 25 MCG: 50 INJECTION, SOLUTION INTRAMUSCULAR; INTRAVENOUS at 09:01

## 2024-07-18 RX ADMIN — LIDOCAINE HYDROCHLORIDE 10 ML: 20 INJECTION, SOLUTION INFILTRATION; PERINEURAL at 08:44

## 2024-07-18 RX ADMIN — SENNOSIDES AND DOCUSATE SODIUM 1 TABLET: 50; 8.6 TABLET ORAL at 11:17

## 2024-07-18 RX ADMIN — TAMSULOSIN HYDROCHLORIDE 0.4 MG: 0.4 CAPSULE ORAL at 11:18

## 2024-07-18 RX ADMIN — LIDOCAINE HYDROCHLORIDE,EPINEPHRINE BITARTRATE 7 ML: 20; .01 INJECTION, SOLUTION INFILTRATION; PERINEURAL at 08:45

## 2024-07-18 RX ADMIN — MIDAZOLAM HYDROCHLORIDE 0.5 MG: 1 INJECTION, SOLUTION INTRAMUSCULAR; INTRAVENOUS at 08:45

## 2024-07-18 RX ADMIN — POTASSIUM CHLORIDE 40 MEQ: 1500 TABLET, EXTENDED RELEASE ORAL at 11:17

## 2024-07-18 RX ADMIN — FENTANYL CITRATE 25 MCG: 50 INJECTION, SOLUTION INTRAMUSCULAR; INTRAVENOUS at 08:57

## 2024-07-18 RX ADMIN — THROMBIN HUMAN 1 KIT: 2000 POWDER, FOR SOLUTION TOPICAL at 09:01

## 2024-07-18 NOTE — PROCEDURES
0905 Patient returned from liver biopsy. Report received from KHADAR Galindo. Dressing to right flank checked, clean, dry, and intact. Patient stable. No s/s of complications noted or reported. Vitals will be checked q 15min, see flow sheets. Last medications given at 0901. Patient will recover for at least 30 minutes after sedation medications. Patient placed in transport queue.    0937 Site was checked with every set of vitals. Site clean dry and intact. Patient in stable condition, no s/s of complications noted or reported. Patient taken back to inpatient room via Transport.

## 2024-07-18 NOTE — CARE COORDINATION
Met with patient at bedside to notify of follow-up appointment scheduled with Dr. Oneill. Patient verbalized understanding.    JUL 22 Appointment 3:45 PM  Dr. Reginald Oneill MD

## 2024-07-18 NOTE — PROCEDURES
0740 Patient arrived to Radiology department for liver biopsy. Vital signs taken. IV flushed and patent. Allergies, home medications, medical history, H&P and fasting instructions reviewed with patient. Paper chart reviewed for correct documents.    0750 Procedural instructions given, questions answered, understanding expressed and consent signed.

## 2024-07-18 NOTE — CARE COORDINATION
Social Work/Case Management Transition of Care Planning (Shazia Elaine -[phone]):  Per report and chart review, plan is for patient to have liver biopsy today.  Oncology is following.  Discharge order noted.  Met with patient at bedside.  Discharge is to home with no needs after his procedure.  Family/friend will transport.  CM/SW will follow.   Shazia Elaine, MIRIAN  7/18/2024

## 2024-07-18 NOTE — PRE-PROCEDURE INSTRUCTIONS
Special Procedures/Interventional Radiology Pre-Procedure Evaluation    Patient Name: Luis Seo  MRN: [id number]  : 1950  Date of Procedure: 24  Planned Procedure: Liver Biopsy    Is the patient alert, oriented, and capable of providing informed procedural consent? Yes  Has the patient adhered to NPO status since midnight? Yes  Are recent lab results within acceptable parameters to safely proceed with the procedure? Yes  Has medical/surgical/allergy history been reviewed? Yes  Have anticoagulant medications been appropriately withheld as per protocol? Yes  Is the patient receiving intravenous antibiotic therapy? No    I have discussed and reviewed this information with the patient's primary RN. Plan to proceed with the procedure today. However, the exact timing of the procedure cannot be specified at this moment. There is also a possibility that the procedure may be rescheduled due to the department's caseload and prioritization of cases.    The following is not an order:  Assigned inpatient RN to clarify and obtain orders from the provider that placed orders for the above procedure for the below requests:  Per IR physician Dr. Walker, to meet criteria for procedure:  Patient to remain NPO for procedure.  Hold all blood thinners for procedure. Resume per protocol following procedure completion.  IR team will call when able to send for patient.

## 2024-07-18 NOTE — PROGRESS NOTES
Palliative Care Department  [phone]  Palliative Care Progress Note  Provider Beatrice Lang, APRN - CNP     Luis Seo  [id number]  Hospital Day: 4  Date of Initial Consult: 7/15/2024  Referring Provider: Matt Mistry MD  Palliative Medicine was consulted for assistance with: goals of care    HPI:   Luis Seo is a 74 y.o. with a medical history of cancer, Guillain-Barré syndrome, PE, RCC s/p ablation, metastatic pancreatic cancer s/p open distal pancreatectomy with splenectomy at [facility] who was admitted on 7/15/2024 from home with a CHIEF COMPLAINT of abdominal pain, chest pain.  The patient was recently diagnosed with metastatic pancreatic cancer.  There was concern for aortic dissection.  CTA chest showed no evidence of thoracoabdominal aortic aneurysm or dissection.  No evidence of pulmonary embolism. CT abdomen pelvis showed 4.4 cm pancreatic head lesion with multiple hepatic lesions which are consistent with metastatic pancreatic carcinoma.  Lab work in ED showed sodium 138, potassium 3.4, bicarb 21, BUN/creatinine 11/0.7, lactic acid 3.2, blood glucose 179, troponin elevated to 26, proBNP 283, LFT , , , total bilirubin 1.5, Lipase 1999.  General surgery consulted.  MRCP completed.  Palliative medicine consulted for further assistance.    ASSESSMENT/PLAN:     Pertinent Hospital Diagnoses     Metastatic pancreatic cancer s/p distal pancreatectomy with splenectomy  Left renal carcinoma s/p left renal ablation  Elevated liver enzymes  History of pulmonary embolism      Palliative Care Encounter / Counseling Regarding Goals of Care  Please see detailed goals of care discussion as below  At this time, Luis Seo, Does have capacity for medical decision-making.  Capacity is time limited and situation/question specific  During encounter there was no surrogate medical decision-maker  Outcome of goals of care meeting:   Continue current medical management  Outpatient

## 2024-07-18 NOTE — PRE SEDATION
Sedation Pre-Procedure Note    Patient Name: Luis Seo   YOB: 1950  Room/Bed: 8418/8418-A  Medical Record Number: [id number]  Date: 7/18/2024   Time: 9:02 AM       Indication:  liver lesions    Consent: I have discussed with the patient and/or the patient representative the indication, alternatives, and the possible risks and/or complications of the planned procedure and the anesthesia methods. The patient and/or patient representative appear to understand and agree to proceed.    Vital Signs:   Vitals:    07/18/24 0859   BP: (!) 157/85   Pulse: 75   Resp: 22   Temp:    SpO2: 96%       Past Medical History:   has a past medical history of Cancer (HCC), Guillain Barré syndrome (HCC), and Pulmonary embolism (HCC).    Past Surgical History:   has a past surgical history that includes Total shoulder arthroplasty (Bilateral); joint replacement (Bilateral); joint replacement (Right); and Pancreas surgery (03/08/2024).    Medications:   Scheduled Meds:    potassium chloride  40 mEq Oral Once    sennosides-docusate sodium  1 tablet Oral Daily    lipase-protease-amylase  80,000 Units Oral TID WC    sodium chloride flush  5-40 mL IntraVENous 2 times per day    nicotine  1 patch TransDERmal Daily    tamsulosin  0.4 mg Oral Daily    insulin lispro  0-4 Units SubCUTAneous TID WC    insulin lispro  0-4 Units SubCUTAneous Nightly     Continuous Infusions:    sodium chloride      dextrose       PRN Meds: fentanNYL, midazolam, diphenhydrAMINE, lidocaine, lidocaine-EPINEPHrine, ketorolac, Surgiflo with Evithrom Hemostatic Matrix, calcium carbonate, HYDROcodone 5 mg - acetaminophen **OR** HYDROcodone 5 mg - acetaminophen, sodium chloride flush, sodium chloride, potassium chloride **OR** potassium alternative oral replacement **OR** potassium chloride, magnesium sulfate, polyethylene glycol, acetaminophen **OR** acetaminophen, zolpidem, prochlorperazine **OR** prochlorperazine, glucose, dextrose bolus **OR** dextrose  disease that is a constant threat to life    Sedation/ Anesthesia Plan:   intravenous sedation    Medications Planned:   fentanyl intravenously    Patient is an appropriate candidate for plan of sedation: yes    Electronically signed by MARYBETH Walker MD on 7/18/2024 at 9:02 AM

## 2024-07-18 NOTE — BRIEF OP NOTE
Brief Postoperative Note      Patient: Luis Seo  YOB: 1950  MRN: [id number]    Date of Procedure: 7/18/2024    Pancreatic neoplasm    Post-Op Diagnosis: Same       CT guided biopsy  DAVID Walker    Assistant:  * No surgical staff found *    Anesthesia: Moderate sedation    Estimated Blood Loss (mL): Minimal    Complications: None    Specimens:   Segment 6 liver    Implants:  * No implants in log *      Drains: * No LDAs found *    Findings:  Infection Present At Time Of Surgery (PATOS) (choose all levels that have infection present):  No infection present  Other Findings: liver lesions    Electronically signed by MARYBETH Walker MD on 7/18/2024 at 9:02 AM

## 2024-07-18 NOTE — DISCHARGE SUMMARY
Hospitalist Discharge Summary    Patient ID: Luis Seo   Patient : 1950  Patient's PCP: Reginald Oneill MD    Admit Date: 7/15/2024   Admitting Physician: Matt Mistry MD    Discharge Date:  2024   Discharge Physician: Matt Mistry MD   Discharge Condition: Stable  Discharge Disposition: Home      Hospital course in brief:  (Please refer to daily progress notes for a comprehensive review of the hospitalization by requesting medical records)  Mr. Luis Seo, a 74 y.o. year old male  who  has a past medical history of Cancer (HCC), Guillain Barré syndrome (HCC), and Pulmonary embolism (HCC).      This patient presented to [facility] emergency room on 7/15/2024 for unrelenting abdominal pain radiating to back.  He was recently diagnosed with metastatic pancreatic cancer and has been following up with oncology service at [facility] back in 2024.  There was a concern for aortic dissection.  CTA chest with contrast no evidence of thoracoabdominal aortic aneurysm or dissection.  No evidence of pulmonary embolism.  CT abdomen pelvis showed 4.4 cm pancreatic head lesion with multiple hepatic lesions which are consistent with metastatic pancreatic carcinoma.  Lab work sodium 138, potassium 3.4, bicarb 21, BUN/creatinine 11/0.7, lactic acid 3.2, blood glucose 179, troponin elevated to 26, proBNP 283  LFT , , , total bilirubin 1.5  Lipase 1999  Patient states that he is pain is well-controlled with IV Dilaudid.  Discussed with him and his daughter at bedside about chemo and radiation however patient does not want therapy started.  General surgery has been consulted from ED.  Patient  admitted for further evaluation and workup.      MRI abdomen reviewed no sign of cholecystitis, choledocholithiasis.  Gallbladder mildly distended.  Postsurgical changes of distal pancreatectomy and splenectomy with findings of minimal

## 2024-07-24 ENCOUNTER — OFFICE VISIT (OUTPATIENT)
Dept: PALLATIVE CARE | Age: 74
End: 2024-07-24
Payer: MEDICARE

## 2024-07-24 VITALS
DIASTOLIC BLOOD PRESSURE: 81 MMHG | TEMPERATURE: 97.2 F | OXYGEN SATURATION: 99 % | BODY MASS INDEX: 25.48 KG/M2 | HEART RATE: 68 BPM | SYSTOLIC BLOOD PRESSURE: 151 MMHG | WEIGHT: 177.6 LBS

## 2024-07-24 DIAGNOSIS — Z51.5 PALLIATIVE CARE ENCOUNTER: ICD-10-CM

## 2024-07-24 DIAGNOSIS — Z79.891 USE OF OPIATES FOR THERAPEUTIC PURPOSES: ICD-10-CM

## 2024-07-24 DIAGNOSIS — Z79.891 USE OF OPIATES FOR THERAPEUTIC PURPOSES: Primary | ICD-10-CM

## 2024-07-24 LAB — SURGICAL PATHOLOGY REPORT: NORMAL

## 2024-07-24 PROCEDURE — 99204 OFFICE O/P NEW MOD 45 MIN: CPT

## 2024-07-24 RX ORDER — POLYETHYLENE GLYCOL 3350 17 G/17G
17 POWDER, FOR SOLUTION ORAL DAILY
Qty: 510 G | Refills: 5 | Status: SHIPPED | OUTPATIENT
Start: 2024-07-24 | End: 2025-01-20

## 2024-07-24 RX ORDER — HYDROCODONE BITARTRATE AND ACETAMINOPHEN 10; 325 MG/1; MG/1
1 TABLET ORAL EVERY 4 HOURS PRN
Qty: 120 TABLET | Refills: 0 | Status: SHIPPED | OUTPATIENT
Start: 2024-07-24 | End: 2024-08-23

## 2024-07-24 NOTE — PROGRESS NOTES
dry  Neuro:  PERRL, Alert, oriented x 3; following commands    [place] Symptom Assessment Score       7/24/2024     8:00 AM   [place] Score   Pain Score 3   Tiredness Score Not tired   Nausea Score Not nauseated   Depression Score Not depressed   Anxiety Score Not anxious   Drowsiness Score Not drowsy   Appetite Score 2   Wellbeing Score 2   Dyspnea Score 1   Other Problem Score 1   Total Assessment Score(calculated) 9     Assessed by: patient and provider.    Opioid Risk Tool Scores    If Patient is Female If Patient is Male   Family History of Substance Abuse:     Alcohol [] 1 [x] 3   Illegal Drugs [] 2 [] 3   Prescription Drugs [] 4 [] 3    Personal History of Substance Abuse:     Alcohol [] 3 [x] 3   Illegal Drugs [] 4 [] 4   Prescription Drugs [] 5 [] 5    Age between 16 and 45 [] 1 [] 1   History of Preadolescent Sexual Abuse [] 3 [] 0   Psychological Disease     ADD, OCD, Bipolar, or Schizophrenia [] 2 [] 2   Depression [] 1 [] 1   Total 6   Low Risk = 0-3          Moderate Risk = 4-7          High Risk = 8 and above     Current Medications:  Medications reviewed: yes    Controlled Substances Monitoring: [place] reviewed 7/24/24.  RX Monitoring Periodic Controlled Substance Monitoring   7/24/2024   8:52 AM Possible medication side effects, risk of tolerance/dependence & alternative treatments discussed.;No signs of potential drug abuse or diversion identified.;Assessed functional status (ability to engage in work or other purposeful activities, the pain intensity and its interference with activities of daily living, quality of family life and social activities, and the physical activity);Obtaining appropriate analgesic effect of treatment.;Random urine drug screen sent today.       DARIUS Jeong - CNP  Palliative Medicine    MDM/Time:  The total encounter time on this service date was 45 minutes, which was spent performing a face-to-face encounter and personally completing the provider-level activities

## 2024-07-25 LAB
6-MAM, QUANTITATIVE, URINE: <10 NG/ML
6-MONOACETYLMORPHINE, URINE: NEGATIVE
7-AMINOCLONAZEPAM, QUANTITATIVE, URINE: <50 NG/ML
ABNORMAL SPECIMEN VALIDITY TEST: ABNORMAL
ALCOHOL URINE: NOT DETECTED MG/DL
ALPHA-HYDROXYALPRAZOLAM, QUANTITATIVE, URINE: <50 NG/ML
ALPHA-HYDROXYMIDAZOLAM, QUANTITATIVE, URINE: <50 NG/ML
ALPHA-HYDROXYTRIAZOLAM, QUANTITATIVE, URINE: <50 NG/ML
ALPRAZOLAM URINE QUANT: <50 NG/ML
AMPHETAMINE SCREEN URINE: NEGATIVE
BARBITURATE SCREEN URINE: NEGATIVE
BENZODIAZEPINE SCREEN, URINE: NEGATIVE
BUPRENORPHINE URINE: NEGATIVE
CANNABINOID SCREEN URINE: NEGATIVE
CHLORDIAZEPOXIDE, QUANTITATIVE, URINE: <50 NG/ML
CLONAZEPAM, QUANTITATIVE, URINE: <50 NG/ML
COCAINE METABOLITE, URINE: NEGATIVE
CODEINE, QUANTITATIVE, URINE: <50 NG/ML
COMPLIANCE DRUG ANALYSIS, URINE: NORMAL
DIAZEPAM URINE QUANT: <50 NG/ML
FENTANYL URINE: NEGATIVE
FLUNITRAZEPAM, QUANTITATIVE, URINE: <50 NG/ML
FLURAZEPAM, QUANTITATIVE, URINE: <50 NG/ML
HYDROCODONE, QUANTITATIVE, URINE: 327.3 NG/ML
HYDROMORPHONE, QUANTITATIVE, URINE: 145.8 NG/ML
INTEGRITY CHECK, CREATININE, URINE: 166.9 MG/DL (ref 22–250)
INTEGRITY CHECK, OXIDANT, URINE: <40 MG/L
INTEGRITY CHECK, PH, URINE: 5.7 (ref 4.5–9)
INTEGRITY CHECK, SPECIFIC GRAVITY, URINE: 1.03 (ref 1–1.03)
LORAZEPAM URINE QUANT: <50 NG/ML
METHADONE SCREEN, URINE: NEGATIVE
MIDAZOLAM URINE QUANT: <50 NG/ML
MORPHINE, QUANTITATIVE, URINE: <50 NG/ML
NORDIAZEPAM URINE QUANT: <50 NG/ML
NORHYDROCODONE, QUANTITATIVE, URINE: >1000 NG/ML
NOROXYCODONE, QUANTITATIVE, URINE: <50 NG/ML
OPIATES, URINE: POSITIVE
OXAZEPAM URINE QUANT: <50 NG/ML
OXYCODONE SCREEN URINE: NEGATIVE
OXYCODONE URINE, QUANTITATIVE: <50 NG/ML
OXYMORPHONE, QUANTITATIVE, URINE: <50 NG/ML
PCP,URINE: NEGATIVE
TEMAZEPAM, QUANTITATIVE, URINE: <50 NG/ML
TEST INFORMATION: ABNORMAL
TRAMADOL, URINE: NEGATIVE

## 2024-07-29 ENCOUNTER — OFFICE VISIT (OUTPATIENT)
Dept: ONCOLOGY | Age: 74
End: 2024-07-29
Payer: MEDICARE

## 2024-07-29 ENCOUNTER — HOSPITAL ENCOUNTER (OUTPATIENT)
Dept: INFUSION THERAPY | Age: 74
Discharge: HOME | End: 2024-07-29
Payer: MEDICARE

## 2024-07-29 ENCOUNTER — TELEPHONE (OUTPATIENT)
Dept: INFUSION THERAPY | Age: 74
End: 2024-07-29

## 2024-07-29 ENCOUNTER — TELEPHONE (OUTPATIENT)
Dept: CASE MANAGEMENT | Age: 74
End: 2024-07-29

## 2024-07-29 VITALS
OXYGEN SATURATION: 100 % | BODY MASS INDEX: 25.01 KG/M2 | SYSTOLIC BLOOD PRESSURE: 135 MMHG | HEART RATE: 72 BPM | TEMPERATURE: 98 F | DIASTOLIC BLOOD PRESSURE: 81 MMHG | WEIGHT: 174.3 LBS | RESPIRATION RATE: 18 BRPM

## 2024-07-29 DIAGNOSIS — C25.9 PANCREATIC CARCINOMA METASTATIC TO LIVER (HCC): Primary | ICD-10-CM

## 2024-07-29 DIAGNOSIS — C25.1 MALIGNANT NEOPLASM OF BODY OF PANCREAS (HCC): ICD-10-CM

## 2024-07-29 DIAGNOSIS — C78.7 PANCREATIC CARCINOMA METASTATIC TO LIVER (HCC): Primary | ICD-10-CM

## 2024-07-29 LAB
ALBUMIN SERPL-MCNC: 4.1 G/DL (ref 3.5–5.2)
ALP SERPL-CCNC: 129 U/L (ref 40–129)
ALT SERPL-CCNC: 28 U/L (ref 0–40)
ANION GAP SERPL CALCULATED.3IONS-SCNC: 11 MMOL/L (ref 7–16)
AST SERPL-CCNC: 26 U/L (ref 0–39)
BASOPHILS # BLD: 0.1 K/UL (ref 0–0.2)
BASOPHILS NFR BLD: 2 % (ref 0–2)
BILIRUB SERPL-MCNC: 0.6 MG/DL (ref 0–1.2)
BUN SERPL-MCNC: 17 MG/DL (ref 6–23)
CALCIUM SERPL-MCNC: 10.9 MG/DL (ref 8.6–10.2)
CEA SERPL-MCNC: 8.3 NG/ML (ref 0–5.2)
CHLORIDE SERPL-SCNC: 103 MMOL/L (ref 98–107)
CO2 SERPL-SCNC: 25 MMOL/L (ref 22–29)
CREAT SERPL-MCNC: 0.7 MG/DL (ref 0.7–1.2)
EOSINOPHIL # BLD: 0.21 K/UL (ref 0.05–0.5)
EOSINOPHILS RELATIVE PERCENT: 4 % (ref 0–6)
ERYTHROCYTE [DISTWIDTH] IN BLOOD BY AUTOMATED COUNT: 16.6 % (ref 11.5–15)
GFR, ESTIMATED: >90 ML/MIN/1.73M2
GLUCOSE SERPL-MCNC: 129 MG/DL (ref 74–99)
HCT VFR BLD AUTO: 42.3 % (ref 37–54)
HGB BLD-MCNC: 13.2 G/DL (ref 12.5–16.5)
IMM GRANULOCYTES # BLD AUTO: <0.03 K/UL (ref 0–0.58)
IMM GRANULOCYTES NFR BLD: 0 % (ref 0–5)
LYMPHOCYTES NFR BLD: 1.77 K/UL (ref 1.5–4)
LYMPHOCYTES RELATIVE PERCENT: 34 % (ref 20–42)
MCH RBC QN AUTO: 25.4 PG (ref 26–35)
MCHC RBC AUTO-ENTMCNC: 31.2 G/DL (ref 32–34.5)
MCV RBC AUTO: 81.5 FL (ref 80–99.9)
MONOCYTES NFR BLD: 0.68 K/UL (ref 0.1–0.95)
MONOCYTES NFR BLD: 13 % (ref 2–12)
NEUTROPHILS NFR BLD: 47 % (ref 43–80)
NEUTS SEG NFR BLD: 2.42 K/UL (ref 1.8–7.3)
PLATELET # BLD AUTO: 279 K/UL (ref 130–450)
PMV BLD AUTO: 11.5 FL (ref 7–12)
POTASSIUM SERPL-SCNC: 4.4 MMOL/L (ref 3.5–5)
PROT SERPL-MCNC: 7.4 G/DL (ref 6.4–8.3)
RBC # BLD AUTO: 5.19 M/UL (ref 3.8–5.8)
SODIUM SERPL-SCNC: 139 MMOL/L (ref 132–146)
WBC OTHER # BLD: 5.2 K/UL (ref 4.5–11.5)

## 2024-07-29 PROCEDURE — 80053 COMPREHEN METABOLIC PANEL: CPT

## 2024-07-29 PROCEDURE — 36415 COLL VENOUS BLD VENIPUNCTURE: CPT

## 2024-07-29 PROCEDURE — 82378 CARCINOEMBRYONIC ANTIGEN: CPT

## 2024-07-29 PROCEDURE — 85025 COMPLETE CBC W/AUTO DIFF WBC: CPT

## 2024-07-29 PROCEDURE — 86301 IMMUNOASSAY TUMOR CA 19-9: CPT

## 2024-07-29 PROCEDURE — 99212 OFFICE O/P EST SF 10 MIN: CPT

## 2024-07-29 NOTE — TELEPHONE ENCOUNTER
Per Dr. Trammell, patient's ECOG is zero. Voiced understanding and will apply for prior authorization for treatment.

## 2024-07-29 NOTE — PROGRESS NOTES
[facility]  Hematology/Oncology  Consult      Patient Name: Luis Seo  YOB: 1950  PCP: Reginald Oneill MD   Referring Provider:      Reason for Consultation:   Chief Complaint   Patient presents with    Follow-up        History of Present Illness: This is a 74-year-old male patient following with Dr. Trammell for history of adenocarcinoma of the pancreatic body status post distal pancreatectomy with splenectomy and portal lymphadenectomy at [facility] with positive margins but lymph nodes negative. Not a candidate for adjuvant therapy previously due to poor performance status. Patient with T3 N0 adenocarcinoma of the pancreas with positive margins.  He declines any adjuvant radiation plus capecitabine or adjuvant 6-month of gemcitabine and wants to be followed only with surveillance. 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 Recently admitted for acute PE with significant clot burden on Xarelto.  Patient presented to the ED for evaluation of severe abdominal pain.  CTA chest/abdomen/pelvis showed no evidence of thoracic or abdominal aortic aneurysm or dissection.  4.4 cm pancreatic head lesion with hepatic lesions consistent with metastatic pancreatic carcinoma.  General surgery following.  MRCP without evidence of cholelithiasis or choledocholithiasis, no ductal dilation or filling defect to suggest gallstone pancreatitis  Imaging concerning for possible hepatic mets. LFTs with , , total bili 1.5, and lipase 1999. CMP with K3.4, lactic acid 3.2.  proBNP 283, troponin 26.  CBC with Hgb 11.1 and WBC platelets WNL.  Per H&P treatment including chemotherapy and radiation discussed with patient however patient stated does not want therapy. Consultation on recommendations for concerning hepatic metastasis.    Diagnostic Data:     Past Medical History:   Diagnosis Date    Cancer 
normal...

## 2024-07-31 DIAGNOSIS — C25.9 PANCREATIC CARCINOMA METASTATIC TO LIVER (HCC): ICD-10-CM

## 2024-07-31 DIAGNOSIS — C78.7 PANCREATIC CARCINOMA METASTATIC TO LIVER (HCC): ICD-10-CM

## 2024-07-31 LAB
ALBUMIN SERPL-MCNC: 3.9 G/DL (ref 3.5–5.2)
ALP SERPL-CCNC: 112 U/L (ref 40–129)
ALT SERPL-CCNC: 21 U/L (ref 0–40)
AST SERPL-CCNC: 25 U/L (ref 0–39)
BILIRUB DIRECT SERPL-MCNC: <0.2 MG/DL (ref 0–0.3)
BILIRUB INDIRECT SERPL-MCNC: NORMAL MG/DL (ref 0–1)
BILIRUB SERPL-MCNC: 0.4 MG/DL (ref 0–1.2)
CANCER AG19-9 SERPL IA-ACNC: 3039 U/ML (ref 0–35)
PROT SERPL-MCNC: 7.1 G/DL (ref 6.4–8.3)

## 2024-08-05 ENCOUNTER — HOSPITAL ENCOUNTER (OUTPATIENT)
Dept: INFUSION THERAPY | Age: 74
Discharge: HOME | End: 2024-08-05
Payer: MEDICARE

## 2024-08-05 ENCOUNTER — OFFICE VISIT (OUTPATIENT)
Dept: ONCOLOGY | Age: 74
End: 2024-08-05

## 2024-08-05 ENCOUNTER — CLINICAL DOCUMENTATION (OUTPATIENT)
Dept: INFUSION THERAPY | Age: 74
End: 2024-08-05

## 2024-08-05 VITALS
OXYGEN SATURATION: 96 % | DIASTOLIC BLOOD PRESSURE: 81 MMHG | TEMPERATURE: 97.4 F | SYSTOLIC BLOOD PRESSURE: 171 MMHG | RESPIRATION RATE: 20 BRPM | BODY MASS INDEX: 25.67 KG/M2 | WEIGHT: 178.9 LBS | HEART RATE: 65 BPM

## 2024-08-05 VITALS
DIASTOLIC BLOOD PRESSURE: 76 MMHG | SYSTOLIC BLOOD PRESSURE: 151 MMHG | HEART RATE: 59 BPM | TEMPERATURE: 98 F | RESPIRATION RATE: 20 BRPM | OXYGEN SATURATION: 96 %

## 2024-08-05 DIAGNOSIS — C25.9 PANCREATIC CARCINOMA METASTATIC TO LIVER (HCC): Primary | ICD-10-CM

## 2024-08-05 DIAGNOSIS — C78.7 PANCREATIC CARCINOMA METASTATIC TO LIVER (HCC): Primary | ICD-10-CM

## 2024-08-05 PROCEDURE — 96417 CHEMO IV INFUS EACH ADDL SEQ: CPT

## 2024-08-05 PROCEDURE — 6360000002 HC RX W HCPCS: Performed by: INTERNAL MEDICINE

## 2024-08-05 PROCEDURE — 96361 HYDRATE IV INFUSION ADD-ON: CPT

## 2024-08-05 PROCEDURE — 96413 CHEMO IV INFUSION 1 HR: CPT

## 2024-08-05 PROCEDURE — 96367 TX/PROPH/DG ADDL SEQ IV INF: CPT

## 2024-08-05 PROCEDURE — 2580000003 HC RX 258: Performed by: INTERNAL MEDICINE

## 2024-08-05 PROCEDURE — 96375 TX/PRO/DX INJ NEW DRUG ADDON: CPT

## 2024-08-05 PROCEDURE — 99214 OFFICE O/P EST MOD 30 MIN: CPT

## 2024-08-05 PROCEDURE — 99213 OFFICE O/P EST LOW 20 MIN: CPT

## 2024-08-05 RX ORDER — SODIUM CHLORIDE 9 MG/ML
5-250 INJECTION, SOLUTION INTRAVENOUS PRN
Status: DISCONTINUED | OUTPATIENT
Start: 2024-08-05 | End: 2024-08-06 | Stop reason: HOSPADM

## 2024-08-05 RX ORDER — ALBUTEROL SULFATE 90 UG/1
4 AEROSOL, METERED RESPIRATORY (INHALATION) PRN
OUTPATIENT
Start: 2024-08-14

## 2024-08-05 RX ORDER — SODIUM CHLORIDE 9 MG/ML
5-250 INJECTION, SOLUTION INTRAVENOUS PRN
Status: CANCELLED | OUTPATIENT
Start: 2024-08-05

## 2024-08-05 RX ORDER — DEXAMETHASONE SODIUM PHOSPHATE 10 MG/ML
10 INJECTION INTRAMUSCULAR; INTRAVENOUS ONCE
Status: COMPLETED | OUTPATIENT
Start: 2024-08-05 | End: 2024-08-05

## 2024-08-05 RX ORDER — PALONOSETRON HYDROCHLORIDE 0.05 MG/ML
0.25 INJECTION, SOLUTION INTRAVENOUS ONCE
Status: CANCELLED | OUTPATIENT
Start: 2024-08-05 | End: 2024-08-05

## 2024-08-05 RX ORDER — SODIUM CHLORIDE 9 MG/ML
5-250 INJECTION, SOLUTION INTRAVENOUS PRN
OUTPATIENT
Start: 2024-08-14

## 2024-08-05 RX ORDER — ONDANSETRON 2 MG/ML
8 INJECTION INTRAMUSCULAR; INTRAVENOUS
Status: CANCELLED | OUTPATIENT
Start: 2024-08-05

## 2024-08-05 RX ORDER — SODIUM CHLORIDE 0.9 % (FLUSH) 0.9 %
5-40 SYRINGE (ML) INJECTION PRN
OUTPATIENT
Start: 2024-08-14

## 2024-08-05 RX ORDER — PROCHLORPERAZINE EDISYLATE 5 MG/ML
5 INJECTION INTRAMUSCULAR; INTRAVENOUS
OUTPATIENT
Start: 2024-08-14

## 2024-08-05 RX ORDER — CARVEDILOL 3.12 MG/1
3.12 TABLET ORAL 2 TIMES DAILY
COMMUNITY
Start: 2024-07-24

## 2024-08-05 RX ORDER — SODIUM CHLORIDE 0.9 % (FLUSH) 0.9 %
5-40 SYRINGE (ML) INJECTION PRN
Status: CANCELLED | OUTPATIENT
Start: 2024-08-05

## 2024-08-05 RX ORDER — ATORVASTATIN CALCIUM 80 MG/1
80 TABLET, FILM COATED ORAL DAILY
COMMUNITY
Start: 2024-08-01

## 2024-08-05 RX ORDER — DEXAMETHASONE SODIUM PHOSPHATE 10 MG/ML
10 INJECTION, SOLUTION INTRAMUSCULAR; INTRAVENOUS ONCE
Status: CANCELLED | OUTPATIENT
Start: 2024-08-05 | End: 2024-08-05

## 2024-08-05 RX ORDER — MEPERIDINE HYDROCHLORIDE 25 MG/ML
12.5 INJECTION INTRAMUSCULAR; INTRAVENOUS; SUBCUTANEOUS PRN
Status: CANCELLED | OUTPATIENT
Start: 2024-08-05

## 2024-08-05 RX ORDER — PALONOSETRON HYDROCHLORIDE 0.05 MG/ML
0.25 INJECTION, SOLUTION INTRAVENOUS ONCE
Status: COMPLETED | OUTPATIENT
Start: 2024-08-05 | End: 2024-08-05

## 2024-08-05 RX ORDER — PACLITAXEL 100 MG/20ML
200 INJECTION, POWDER, LYOPHILIZED, FOR SUSPENSION INTRAVENOUS ONCE
OUTPATIENT
Start: 2024-08-14 | End: 2024-08-12

## 2024-08-05 RX ORDER — HEPARIN 100 UNIT/ML
500 SYRINGE INTRAVENOUS PRN
OUTPATIENT
Start: 2024-08-14

## 2024-08-05 RX ORDER — HEPARIN 100 UNIT/ML
500 SYRINGE INTRAVENOUS PRN
Status: CANCELLED | OUTPATIENT
Start: 2024-08-05

## 2024-08-05 RX ORDER — PACLITAXEL 100 MG/20ML
200 INJECTION, POWDER, LYOPHILIZED, FOR SUSPENSION INTRAVENOUS ONCE
Status: CANCELLED | OUTPATIENT
Start: 2024-08-05 | End: 2024-08-05

## 2024-08-05 RX ORDER — ALBUTEROL SULFATE 90 UG/1
4 AEROSOL, METERED RESPIRATORY (INHALATION) PRN
Status: CANCELLED | OUTPATIENT
Start: 2024-08-05

## 2024-08-05 RX ORDER — DIPHENHYDRAMINE HYDROCHLORIDE 50 MG/ML
50 INJECTION INTRAMUSCULAR; INTRAVENOUS
OUTPATIENT
Start: 2024-08-14

## 2024-08-05 RX ORDER — PACLITAXEL 100 MG/20ML
200 INJECTION, POWDER, LYOPHILIZED, FOR SUSPENSION INTRAVENOUS ONCE
Status: COMPLETED | OUTPATIENT
Start: 2024-08-05 | End: 2024-08-05

## 2024-08-05 RX ORDER — EPINEPHRINE 1 MG/ML
0.3 INJECTION, SOLUTION, CONCENTRATE INTRAVENOUS PRN
OUTPATIENT
Start: 2024-08-14

## 2024-08-05 RX ORDER — ONDANSETRON 2 MG/ML
8 INJECTION INTRAMUSCULAR; INTRAVENOUS
OUTPATIENT
Start: 2024-08-14

## 2024-08-05 RX ORDER — ACETAMINOPHEN 325 MG/1
650 TABLET ORAL
OUTPATIENT
Start: 2024-08-14

## 2024-08-05 RX ORDER — DIPHENHYDRAMINE HYDROCHLORIDE 50 MG/ML
50 INJECTION INTRAMUSCULAR; INTRAVENOUS
Status: CANCELLED | OUTPATIENT
Start: 2024-08-05

## 2024-08-05 RX ORDER — PALONOSETRON HYDROCHLORIDE 0.05 MG/ML
0.25 INJECTION, SOLUTION INTRAVENOUS ONCE
OUTPATIENT
Start: 2024-08-14 | End: 2024-08-12

## 2024-08-05 RX ORDER — EPINEPHRINE 1 MG/ML
0.3 INJECTION, SOLUTION, CONCENTRATE INTRAVENOUS PRN
Status: CANCELLED | OUTPATIENT
Start: 2024-08-05

## 2024-08-05 RX ORDER — SODIUM CHLORIDE 0.9 % (FLUSH) 0.9 %
5-40 SYRINGE (ML) INJECTION PRN
Status: DISCONTINUED | OUTPATIENT
Start: 2024-08-05 | End: 2024-08-06 | Stop reason: HOSPADM

## 2024-08-05 RX ORDER — ACETAMINOPHEN 325 MG/1
650 TABLET ORAL
Status: CANCELLED | OUTPATIENT
Start: 2024-08-05

## 2024-08-05 RX ORDER — DEXAMETHASONE SODIUM PHOSPHATE 10 MG/ML
10 INJECTION, SOLUTION INTRAMUSCULAR; INTRAVENOUS ONCE
OUTPATIENT
Start: 2024-08-14 | End: 2024-08-12

## 2024-08-05 RX ORDER — SODIUM CHLORIDE 9 MG/ML
INJECTION, SOLUTION INTRAVENOUS CONTINUOUS
OUTPATIENT
Start: 2024-08-14

## 2024-08-05 RX ORDER — MEPERIDINE HYDROCHLORIDE 25 MG/ML
12.5 INJECTION INTRAMUSCULAR; INTRAVENOUS; SUBCUTANEOUS PRN
OUTPATIENT
Start: 2024-08-14

## 2024-08-05 RX ORDER — PROCHLORPERAZINE EDISYLATE 5 MG/ML
5 INJECTION INTRAMUSCULAR; INTRAVENOUS
Status: CANCELLED | OUTPATIENT
Start: 2024-08-05

## 2024-08-05 RX ORDER — SODIUM CHLORIDE 9 MG/ML
INJECTION, SOLUTION INTRAVENOUS CONTINUOUS
Status: CANCELLED | OUTPATIENT
Start: 2024-08-05

## 2024-08-05 RX ADMIN — FOSAPREPITANT 150 MG: 150 INJECTION, POWDER, LYOPHILIZED, FOR SOLUTION INTRAVENOUS at 14:09

## 2024-08-05 RX ADMIN — SODIUM CHLORIDE 20 ML/HR: 9 INJECTION, SOLUTION INTRAVENOUS at 13:35

## 2024-08-05 RX ADMIN — PACLITAXEL 200 MG: 100 INJECTION, POWDER, LYOPHILIZED, FOR SUSPENSION INTRAVENOUS at 14:51

## 2024-08-05 RX ADMIN — GEMCITABINE HYDROCHLORIDE 1584 MG: 1 INJECTION, SOLUTION INTRAVENOUS at 16:02

## 2024-08-05 RX ADMIN — PALONOSETRON 0.25 MG: 0.25 INJECTION, SOLUTION INTRAVENOUS at 13:41

## 2024-08-05 RX ADMIN — DEXAMETHASONE SODIUM PHOSPHATE 10 MG: 10 INJECTION INTRAMUSCULAR; INTRAVENOUS at 13:43

## 2024-08-05 NOTE — PROGRESS NOTES
Spoke with patient about treatment medications (nab-paclitaxel,gemcitabine). We were not able to accommodate patient's schedule to come in earlier for a teaching. His treatment started today.    We went over the protocol to be used, what to expect as far as side effects, and when to call with problems.  This was intended to reinforce the education done by Dr. Trammell.    Patient was provided medication handout to take home and patient was told to call if there are any questions once they had a chance to absorb the information.    Amarilys Cifuentes RPh 8/5/2024 2:52 PM

## 2024-08-05 NOTE — PROGRESS NOTES
CHEMOTHERAPY TEACHING    Chemotherapy teaching performed today for patient and daughter.   The teaching included:    1.  Rationale for chemotherapy    2.  Actions of chemotherapy    3.  Actions of pre and/or post chemotherapy medications    4.  Administration plan: approximate length of treatment and interval between doses.   A.  Chemotherapeutic Agents; ABRAXANE/GEMZAR     5.  Management of side effects:     A. Nausea and vomiting:  Eat light the day of treatment  Dietary alterations: no greasy or spicy foods.  Stay away from favorites.  B.  Alopecia:  Obtain hairpiece prior to hair loss  Alternatives to wigs  C.  Bone Marrow Depression:  Frequent CBC - Chris count (lab time prior to appointment with doctor)   D.  WBC:  Function and recovery  Precautionary measures when low (temperatures BID - avoid ill people/crowds)  E.  Hemoglobin:  Function and recovery  Signs/symptoms if low  Possibility of transfusion  F.  Platelets:  Function and recovery  Signs/symptoms if low    6.  Mental status changes post chemotherapy:  A.  Patient will need a  after 1st treatment (pre-meds)  B.  Encourage family to stay with patient 24 hours post treatment.    7.  Sexuality and Reproduction:   A.  Teratogenic effects of chemotherapy (prevent pregnancy during treatment                     and at least 2 months post therapy).   B.  Sperm banking (young males).    8.  Encourage patient to call clinic with questions.    9.  Copy of home going instructions    10.  Written consent obtained  11.  Patient viewed chemotherapy teaching DVD \"Understanding Chemotherapy\" by the          [employer].     After answering the patient's questions, the patient received \"Chemotherapy and You\" booklet.  A thermometer was given to the patient.        Silvina Murry RN  8/5/2024

## 2024-08-05 NOTE — PROGRESS NOTES
Luis Seo  8/5/2024  Ht Readings from Last 1 Encounters:   07/15/24 1.778 m (5' 10\")     Wt Readings from Last 10 Encounters:   08/05/24 81.1 kg (178 lb 14.4 oz)   07/29/24 79.1 kg (174 lb 4.8 oz)   07/24/24 80.6 kg (177 lb 9.6 oz)   07/15/24 81.6 kg (180 lb)   06/17/24 80.9 kg (178 lb 4.8 oz)   06/07/24 81.6 kg (180 lb)   05/16/24 79.4 kg (175 lb)   05/04/24 85.6 kg (188 lb 11.2 oz)   04/29/24 82.6 kg (182 lb 1.6 oz)   07/27/17 104.3 kg (230 lb)     BMI=25.67    Assessment: Met with Luis and his daughter while in infusion room. Day 1 cycle 1 Abraxane/Gemcitabine for pancreatic cancer. S/p pancreatectomy 3/8/24. Utilizng Zenpep  with each meal for pancreatic enzyme replacement therapy. 2.64% significant weight gain x 1 week. Recent trip and ate well while on vacation. Discussed my role during treatment. Provided resource \"Nutrition Therapy for Cancer Related Side Effects\" and emphasized food safety and water/baking soda/salt mouth rinse as a preventative for mouth sores. Discussed small/frequent meals may be tolerated better than bigger meals. Nutrition questions answered to apparent satisfaction. Contact information provided and encouraged him to call with questions or concerns.     Weight change: 2.64% significant weight gain x 1 week, 1% weight loss x 1 month, 5.03% weight loss x 3 months, 18.68% significant weight loss x 5 months  Appetite: Good appetite recently[place]   Nutritional Side Effects: dull stomach pain  Calculated Needs: 25-30 kcal/kg CBW = 9544-3691 kcal, 1.3-1.5 gm/kg/CBW = 105-125 gm pro, 1 ml/kcal = 8023-6746 ml fluids  Malnutrition Status: At risk  Nutrition Diagnosis: At risk r/t pancreatic cancer.    Recommendations: May tolerate 6 small/protein containing meals better, than 3 large meals.     Doris Pike, SHEFALI

## 2024-08-05 NOTE — PROGRESS NOTES
[facility]  Hematology/Oncology  Consult      Patient Name: Luis Seo  YOB: 1950  PCP: Reginald Oneill MD   Referring Provider:      Reason for Consultation:   No chief complaint on file.       History of Present Illness: This is a 74-year-old male patient following with Dr. Trammell for history of adenocarcinoma of the pancreatic body status post distal pancreatectomy with splenectomy and portal lymphadenectomy at [facility] with positive margins but lymph nodes negative. Not a candidate for adjuvant therapy previously due to poor performance status. Patient with T3 N0 adenocarcinoma of the pancreas with positive margins.  He declines any adjuvant radiation plus capecitabine or adjuvant 6-month of gemcitabine and wants to be followed only with surveillance. 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 Recently admitted for acute PE with significant clot burden on Xarelto.  Patient presented to the ED for evaluation of severe abdominal pain.  CTA chest/abdomen/pelvis showed no evidence of thoracic or abdominal aortic aneurysm or dissection.  4.4 cm pancreatic head lesion with hepatic lesions consistent with metastatic pancreatic carcinoma.  General surgery following.  MRCP without evidence of cholelithiasis or choledocholithiasis, no ductal dilation or filling defect to suggest gallstone pancreatitis  Imaging concerning for possible hepatic mets. LFTs with , , total bili 1.5, and lipase 1999. CMP with K3.4, lactic acid 3.2.  proBNP 283, troponin 26.  CBC with Hgb 11.1 and WBC platelets WNL.  Per H&P treatment including chemotherapy and radiation discussed with patient however patient stated does not want therapy. Consultation on recommendations for concerning hepatic metastasis.    Diagnostic Data:     Past Medical History:   Diagnosis Date    Cancer (HCC)     Guillain Barré

## 2024-08-05 NOTE — PROGRESS NOTES
Patient tolerated treatment well without complications or complaints. Alert and oriented x3. Patient aware of potential side effects and denies questions regarding treatment. Vital signs stable. Pain assessed, patient denies any new or worsening pain. Patient observed for 30 minutes post infusion with no concern noted.

## 2024-08-07 DIAGNOSIS — C78.7 PANCREATIC CARCINOMA METASTATIC TO LIVER (HCC): ICD-10-CM

## 2024-08-07 DIAGNOSIS — C25.9 PANCREATIC CARCINOMA METASTATIC TO LIVER (HCC): ICD-10-CM

## 2024-08-07 LAB
ALBUMIN SERPL-MCNC: 3.8 G/DL (ref 3.5–5.2)
ALBUMIN: 3.8 G/DL (ref 3.5–5.2)
ALP BLD-CCNC: 82 U/L (ref 40–129)
ALP SERPL-CCNC: 81 U/L (ref 40–129)
ALT SERPL-CCNC: 20 U/L (ref 0–40)
ALT SERPL-CCNC: 20 U/L (ref 0–40)
ANION GAP SERPL CALCULATED.3IONS-SCNC: 10 MMOL/L (ref 7–16)
ANION GAP SERPL CALCULATED.3IONS-SCNC: 10 MMOL/L (ref 7–16)
AST SERPL-CCNC: 19 U/L (ref 0–39)
AST SERPL-CCNC: 19 U/L (ref 0–39)
BASOPHILS ABSOLUTE: 0.03 K/UL (ref 0–0.2)
BASOPHILS RELATIVE PERCENT: 0 % (ref 0–2)
BILIRUB SERPL-MCNC: 1 MG/DL (ref 0–1.2)
BILIRUB SERPL-MCNC: 1 MG/DL (ref 0–1.2)
BUN BLDV-MCNC: 20 MG/DL (ref 6–23)
BUN SERPL-MCNC: 20 MG/DL (ref 6–23)
CALCIUM SERPL-MCNC: 10 MG/DL (ref 8.6–10.2)
CALCIUM SERPL-MCNC: 10 MG/DL (ref 8.6–10.2)
CHLORIDE BLD-SCNC: 102 MMOL/L (ref 98–107)
CHLORIDE SERPL-SCNC: 104 MMOL/L (ref 98–107)
CO2 SERPL-SCNC: 27 MMOL/L (ref 22–29)
CO2: 26 MMOL/L (ref 22–29)
CREAT SERPL-MCNC: 0.6 MG/DL (ref 0.7–1.2)
CREAT SERPL-MCNC: 0.6 MG/DL (ref 0.7–1.2)
EOSINOPHILS ABSOLUTE: 0.02 K/UL (ref 0.05–0.5)
EOSINOPHILS RELATIVE PERCENT: 0 % (ref 0–6)
GFR, ESTIMATED: >90 ML/MIN/1.73M2
GFR, ESTIMATED: >90 ML/MIN/1.73M2
GLUCOSE FASTING: 155 MG/DL (ref 74–99)
GLUCOSE P FAST SERPL-MCNC: 152 MG/DL (ref 74–99)
HBA1C MFR BLD: 6.9 % (ref 4–5.6)
HCT VFR BLD CALC: 37.1 % (ref 37–54)
HEMOGLOBIN: 11.3 G/DL (ref 12.5–16.5)
IMMATURE GRANULOCYTES %: 0 % (ref 0–5)
IMMATURE GRANULOCYTES ABSOLUTE: 0.04 K/UL (ref 0–0.58)
LYMPHOCYTES ABSOLUTE: 1.4 K/UL (ref 1.5–4)
LYMPHOCYTES RELATIVE PERCENT: 15 % (ref 20–42)
MAGNESIUM: 2 MG/DL (ref 1.6–2.6)
MCH RBC QN AUTO: 25.7 PG (ref 26–35)
MCHC RBC AUTO-ENTMCNC: 30.5 G/DL (ref 32–34.5)
MCV RBC AUTO: 84.3 FL (ref 80–99.9)
MONOCYTES ABSOLUTE: 0.46 K/UL (ref 0.1–0.95)
MONOCYTES RELATIVE PERCENT: 5 % (ref 2–12)
NEUTROPHILS ABSOLUTE: 7.22 K/UL (ref 1.8–7.3)
NEUTROPHILS RELATIVE PERCENT: 79 % (ref 43–80)
PDW BLD-RTO: 16.6 % (ref 11.5–15)
PLATELET # BLD: 257 K/UL (ref 130–450)
PMV BLD AUTO: 12.4 FL (ref 7–12)
POTASSIUM SERPL-SCNC: 4.3 MMOL/L (ref 3.5–5)
POTASSIUM SERPL-SCNC: 4.4 MMOL/L (ref 3.5–5)
PROT SERPL-MCNC: 6.4 G/DL (ref 6.4–8.3)
RBC # BLD: 4.4 M/UL (ref 3.8–5.8)
SODIUM BLD-SCNC: 138 MMOL/L (ref 132–146)
SODIUM SERPL-SCNC: 141 MMOL/L (ref 132–146)
TOTAL PROTEIN: 6.5 G/DL (ref 6.4–8.3)
WBC # BLD: 9.2 K/UL (ref 4.5–11.5)

## 2024-08-09 LAB
C PEPTIDE SERPL-MCNC: 4.7 NG/ML (ref 1.1–4.4)
INSULIN COMMENT: NORMAL
INSULIN REFERENCE RANGE:: NORMAL
INSULIN: 28.7 MU/L

## 2024-08-14 ENCOUNTER — OFFICE VISIT (OUTPATIENT)
Dept: ONCOLOGY | Age: 74
End: 2024-08-14

## 2024-08-14 ENCOUNTER — HOSPITAL ENCOUNTER (OUTPATIENT)
Dept: INFUSION THERAPY | Age: 74
Discharge: HOME | End: 2024-08-14
Payer: MEDICARE

## 2024-08-14 ENCOUNTER — CLINICAL DOCUMENTATION (OUTPATIENT)
Dept: INFUSION THERAPY | Age: 74
End: 2024-08-14

## 2024-08-14 VITALS
RESPIRATION RATE: 18 BRPM | TEMPERATURE: 97.2 F | OXYGEN SATURATION: 96 % | DIASTOLIC BLOOD PRESSURE: 63 MMHG | HEART RATE: 58 BPM | SYSTOLIC BLOOD PRESSURE: 113 MMHG

## 2024-08-14 VITALS
SYSTOLIC BLOOD PRESSURE: 103 MMHG | WEIGHT: 178.7 LBS | TEMPERATURE: 97.5 F | HEIGHT: 70 IN | OXYGEN SATURATION: 99 % | HEART RATE: 81 BPM | DIASTOLIC BLOOD PRESSURE: 58 MMHG | BODY MASS INDEX: 25.58 KG/M2

## 2024-08-14 DIAGNOSIS — C25.9 PANCREATIC CARCINOMA METASTATIC TO LIVER (HCC): Primary | ICD-10-CM

## 2024-08-14 DIAGNOSIS — C78.7 PANCREATIC CARCINOMA METASTATIC TO LIVER (HCC): Primary | ICD-10-CM

## 2024-08-14 LAB
ALBUMIN SERPL-MCNC: 3.8 G/DL (ref 3.5–5.2)
ALP SERPL-CCNC: 134 U/L (ref 40–129)
ALT SERPL-CCNC: 26 U/L (ref 0–40)
ANION GAP SERPL CALCULATED.3IONS-SCNC: 10 MMOL/L (ref 7–16)
AST SERPL-CCNC: 27 U/L (ref 0–39)
BASOPHILS # BLD: 0.06 K/UL (ref 0–0.2)
BASOPHILS NFR BLD: 1 % (ref 0–2)
BILIRUB SERPL-MCNC: 0.4 MG/DL (ref 0–1.2)
BUN SERPL-MCNC: 16 MG/DL (ref 6–23)
CALCIUM SERPL-MCNC: 10.1 MG/DL (ref 8.6–10.2)
CHLORIDE SERPL-SCNC: 102 MMOL/L (ref 98–107)
CO2 SERPL-SCNC: 28 MMOL/L (ref 22–29)
CREAT SERPL-MCNC: 0.7 MG/DL (ref 0.7–1.2)
EOSINOPHIL # BLD: 0.26 K/UL (ref 0.05–0.5)
EOSINOPHILS RELATIVE PERCENT: 4 % (ref 0–6)
ERYTHROCYTE [DISTWIDTH] IN BLOOD BY AUTOMATED COUNT: 15.8 % (ref 11.5–15)
GFR, ESTIMATED: >90 ML/MIN/1.73M2
GLUCOSE SERPL-MCNC: 197 MG/DL (ref 74–99)
HCT VFR BLD AUTO: 38.6 % (ref 37–54)
HGB BLD-MCNC: 12.4 G/DL (ref 12.5–16.5)
IMM GRANULOCYTES # BLD AUTO: <0.03 K/UL (ref 0–0.58)
IMM GRANULOCYTES NFR BLD: 0 % (ref 0–5)
LYMPHOCYTES NFR BLD: 1.7 K/UL (ref 1.5–4)
LYMPHOCYTES RELATIVE PERCENT: 24 % (ref 20–42)
MAGNESIUM SERPL-MCNC: 1.9 MG/DL (ref 1.6–2.6)
MCH RBC QN AUTO: 26.1 PG (ref 26–35)
MCHC RBC AUTO-ENTMCNC: 32.1 G/DL (ref 32–34.5)
MCV RBC AUTO: 81.1 FL (ref 80–99.9)
MONOCYTES NFR BLD: 0.86 K/UL (ref 0.1–0.95)
MONOCYTES NFR BLD: 12 % (ref 2–12)
NEUTROPHILS NFR BLD: 59 % (ref 43–80)
NEUTS SEG NFR BLD: 4.21 K/UL (ref 1.8–7.3)
PLATELET # BLD AUTO: 159 K/UL (ref 130–450)
PMV BLD AUTO: 12.3 FL (ref 7–12)
POTASSIUM SERPL-SCNC: 3.9 MMOL/L (ref 3.5–5)
PROT SERPL-MCNC: 6.7 G/DL (ref 6.4–8.3)
RBC # BLD AUTO: 4.76 M/UL (ref 3.8–5.8)
SODIUM SERPL-SCNC: 140 MMOL/L (ref 132–146)
WBC OTHER # BLD: 7.1 K/UL (ref 4.5–11.5)

## 2024-08-14 PROCEDURE — 6360000002 HC RX W HCPCS: Performed by: INTERNAL MEDICINE

## 2024-08-14 PROCEDURE — 96367 TX/PROPH/DG ADDL SEQ IV INF: CPT

## 2024-08-14 PROCEDURE — 96417 CHEMO IV INFUS EACH ADDL SEQ: CPT

## 2024-08-14 PROCEDURE — 80053 COMPREHEN METABOLIC PANEL: CPT

## 2024-08-14 PROCEDURE — 96361 HYDRATE IV INFUSION ADD-ON: CPT

## 2024-08-14 PROCEDURE — 96413 CHEMO IV INFUSION 1 HR: CPT

## 2024-08-14 PROCEDURE — 83735 ASSAY OF MAGNESIUM: CPT

## 2024-08-14 PROCEDURE — 36415 COLL VENOUS BLD VENIPUNCTURE: CPT

## 2024-08-14 PROCEDURE — 96374 THER/PROPH/DIAG INJ IV PUSH: CPT

## 2024-08-14 PROCEDURE — 85025 COMPLETE CBC W/AUTO DIFF WBC: CPT

## 2024-08-14 PROCEDURE — 2580000003 HC RX 258: Performed by: INTERNAL MEDICINE

## 2024-08-14 RX ORDER — PALONOSETRON HYDROCHLORIDE 0.05 MG/ML
0.25 INJECTION, SOLUTION INTRAVENOUS ONCE
Status: COMPLETED | OUTPATIENT
Start: 2024-08-14 | End: 2024-08-14

## 2024-08-14 RX ORDER — SODIUM CHLORIDE 9 MG/ML
5-250 INJECTION, SOLUTION INTRAVENOUS PRN
Status: DISCONTINUED | OUTPATIENT
Start: 2024-08-14 | End: 2024-08-15 | Stop reason: HOSPADM

## 2024-08-14 RX ORDER — PACLITAXEL 100 MG/20ML
200 INJECTION, POWDER, LYOPHILIZED, FOR SUSPENSION INTRAVENOUS ONCE
Status: COMPLETED | OUTPATIENT
Start: 2024-08-14 | End: 2024-08-14

## 2024-08-14 RX ORDER — SODIUM CHLORIDE 0.9 % (FLUSH) 0.9 %
5-40 SYRINGE (ML) INJECTION PRN
Status: DISCONTINUED | OUTPATIENT
Start: 2024-08-14 | End: 2024-08-15 | Stop reason: HOSPADM

## 2024-08-14 RX ORDER — DEXAMETHASONE SODIUM PHOSPHATE 10 MG/ML
10 INJECTION INTRAMUSCULAR; INTRAVENOUS ONCE
Status: COMPLETED | OUTPATIENT
Start: 2024-08-14 | End: 2024-08-14

## 2024-08-14 RX ADMIN — PACLITAXEL 200 MG: 100 INJECTION, POWDER, LYOPHILIZED, FOR SUSPENSION INTRAVENOUS at 10:08

## 2024-08-14 RX ADMIN — FOSAPREPITANT 150 MG: 150 INJECTION, POWDER, LYOPHILIZED, FOR SOLUTION INTRAVENOUS at 09:32

## 2024-08-14 RX ADMIN — GEMCITABINE HYDROCHLORIDE 1584 MG: 1 INJECTION, SOLUTION INTRAVENOUS at 11:10

## 2024-08-14 RX ADMIN — DEXAMETHASONE SODIUM PHOSPHATE 10 MG: 10 INJECTION INTRAMUSCULAR; INTRAVENOUS at 09:09

## 2024-08-14 RX ADMIN — SODIUM CHLORIDE 20 ML/HR: 9 INJECTION, SOLUTION INTRAVENOUS at 08:49

## 2024-08-14 RX ADMIN — SODIUM CHLORIDE, PRESERVATIVE FREE 10 ML: 5 INJECTION INTRAVENOUS at 08:49

## 2024-08-14 RX ADMIN — PALONOSETRON 0.25 MG: 0.25 INJECTION, SOLUTION INTRAVENOUS at 09:06

## 2024-08-14 NOTE — PROGRESS NOTES
Luis NUÑEZ Rayray  8/14/2024  Ht Readings from Last 1 Encounters:   08/14/24 1.778 m (5' 10\")     Wt Readings from Last 10 Encounters:   08/14/24 81.1 kg (178 lb 11.2 oz)   08/05/24 81.1 kg (178 lb 14.4 oz)   07/29/24 79.1 kg (174 lb 4.8 oz)   07/24/24 80.6 kg (177 lb 9.6 oz)   07/15/24 81.6 kg (180 lb)   06/17/24 80.9 kg (178 lb 4.8 oz)   06/07/24 81.6 kg (180 lb)   05/16/24 79.4 kg (175 lb)   05/04/24 85.6 kg (188 lb 11.2 oz)   04/29/24 82.6 kg (182 lb 1.6 oz)     BMI=25.64    Assessment: Met with Luis while in for OTV with Dr. Trammell. Day 8 cycle 1 Abraxane/Gemcitabine for pancreatic cancer. Luis reports he felt well after his initial infusion. Denied N/V/D/C, mucositis, dysphagia, odynophagia, or taste alterations. Patient has been eating 2 meals per day, plus snacks. Weight stable past week and 1 month. No nutrition questions voiced at this time. Encouraged him to call with questions or concerns.     Doris Pike RD

## 2024-08-21 ENCOUNTER — OFFICE VISIT (OUTPATIENT)
Dept: PALLATIVE CARE | Age: 74
End: 2024-08-21
Payer: MEDICARE

## 2024-08-21 VITALS — BODY MASS INDEX: 25.25 KG/M2 | WEIGHT: 176 LBS

## 2024-08-21 DIAGNOSIS — Z51.5 PALLIATIVE CARE BY SPECIALIST: Primary | ICD-10-CM

## 2024-08-21 DIAGNOSIS — C25.0 MALIGNANT NEOPLASM OF HEAD OF PANCREAS (HCC): ICD-10-CM

## 2024-08-21 DIAGNOSIS — G89.3 PAIN DUE TO NEOPLASM: ICD-10-CM

## 2024-08-21 PROCEDURE — 99211 OFF/OP EST MAY X REQ PHY/QHP: CPT | Performed by: NURSE PRACTITIONER

## 2024-08-21 RX ORDER — HYDROCODONE BITARTRATE AND ACETAMINOPHEN 10; 325 MG/1; MG/1
1 TABLET ORAL EVERY 4 HOURS PRN
Qty: 120 TABLET | Refills: 0 | Status: SHIPPED | OUTPATIENT
Start: 2024-08-21 | End: 2024-09-20

## 2024-08-21 RX ORDER — SUCRALFATE 1 G/1
1 TABLET ORAL 4 TIMES DAILY
Qty: 120 TABLET | Refills: 1 | Status: SHIPPED | OUTPATIENT
Start: 2024-08-21

## 2024-08-21 NOTE — PROGRESS NOTES
Palliative Care Department  Provider: DARIUS Jeong - CNP    Location of Service:   [facility]    Chief Complaint: Luis Seo is a 74 y.o. male with chief complaint of pain    Assessment/Plan      Metastatic Pancreatic Cancer/Renal Cancer:   -Pancreatic cancer diagnosed December 2023   -He is status post radioablation of renal cell cancer March 8, 2024   -Status post pancreatectomy 3/8/2024   -He is known locally to Dr. Trammell   -He originally chose not to pursue aggressive management   -Status post liver biopsy 7/18/2024   -Started on Abraxane and gemcitabine August 7, 2024    Pain related neoplasm:   -Norco  mg every -46 hours as needed    Bowel regimen/constipation:   -MiraLAX 17 g daily   -Senna S1 tab daily    Dyspepsia/Gastritis:   -Carafate 1 gm QID    Follow Up:  4 weeks.  They were encouraged to call with any questions, concerns, needs, or changes in symptoms.    Subjective:     HPI:  Luis Seo is a 74 y.o. male with renal cell cancer as well as pancreatic cancer, which was diagnosed in December 2023.  He underwent distal pancreatectomy, splenectomy, and portal lymphadenectomy at [facility] on 3/8/2024.  Unfortunately his postoperative course was complicated by development of Guillain-Barré syndrome, which greatly reduced his performance status.  He was discharged under hospice care, but after several weeks he began to recover, and chose to revoke hospice and pursue further management.  He ultimately had a significant improvement in performance status, however he decided not to pursue adjuvant therapy for his pancreatic cancer.  He unfortunately was hospitalized again in July 2024, unfortunately with imaging showing a 4.4 cm pancreatic head lesion, as well as multiple hepatic lesions consistent with metastatic pancreatic cancer.  He underwent liver biopsy on 7/18/2024.  He continues to follow with Dr. Trammell locally.  He was referred to [facility]

## 2024-08-28 ENCOUNTER — HOSPITAL ENCOUNTER (OUTPATIENT)
Dept: INFUSION THERAPY | Age: 74
Discharge: HOME | End: 2024-08-28
Payer: MEDICARE

## 2024-08-28 ENCOUNTER — OFFICE VISIT (OUTPATIENT)
Dept: ONCOLOGY | Age: 74
End: 2024-08-28
Payer: MEDICARE

## 2024-08-28 ENCOUNTER — CLINICAL DOCUMENTATION (OUTPATIENT)
Dept: INFUSION THERAPY | Age: 74
End: 2024-08-28

## 2024-08-28 VITALS
HEIGHT: 70 IN | BODY MASS INDEX: 25.48 KG/M2 | DIASTOLIC BLOOD PRESSURE: 78 MMHG | WEIGHT: 178 LBS | TEMPERATURE: 97.5 F | HEART RATE: 72 BPM | SYSTOLIC BLOOD PRESSURE: 149 MMHG | OXYGEN SATURATION: 97 %

## 2024-08-28 DIAGNOSIS — C25.9 PANCREATIC CARCINOMA METASTATIC TO LIVER (HCC): Primary | ICD-10-CM

## 2024-08-28 DIAGNOSIS — C78.7 PANCREATIC CARCINOMA METASTATIC TO LIVER (HCC): Primary | ICD-10-CM

## 2024-08-28 LAB
ALBUMIN SERPL-MCNC: 3.9 G/DL (ref 3.5–5.2)
ALP SERPL-CCNC: 137 U/L (ref 40–129)
ALT SERPL-CCNC: 33 U/L (ref 0–40)
ANION GAP SERPL CALCULATED.3IONS-SCNC: 9 MMOL/L (ref 7–16)
AST SERPL-CCNC: 23 U/L (ref 0–39)
BASOPHILS # BLD: 0.07 K/UL (ref 0–0.2)
BASOPHILS NFR BLD: 1 % (ref 0–2)
BILIRUB SERPL-MCNC: 0.3 MG/DL (ref 0–1.2)
BUN SERPL-MCNC: 17 MG/DL (ref 6–23)
CALCIUM SERPL-MCNC: 10.3 MG/DL (ref 8.6–10.2)
CHLORIDE SERPL-SCNC: 101 MMOL/L (ref 98–107)
CO2 SERPL-SCNC: 28 MMOL/L (ref 22–29)
CREAT SERPL-MCNC: 0.6 MG/DL (ref 0.7–1.2)
EOSINOPHIL # BLD: 0.21 K/UL (ref 0.05–0.5)
EOSINOPHILS RELATIVE PERCENT: 3 % (ref 0–6)
ERYTHROCYTE [DISTWIDTH] IN BLOOD BY AUTOMATED COUNT: 16 % (ref 11.5–15)
GFR, ESTIMATED: >90 ML/MIN/1.73M2
GLUCOSE SERPL-MCNC: 160 MG/DL (ref 74–99)
HCT VFR BLD AUTO: 38.5 % (ref 37–54)
HGB BLD-MCNC: 12.1 G/DL (ref 12.5–16.5)
IMM GRANULOCYTES # BLD AUTO: <0.03 K/UL (ref 0–0.58)
IMM GRANULOCYTES NFR BLD: 0 % (ref 0–5)
LYMPHOCYTES NFR BLD: 1.52 K/UL (ref 1.5–4)
LYMPHOCYTES RELATIVE PERCENT: 23 % (ref 20–42)
MCH RBC QN AUTO: 25.8 PG (ref 26–35)
MCHC RBC AUTO-ENTMCNC: 31.4 G/DL (ref 32–34.5)
MCV RBC AUTO: 82.1 FL (ref 80–99.9)
MONOCYTES NFR BLD: 1.13 K/UL (ref 0.1–0.95)
MONOCYTES NFR BLD: 17 % (ref 2–12)
NEUTROPHILS NFR BLD: 56 % (ref 43–80)
NEUTS SEG NFR BLD: 3.69 K/UL (ref 1.8–7.3)
PLATELET # BLD AUTO: 565 K/UL (ref 130–450)
PMV BLD AUTO: 10.2 FL (ref 7–12)
POTASSIUM SERPL-SCNC: 4.1 MMOL/L (ref 3.5–5)
PROT SERPL-MCNC: 6.9 G/DL (ref 6.4–8.3)
RBC # BLD AUTO: 4.69 M/UL (ref 3.8–5.8)
SODIUM SERPL-SCNC: 138 MMOL/L (ref 132–146)
WBC OTHER # BLD: 6.6 K/UL (ref 4.5–11.5)

## 2024-08-28 PROCEDURE — 99212 OFFICE O/P EST SF 10 MIN: CPT

## 2024-08-28 PROCEDURE — 85025 COMPLETE CBC W/AUTO DIFF WBC: CPT

## 2024-08-28 PROCEDURE — 80053 COMPREHEN METABOLIC PANEL: CPT

## 2024-08-28 PROCEDURE — 86301 IMMUNOASSAY TUMOR CA 19-9: CPT

## 2024-08-28 PROCEDURE — 36415 COLL VENOUS BLD VENIPUNCTURE: CPT

## 2024-08-28 NOTE — PROGRESS NOTES
Luis NUÑEZ Maximgiorgio  8/28/2024  Ht Readings from Last 1 Encounters:   08/28/24 1.778 m (5' 10\")     Wt Readings from Last 10 Encounters:   08/28/24 80.7 kg (178 lb)   08/21/24 79.8 kg (176 lb)   08/14/24 81.1 kg (178 lb 11.2 oz)   08/05/24 81.1 kg (178 lb 14.4 oz)   07/29/24 79.1 kg (174 lb 4.8 oz)   07/24/24 80.6 kg (177 lb 9.6 oz)   07/15/24 81.6 kg (180 lb)   06/17/24 80.9 kg (178 lb 4.8 oz)   06/07/24 81.6 kg (180 lb)   05/16/24 79.4 kg (175 lb)     BMI=25.54    Assessment: Met with Luis and his daughter while in for OV with Dr. Trammell. Patient has completed 1 cycle Abraxane/gemcitabine for pancreatic cancer with mets to liver. S/p pancreatectomy 3/8/24. Uses Zenpep with meals. Eating 2 main meals per day with snacks. Not drinking any ONS. Denies nausea but does report stomach pain. Having 1-2 stools per day which are light in color. Was started on Carafate for gastritis by palliative medicine. Luis has decided to stop treatment and just wants supportive care. Luis had no nutrition questions at this time. Encouraged him to contact this writer with any nutrition questions or concerns. Weight 178#, no significant weight change x 1 or 3 months. 3.99% weight loss x 4 months, 19.09% significant weight loss x 6 months (subjective usual body weight of 220#)    SHEFALI Mendez

## 2024-08-28 NOTE — PROGRESS NOTES
hepatic lesions are consistent with metastatic pancreatic carcinoma.         ASSESSMENT/PLAN :  74-year-old male patient   - Hx of adenocarcinoma of the pancreatic body status post distal pancreatectomy with splenectomy and portal lymphadenectomy at [facility] with positive margins but lymph nodes negative. Not a candidate for adjuvant therapy previously due to poor performance status. Patient with T3 N0 adenocarcinoma of the pancreas with positive margins.  He declines any adjuvant radiation plus capecitabine or adjuvant 6-month of gemcitabine and wants to be followed only with surveillance. 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 Recently admitted for acute PE with significant clot burden on Xarelto.      - Severe abdominal pain.    - CTA chest/abdomen/pelvis showed no evidence of thoracic or abdominal aortic aneurysm or dissection.  4.4 cm pancreatic head lesion with hepatic lesions consistent with metastatic pancreatic carcinoma.   - General surgery following.  MRCP without evidence of cholelithiasis or choledocholithiasis, no ductal dilation or filling defect to suggest gallstone pancreatitis  Imaging concerning for possible hepatic mets. LFTs with , , total bili 1.5, and lipase 1999.   - CMP with K3.4, lactic acid 3.2.  proBNP 283, troponin 26.  CBC with Hgb 11.1 and WBC platelets WNL.      -Per H&P treatment including chemotherapy and radiation discussed with patient however patient stated does not want therapy.     Will discuss the progression of patient's condition and treatment options needed.     Discussed with patient and daughter at bedside.  He opted for CT-guided liver biopsy to determine if he has metastatic pancreatic cancer which is highly likely versus metastatic renal cell carcinoma which would be less likely.  Based on histology he will decide if he wants treatments  His Xarelto was held  today and will plan his CT-guided liver biopsy by IR on Thursday 7/29/2024  Patient again is confused as to whether he wants treatments or supportive care.  His liver biopsy shows metastatic adenocarcinoma favoring pancreatic primary.  Liver, biopsy: Metastatic adenocarcinoma, favor pancreatic primary,   see comment.     Comment:   The metastatic adenocarcinoma is moderately differentiated, and   immunostains stain the adenocarcinoma in block A3 as follows:   CK7: Positive   CK20: Negative   CDX2: Positive, patchy with moderate intensity   PAX8: Negative     He would not be a good candidate for M FOLFIRINOX but may be considered for gemcitabine based regimen like Abraxane/gemcitabine    After a lengthy discussion with him and his daughter he opted to go on a trial of Abraxane and gemcitabine.  All potential side effects were discussed.    8/7/2024.  Patient was seen today on follow-up.  He is very apprehensive because he had to wait for a room.  He is very grumpy and threatening to leave if his chemo is not instituted immediately  All potential side effects were discussed  He will receive cycle 1 of Abraxane and gemcitabine    8/14/2024  Tolerated cycle 1 of chemotherapy with Abraxane and gemcitabine very well.  No nausea or emesis.  No mucositis.  No significant myalgias.  No signs of peripheral neuropathy.  Appetite is good and weight is stable.  Labs reviewed.  He is to continue on present regimen and will receive day 8 of cycle 1 of Abraxane and gemcitabine.      8/28/2024  Patient continues with good performance status.  He follows with palliative care and his pain is very well-controlled.  He has been initiated on sucralfate for GERD.  He continues on Zenpep.  He was seen by dietitian and given samples of Ensure and Ensure Plus.  His labs are unremarkable.  Patient has opted against any further chemotherapy and he wants to go with supportive care only and will continue to follow with palliative care with

## 2024-08-29 ENCOUNTER — TELEPHONE (OUTPATIENT)
Dept: PALLATIVE CARE | Age: 74
End: 2024-08-29

## 2024-08-29 DIAGNOSIS — G89.3 PAIN DUE TO NEOPLASM: ICD-10-CM

## 2024-08-29 DIAGNOSIS — Z51.5 PALLIATIVE CARE BY SPECIALIST: Primary | ICD-10-CM

## 2024-08-29 DIAGNOSIS — C25.0 MALIGNANT NEOPLASM OF HEAD OF PANCREAS (HCC): ICD-10-CM

## 2024-08-29 RX ORDER — MORPHINE SULFATE 15 MG/1
15 TABLET, FILM COATED, EXTENDED RELEASE ORAL 2 TIMES DAILY
Qty: 60 TABLET | Refills: 0 | Status: SHIPPED | OUTPATIENT
Start: 2024-08-29 | End: 2024-09-28

## 2024-08-29 NOTE — TELEPHONE ENCOUNTER
Call from Luis stating he is still waking up with pain after taking Norco 10/325 mg 4-5 times a day. He is asking if there is an extended release medication he can take for long acting effect. Discussed with Terry Crandall CNP and new order for MS Contin 15 mg sent to [facility] in [place]. Explained to Luis use of long acting medication and decreasing usage of Norco . Luis verbalizes understanding.

## 2024-08-30 LAB — CANCER AG19-9 SERPL IA-ACNC: 2391 U/ML (ref 0–35)

## 2024-09-18 ENCOUNTER — OFFICE VISIT (OUTPATIENT)
Dept: PALLATIVE CARE | Age: 74
End: 2024-09-18
Payer: MEDICARE

## 2024-09-18 VITALS — BODY MASS INDEX: 24.82 KG/M2 | WEIGHT: 173 LBS

## 2024-09-18 DIAGNOSIS — G89.3 PAIN DUE TO NEOPLASM: ICD-10-CM

## 2024-09-18 DIAGNOSIS — C25.0 MALIGNANT NEOPLASM OF HEAD OF PANCREAS (HCC): Primary | ICD-10-CM

## 2024-09-18 DIAGNOSIS — Z51.5 PALLIATIVE CARE BY SPECIALIST: ICD-10-CM

## 2024-09-18 PROCEDURE — 99213 OFFICE O/P EST LOW 20 MIN: CPT | Performed by: NURSE PRACTITIONER

## 2024-09-18 PROCEDURE — 99211 OFF/OP EST MAY X REQ PHY/QHP: CPT | Performed by: NURSE PRACTITIONER

## 2024-09-18 PROCEDURE — 1123F ACP DISCUSS/DSCN MKR DOCD: CPT | Performed by: NURSE PRACTITIONER

## 2024-09-18 RX ORDER — DOCUSATE SODIUM AND SENNOSIDES 8.6; 5 MG/1; MG/1
TABLET, FILM COATED ORAL
COMMUNITY
Start: 2024-08-26

## 2024-09-25 DIAGNOSIS — G89.3 PAIN DUE TO NEOPLASM: ICD-10-CM

## 2024-09-25 DIAGNOSIS — C25.0 MALIGNANT NEOPLASM OF HEAD OF PANCREAS (HCC): ICD-10-CM

## 2024-09-25 DIAGNOSIS — Z51.5 PALLIATIVE CARE BY SPECIALIST: ICD-10-CM

## 2024-09-25 RX ORDER — MORPHINE SULFATE 15 MG/1
15 TABLET, FILM COATED, EXTENDED RELEASE ORAL 2 TIMES DAILY
Qty: 60 TABLET | Refills: 0 | Status: SHIPPED | OUTPATIENT
Start: 2024-09-25 | End: 2024-10-25

## 2024-10-15 RX ORDER — LACTULOSE 10 G/15ML
20 SOLUTION ORAL 2 TIMES DAILY PRN
Qty: 900 ML | Refills: 1 | Status: SHIPPED | OUTPATIENT
Start: 2024-10-15 | End: 2024-11-14

## 2024-10-15 RX ORDER — DOCUSATE SODIUM AND SENNOSIDES 8.6; 5 MG/1; MG/1
TABLET, FILM COATED ORAL
Status: CANCELLED | OUTPATIENT
Start: 2024-10-15

## 2024-10-15 RX ORDER — SENNA AND DOCUSATE SODIUM 50; 8.6 MG/1; MG/1
2 TABLET, FILM COATED ORAL 2 TIMES DAILY
Qty: 120 TABLET | Refills: 2 | Status: SHIPPED | OUTPATIENT
Start: 2024-10-15 | End: 2025-01-13

## 2024-10-15 NOTE — TELEPHONE ENCOUNTER
Patient uLis called and would like something else for his bowels. He would also like the laxative 50-8.6mg refilled. Was taking colace and mirilax which did not work. Then stated all 3 work but not enough. Last BM yesterday. Having BMs every two days. [facility] [place]. Verified pharmacy with patient. Routed call to [facility], [facility]

## 2024-10-17 ENCOUNTER — TELEPHONE (OUTPATIENT)
Dept: PALLATIVE CARE | Age: 74
End: 2024-10-17

## 2024-10-17 NOTE — TELEPHONE ENCOUNTER
Call from Luis stating pain medication is not working. Called back and spoke with Luis, reviewed how he is taking his pain medication. Luis states he takes his MS Contin 15 mg every 12 hours. He does not take Norco 10/325 mg except every few days. Encouraged Luis to take Norco more frequently. Order is for every 4 hours as needed. Encouraged Luis to start at every 6-8 hours and only for severe breakthrough pain. Luis verbalizes understanding. Discussed with Luis if he was ready for Hospice yet, as discussed in previous clinic visits. Luis states it is getting close, he will let us know. Support provided through active listening and reassurance. Luis knows to call with any questions or concerns.

## 2024-10-23 ENCOUNTER — OFFICE VISIT (OUTPATIENT)
Dept: PALLATIVE CARE | Age: 74
End: 2024-10-23
Payer: MEDICARE

## 2024-10-23 VITALS
OXYGEN SATURATION: 99 % | TEMPERATURE: 97.3 F | HEART RATE: 75 BPM | WEIGHT: 162 LBS | SYSTOLIC BLOOD PRESSURE: 116 MMHG | DIASTOLIC BLOOD PRESSURE: 75 MMHG | BODY MASS INDEX: 23.24 KG/M2

## 2024-10-23 DIAGNOSIS — G89.3 PAIN DUE TO NEOPLASM: ICD-10-CM

## 2024-10-23 DIAGNOSIS — C25.0 MALIGNANT NEOPLASM OF HEAD OF PANCREAS (HCC): ICD-10-CM

## 2024-10-23 DIAGNOSIS — Z51.5 PALLIATIVE CARE BY SPECIALIST: ICD-10-CM

## 2024-10-23 PROCEDURE — 99211 OFF/OP EST MAY X REQ PHY/QHP: CPT | Performed by: NURSE PRACTITIONER

## 2024-10-23 PROCEDURE — 1123F ACP DISCUSS/DSCN MKR DOCD: CPT | Performed by: NURSE PRACTITIONER

## 2024-10-23 PROCEDURE — 1159F MED LIST DOCD IN RCRD: CPT | Performed by: NURSE PRACTITIONER

## 2024-10-23 PROCEDURE — 99214 OFFICE O/P EST MOD 30 MIN: CPT | Performed by: NURSE PRACTITIONER

## 2024-10-23 RX ORDER — SUCRALFATE 1 G/1
1 TABLET ORAL 4 TIMES DAILY
Qty: 120 TABLET | Refills: 1
Start: 2024-10-23

## 2024-10-23 RX ORDER — MORPHINE SULFATE 15 MG/1
15 TABLET, FILM COATED, EXTENDED RELEASE ORAL 2 TIMES DAILY
Qty: 60 TABLET | Refills: 0 | Status: SHIPPED | OUTPATIENT
Start: 2024-10-23 | End: 2024-11-22

## 2024-10-23 NOTE — PROGRESS NOTES
continues to follow with Dr. Trammell locally.  He was referred to [facility] symptomatic support.    Subjective/Events/Discussions:  Luis presents today unaccompanied  Unfortunately he has been experiencing more pain, as well as nausea  The pain is in his epigastric and upper abdominal area  He had been utilizing MS Contin 15 mg twice daily, and not needing any Norco for breakthrough pain  He had been utilizing Tylenol previously when pain was a bit worse, which had been working, but it has been less tolerable, and he has again started using the Norco about 3 times per day now  Eating often makes this pain worse  It is noted that he stopped utilizing Carafate about a month ago, and the pain has been worsening recently  I do recommend he restart the Carafate, we will see if his discomfort improves which would indicate worsening gastritis  Unfortunately it is also very possible he is having disease progression, as he has now been off of treatment for some time  He is aware of the potential for disease progression, and is concerned that his disease is progressing  We did discuss that if this is disease progression we can make changes to his analgesic regimen to provide better pain relief, but ultimately if his disease is progressing he may benefit from a possible transition to hospice care  He states that he understands this, is open to discussion regarding hospice if his symptoms continue to progress or not improve  He will keep his follow-up with us in a couple weeks, and also has follow-up with Dr. Trammell the same day  He otherwise is encouraged to call if symptoms or not improving or worsening prior to her next encounter  He has no other significant complaints at this time      More pain and nausea, eating makes the pain and nausea worse    Pain Assessment   Ratin  Description: aching, stabbing, constant, and \"uncomfortable\"  Duration: months  Frequency: daily  Location: abdomen  Alleviating

## 2024-11-06 ENCOUNTER — TELEPHONE (OUTPATIENT)
Dept: PALLATIVE CARE | Age: 74
End: 2024-11-06

## 2024-11-06 DIAGNOSIS — C25.0 MALIGNANT NEOPLASM OF HEAD OF PANCREAS (HCC): Primary | ICD-10-CM

## 2024-11-06 RX ORDER — ONDANSETRON 8 MG/1
8 TABLET, ORALLY DISINTEGRATING ORAL EVERY 8 HOURS PRN
Qty: 90 TABLET | Refills: 0 | Status: SHIPPED | OUTPATIENT
Start: 2024-11-06 | End: 2024-12-06

## 2024-11-18 ENCOUNTER — HOSPITAL ENCOUNTER (OUTPATIENT)
Dept: INFUSION THERAPY | Age: 74
Discharge: HOME | End: 2024-11-18
Payer: MEDICARE

## 2024-11-18 DIAGNOSIS — C78.7 PANCREATIC CARCINOMA METASTATIC TO LIVER (HCC): ICD-10-CM

## 2024-11-18 DIAGNOSIS — C25.9 PANCREATIC CARCINOMA METASTATIC TO LIVER (HCC): ICD-10-CM

## 2024-11-18 LAB
ALBUMIN SERPL-MCNC: 3.9 G/DL (ref 3.5–5.2)
ALP SERPL-CCNC: 407 U/L (ref 40–129)
ALT SERPL-CCNC: 59 U/L (ref 0–40)
ANION GAP SERPL CALCULATED.3IONS-SCNC: 16 MMOL/L (ref 7–16)
AST SERPL-CCNC: 56 U/L (ref 0–39)
BASOPHILS # BLD: 0.19 K/UL (ref 0–0.2)
BASOPHILS NFR BLD: 4 % (ref 0–2)
BILIRUB SERPL-MCNC: 1.2 MG/DL (ref 0–1.2)
BUN SERPL-MCNC: 9 MG/DL (ref 6–23)
CALCIUM SERPL-MCNC: 11.4 MG/DL (ref 8.6–10.2)
CHLORIDE SERPL-SCNC: 93 MMOL/L (ref 98–107)
CO2 SERPL-SCNC: 27 MMOL/L (ref 22–29)
CREAT SERPL-MCNC: 0.6 MG/DL (ref 0.7–1.2)
EOSINOPHIL # BLD: 0.04 K/UL (ref 0.05–0.5)
EOSINOPHILS RELATIVE PERCENT: 1 % (ref 0–6)
ERYTHROCYTE [DISTWIDTH] IN BLOOD BY AUTOMATED COUNT: 18.1 % (ref 11.5–15)
GFR, ESTIMATED: >90 ML/MIN/1.73M2
GLUCOSE SERPL-MCNC: 385 MG/DL (ref 74–99)
HCT VFR BLD AUTO: 44 % (ref 37–54)
HGB BLD-MCNC: 14.4 G/DL (ref 12.5–16.5)
LYMPHOCYTES NFR BLD: 0.79 K/UL (ref 1.5–4)
LYMPHOCYTES RELATIVE PERCENT: 18 % (ref 20–42)
MCH RBC QN AUTO: 28.4 PG (ref 26–35)
MCHC RBC AUTO-ENTMCNC: 32.7 G/DL (ref 32–34.5)
MCV RBC AUTO: 86.8 FL (ref 80–99.9)
MONOCYTES NFR BLD: 0.45 K/UL (ref 0.1–0.95)
MONOCYTES NFR BLD: 10 % (ref 2–12)
NEUTROPHILS NFR BLD: 66 % (ref 43–80)
NEUTS SEG NFR BLD: 2.84 K/UL (ref 1.8–7.3)
PLATELET CONFIRMATION: NORMAL
PLATELET, FLUORESCENCE: 200 K/UL (ref 130–450)
PMV BLD AUTO: 12.6 FL (ref 7–12)
POTASSIUM SERPL-SCNC: 3.7 MMOL/L (ref 3.5–5)
PROT SERPL-MCNC: 7 G/DL (ref 6.4–8.3)
RBC # BLD AUTO: 5.07 M/UL (ref 3.8–5.8)
RBC # BLD: ABNORMAL 10*6/UL
SODIUM SERPL-SCNC: 136 MMOL/L (ref 132–146)
WBC OTHER # BLD: 4.3 K/UL (ref 4.5–11.5)

## 2024-11-18 PROCEDURE — 80053 COMPREHEN METABOLIC PANEL: CPT

## 2024-11-18 PROCEDURE — 85025 COMPLETE CBC W/AUTO DIFF WBC: CPT

## 2024-11-18 PROCEDURE — 36415 COLL VENOUS BLD VENIPUNCTURE: CPT

## 2024-11-18 PROCEDURE — 86301 IMMUNOASSAY TUMOR CA 19-9: CPT

## 2024-11-19 LAB — CANCER AG19-9 SERPL IA-ACNC: ABNORMAL U/ML (ref 0–35)

## 2024-11-20 ENCOUNTER — TELEPHONE (OUTPATIENT)
Dept: PALLATIVE CARE | Age: 74
End: 2024-11-20

## 2024-11-20 ENCOUNTER — OFFICE VISIT (OUTPATIENT)
Dept: PALLATIVE CARE | Age: 74
End: 2024-11-20
Payer: MEDICARE

## 2024-11-20 ENCOUNTER — OFFICE VISIT (OUTPATIENT)
Dept: ONCOLOGY | Age: 74
End: 2024-11-20
Payer: MEDICARE

## 2024-11-20 VITALS
DIASTOLIC BLOOD PRESSURE: 81 MMHG | TEMPERATURE: 97.2 F | OXYGEN SATURATION: 97 % | HEART RATE: 87 BPM | SYSTOLIC BLOOD PRESSURE: 127 MMHG | BODY MASS INDEX: 21.47 KG/M2 | WEIGHT: 149.6 LBS

## 2024-11-20 VITALS — WEIGHT: 147 LBS | BODY MASS INDEX: 21.09 KG/M2

## 2024-11-20 DIAGNOSIS — C25.9 PANCREATIC CARCINOMA METASTATIC TO LIVER (HCC): Primary | ICD-10-CM

## 2024-11-20 DIAGNOSIS — G89.3 PAIN DUE TO NEOPLASM: ICD-10-CM

## 2024-11-20 DIAGNOSIS — Z51.5 PALLIATIVE CARE BY SPECIALIST: ICD-10-CM

## 2024-11-20 DIAGNOSIS — C78.7 PANCREATIC CARCINOMA METASTATIC TO LIVER (HCC): Primary | ICD-10-CM

## 2024-11-20 DIAGNOSIS — C25.0 MALIGNANT NEOPLASM OF HEAD OF PANCREAS (HCC): ICD-10-CM

## 2024-11-20 PROCEDURE — 99212 OFFICE O/P EST SF 10 MIN: CPT

## 2024-11-20 PROCEDURE — 99211 OFF/OP EST MAY X REQ PHY/QHP: CPT | Performed by: NURSE PRACTITIONER

## 2024-11-20 RX ORDER — MORPHINE SULFATE 30 MG/1
30 TABLET, FILM COATED, EXTENDED RELEASE ORAL 2 TIMES DAILY
Qty: 60 TABLET | Refills: 0 | Status: ON HOLD | OUTPATIENT
Start: 2024-11-20 | End: 2024-12-20

## 2024-11-20 RX ORDER — HYDROCODONE BITARTRATE AND ACETAMINOPHEN 10; 325 MG/1; MG/1
1 TABLET ORAL EVERY 4 HOURS PRN
Qty: 120 TABLET | Refills: 0 | Status: ON HOLD | OUTPATIENT
Start: 2024-11-20 | End: 2024-12-20

## 2024-11-20 RX ORDER — LACTULOSE 10 G/15ML
20 SOLUTION ORAL 2 TIMES DAILY PRN
Qty: 900 ML | Refills: 1 | Status: ON HOLD | OUTPATIENT
Start: 2024-11-20 | End: 2024-12-20

## 2024-11-20 NOTE — TELEPHONE ENCOUNTER
Faxed referral for hospice orders placed by Palliative Care TOM Crandall. Referral sent to [facility] per pt and family request.     [facility]:  Phone: [phone]  Fax: [phone]      Valentina CRUZ,LSW  Palliative Medicine

## 2024-11-20 NOTE — PROGRESS NOTES
[facility] Cancer [facility]  Hematology/Oncology  Consult      Patient Name: Luis Seo  YOB: 1950  PCP: Reginald Oneill MD   Referring Provider:      Reason for Consultation:   No chief complaint on file.       History of Present Illness: This is a 74-year-old male patient following with Dr. Trammell for history of adenocarcinoma of the pancreatic body status post distal pancreatectomy with splenectomy and portal lymphadenectomy at [facility] with positive margins but lymph nodes negative. Not a candidate for adjuvant therapy previously due to poor performance status. Patient with T3 N0 adenocarcinoma of the pancreas with positive margins.  He declines any adjuvant radiation plus capecitabine or adjuvant 6-month of gemcitabine and wants to be followed only with surveillance. 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 Recently admitted for acute PE with significant clot burden on Xarelto.  Patient presented to the ED for evaluation of severe abdominal pain.  CTA chest/abdomen/pelvis showed no evidence of thoracic or abdominal aortic aneurysm or dissection.  4.4 cm pancreatic head lesion with hepatic lesions consistent with metastatic pancreatic carcinoma.  General surgery following.  MRCP without evidence of cholelithiasis or choledocholithiasis, no ductal dilation or filling defect to suggest gallstone pancreatitis  Imaging concerning for possible hepatic mets. LFTs with , , total bili 1.5, and lipase 1999. CMP with K3.4, lactic acid 3.2.  proBNP 283, troponin 26.  CBC with Hgb 11.1 and WBC platelets WNL.  Per H&P treatment including chemotherapy and radiation discussed with patient however patient stated does not want therapy. Consultation on recommendations for concerning hepatic metastasis.    Diagnostic Data:     Past Medical History:   Diagnosis Date    Cancer (HCC)     Guillain Barré

## 2024-11-20 NOTE — PROGRESS NOTES
in work or other purposeful activities, the pain intensity and its interference with activities of daily living, quality of family life and social activities, and the physical activity);Obtaining appropriate analgesic effect of treatment.       DARIUS Jeong - CNP  Palliative Medicine    MDM/Time:  The total encounter time on this service date was 35 minutes, which was spent performing a face-to-face encounter and personally completing the provider-level activities documented in the note.  This includes time spent prior to the visit and after the visit in direct care of the patient.  This time does not include time spent in any separately reportable services.        Thank you for allowing Palliative Medicine to participate in the care of Luis SAVANNAH Seo.    Note: This report was completed using computerize voiced recognition software.  Every effort has been made to ensure accuracy; however, inadvertent computerized transcription errors may be present.

## 2024-11-26 DIAGNOSIS — R52 PAIN: ICD-10-CM

## 2024-11-26 DIAGNOSIS — C78.7 PANCREATIC CARCINOMA METASTATIC TO LIVER (HCC): ICD-10-CM

## 2024-11-26 DIAGNOSIS — Z51.5 HOSPICE CARE PATIENT: Primary | ICD-10-CM

## 2024-11-26 DIAGNOSIS — F41.9 ANXIETY: ICD-10-CM

## 2024-11-26 DIAGNOSIS — R09.89 AIR HUNGER: ICD-10-CM

## 2024-11-26 DIAGNOSIS — C25.9 PANCREATIC CARCINOMA METASTATIC TO LIVER (HCC): ICD-10-CM

## 2024-11-26 RX ORDER — BISACODYL 10 MG
10 SUPPOSITORY, RECTAL RECTAL DAILY PRN
Qty: 10 SUPPOSITORY | Refills: 5 | Status: SHIPPED | OUTPATIENT
Start: 2024-11-26

## 2024-11-26 RX ORDER — HALOPERIDOL 1 MG/1
1 TABLET ORAL EVERY 6 HOURS PRN
Qty: 5 TABLET | Refills: 0 | Status: SHIPPED | OUTPATIENT
Start: 2024-11-26

## 2024-11-26 RX ORDER — BISACODYL 5 MG/1
10 TABLET, DELAYED RELEASE ORAL DAILY PRN
Qty: 30 TABLET | Refills: 5 | Status: SHIPPED | OUTPATIENT
Start: 2024-11-26

## 2024-11-26 RX ORDER — LORAZEPAM 0.5 MG/1
TABLET ORAL
Qty: 60 TABLET | Refills: 5 | Status: SHIPPED | OUTPATIENT
Start: 2024-11-26 | End: 2025-01-25

## 2024-11-26 RX ORDER — GLYCOPYRROLATE 2 MG/1
2 TABLET ORAL 3 TIMES DAILY PRN
Qty: 5 TABLET | Refills: 0 | Status: SHIPPED | OUTPATIENT
Start: 2024-11-26

## 2024-11-26 RX ORDER — LACTULOSE 10 G/15ML
30 SOLUTION ORAL 2 TIMES DAILY PRN
Qty: 900 ML | Refills: 5 | Status: SHIPPED | OUTPATIENT
Start: 2024-11-26

## 2024-11-26 RX ORDER — MORPHINE SULFATE 100 MG/5ML
5 SOLUTION ORAL EVERY 6 HOURS PRN
Qty: 30 ML | Refills: 0 | Status: SHIPPED | OUTPATIENT
Start: 2024-11-26 | End: 2024-12-26

## 2024-11-26 RX ORDER — ACETAMINOPHEN 650 MG/1
650 SUPPOSITORY RECTAL EVERY 4 HOURS PRN
Qty: 3 SUPPOSITORY | Refills: 0 | Status: SHIPPED | OUTPATIENT
Start: 2024-11-26

## 2024-11-26 RX ORDER — METOCLOPRAMIDE 10 MG/1
10 TABLET ORAL
Qty: 120 TABLET | Refills: 5 | Status: SHIPPED | OUTPATIENT
Start: 2024-11-26

## 2024-11-26 RX ORDER — OLANZAPINE 5 MG/1
5 TABLET, ORALLY DISINTEGRATING ORAL EVERY 12 HOURS PRN
Qty: 5 TABLET | Refills: 0 | Status: SHIPPED | OUTPATIENT
Start: 2024-11-26